# Patient Record
Sex: MALE | Race: WHITE | NOT HISPANIC OR LATINO | Employment: OTHER | URBAN - NONMETROPOLITAN AREA
[De-identification: names, ages, dates, MRNs, and addresses within clinical notes are randomized per-mention and may not be internally consistent; named-entity substitution may affect disease eponyms.]

---

## 2017-01-04 ENCOUNTER — TELEPHONE (OUTPATIENT)
Dept: ORTHOPEDICS | Facility: OTHER | Age: 67
End: 2017-01-04

## 2017-01-04 ENCOUNTER — OFFICE VISIT (OUTPATIENT)
Dept: ORTHOPEDICS | Facility: OTHER | Age: 67
End: 2017-01-04
Attending: PHYSICIAN ASSISTANT
Payer: COMMERCIAL

## 2017-01-04 VITALS
HEART RATE: 59 BPM | OXYGEN SATURATION: 98 % | HEIGHT: 68 IN | BODY MASS INDEX: 28.79 KG/M2 | TEMPERATURE: 96.7 F | SYSTOLIC BLOOD PRESSURE: 124 MMHG | DIASTOLIC BLOOD PRESSURE: 74 MMHG | WEIGHT: 190 LBS

## 2017-01-04 DIAGNOSIS — E78.00 PURE HYPERCHOLESTEROLEMIA: Primary | ICD-10-CM

## 2017-01-04 DIAGNOSIS — D64.9 ANEMIA, UNSPECIFIED TYPE: ICD-10-CM

## 2017-01-04 DIAGNOSIS — M16.11 PRIMARY OSTEOARTHRITIS OF RIGHT HIP: Primary | ICD-10-CM

## 2017-01-04 LAB
ALBUMIN SERPL-MCNC: 4 G/DL (ref 3.4–5)
ALBUMIN UR-MCNC: NEGATIVE MG/DL
ALP SERPL-CCNC: 98 U/L (ref 40–150)
ALT SERPL W P-5'-P-CCNC: 42 U/L (ref 0–70)
ANION GAP SERPL CALCULATED.3IONS-SCNC: 8 MMOL/L (ref 3–14)
APPEARANCE UR: CLEAR
AST SERPL W P-5'-P-CCNC: 54 U/L (ref 0–45)
BASOPHILS # BLD AUTO: 0 10E9/L (ref 0–0.2)
BASOPHILS NFR BLD AUTO: 0.2 %
BILIRUB SERPL-MCNC: 0.8 MG/DL (ref 0.2–1.3)
BILIRUB UR QL STRIP: NEGATIVE
BUN SERPL-MCNC: 19 MG/DL (ref 7–30)
CALCIUM SERPL-MCNC: 9.3 MG/DL (ref 8.5–10.1)
CHLORIDE SERPL-SCNC: 105 MMOL/L (ref 94–109)
CO2 SERPL-SCNC: 28 MMOL/L (ref 20–32)
COLOR UR AUTO: NORMAL
CREAT SERPL-MCNC: 1 MG/DL (ref 0.66–1.25)
DIFFERENTIAL METHOD BLD: NORMAL
EOSINOPHIL # BLD AUTO: 0.2 10E9/L (ref 0–0.7)
EOSINOPHIL NFR BLD AUTO: 5 %
ERYTHROCYTE [DISTWIDTH] IN BLOOD BY AUTOMATED COUNT: 12.9 % (ref 10–15)
GFR SERPL CREATININE-BSD FRML MDRD: 75 ML/MIN/1.7M2
GLUCOSE SERPL-MCNC: 66 MG/DL (ref 70–99)
GLUCOSE UR STRIP-MCNC: NEGATIVE MG/DL
HCT VFR BLD AUTO: 40.5 % (ref 40–53)
HGB BLD-MCNC: 13.7 G/DL (ref 13.3–17.7)
HGB UR QL STRIP: NEGATIVE
IMM GRANULOCYTES # BLD: 0 10E9/L (ref 0–0.4)
IMM GRANULOCYTES NFR BLD: 0.4 %
IRON SATN MFR SERPL: 17 % (ref 15–46)
IRON SERPL-MCNC: 63 UG/DL (ref 35–180)
KETONES UR STRIP-MCNC: NEGATIVE MG/DL
LEUKOCYTE ESTERASE UR QL STRIP: NEGATIVE
LYMPHOCYTES # BLD AUTO: 1.2 10E9/L (ref 0.8–5.3)
LYMPHOCYTES NFR BLD AUTO: 25.6 %
MCH RBC QN AUTO: 31.1 PG (ref 26.5–33)
MCHC RBC AUTO-ENTMCNC: 33.8 G/DL (ref 31.5–36.5)
MCV RBC AUTO: 92 FL (ref 78–100)
MONOCYTES # BLD AUTO: 0.6 10E9/L (ref 0–1.3)
MONOCYTES NFR BLD AUTO: 13.1 %
NEUTROPHILS # BLD AUTO: 2.6 10E9/L (ref 1.6–8.3)
NEUTROPHILS NFR BLD AUTO: 55.7 %
NITRATE UR QL: NEGATIVE
NRBC # BLD AUTO: 0 10*3/UL
NRBC BLD AUTO-RTO: 0 /100
PH UR STRIP: 5 PH (ref 4.7–8)
PLATELET # BLD AUTO: 210 10E9/L (ref 150–450)
POTASSIUM SERPL-SCNC: 4.2 MMOL/L (ref 3.4–5.3)
PROT SERPL-MCNC: 7.9 G/DL (ref 6.8–8.8)
RBC # BLD AUTO: 4.4 10E12/L (ref 4.4–5.9)
SODIUM SERPL-SCNC: 141 MMOL/L (ref 133–144)
SP GR UR STRIP: 1.01 (ref 1–1.03)
TIBC SERPL-MCNC: 376 UG/DL (ref 240–430)
URN SPEC COLLECT METH UR: NORMAL
UROBILINOGEN UR STRIP-MCNC: NORMAL MG/DL (ref 0–2)
WBC # BLD AUTO: 4.6 10E9/L (ref 4–11)

## 2017-01-04 PROCEDURE — 85025 COMPLETE CBC W/AUTO DIFF WBC: CPT | Performed by: ORTHOPAEDIC SURGERY

## 2017-01-04 PROCEDURE — 87086 URINE CULTURE/COLONY COUNT: CPT | Performed by: ORTHOPAEDIC SURGERY

## 2017-01-04 PROCEDURE — 36415 COLL VENOUS BLD VENIPUNCTURE: CPT | Performed by: ORTHOPAEDIC SURGERY

## 2017-01-04 PROCEDURE — 81003 URINALYSIS AUTO W/O SCOPE: CPT | Performed by: ORTHOPAEDIC SURGERY

## 2017-01-04 PROCEDURE — 99214 OFFICE O/P EST MOD 30 MIN: CPT

## 2017-01-04 PROCEDURE — 83540 ASSAY OF IRON: CPT | Performed by: ORTHOPAEDIC SURGERY

## 2017-01-04 PROCEDURE — 80053 COMPREHEN METABOLIC PANEL: CPT | Performed by: ORTHOPAEDIC SURGERY

## 2017-01-04 PROCEDURE — 99213 OFFICE O/P EST LOW 20 MIN: CPT | Performed by: ORTHOPAEDIC SURGERY

## 2017-01-04 PROCEDURE — 83550 IRON BINDING TEST: CPT | Performed by: ORTHOPAEDIC SURGERY

## 2017-01-04 ASSESSMENT — PAIN SCALES - GENERAL: PAINLEVEL: MODERATE PAIN (5)

## 2017-01-04 NOTE — TELEPHONE ENCOUNTER
Patient notified that Dr. Lowery appointment made today to f/u on Anemia can be cancelled patient agreed to plan. Please cancel Beverley appointment.  Melissa Mace LPN

## 2017-01-04 NOTE — TELEPHONE ENCOUNTER
zocor     Last Written Prescription Date: 9/23/16  Last Fill Quantity: 90, # refills: 0  Last Office Visit with G, P or [facility] prescribing provider: 1/4/17   Next 5 appointments (look out 90 days)     Jan 25, 2017  8:40 AM   (Arrive by 8:20 AM)   Return Visit with Mahesh Capps MD    ORTHOPEDICS ([facility])    [address] [facility] [address]   [phone]                   CHOL      191   9/29/2016  HDL       60   9/29/2016  LDL      106   9/29/2016  TRIG      126   9/29/2016  CHOLHDLRATIO      2.6   5/22/2015

## 2017-01-04 NOTE — PROGRESS NOTES
Chief complaint: Severe DJD right hip    Subjective: This 66-year-old retired male was seen on in this office in early 2015 for right hip pain.  X-rays revealed DJD.  The symptoms were successfully controlled with oral sulindac. For whatever reason he came off the sulindac.  According to epic he was not on sulindac in February 2016. Several months ago he developed right groin pain..  Evaluations by a urologist and by a general surgeon failed to find a urologic or general surgical explanation for his right groin pain. He returned to this office and saw Chandra BYNUM on 12/7/16. X-rays showed severe DJD of the right hip.  He was put back on an oral prescription NSAID: this time naproxen.  He is being seen here today in follow-up.    He reports that the Naprosyn has noticeably lessened his right hip pain, but not enough to satisfy him.  His pain still interferes with activities of daily living such as the distance he can walk in the number stairs he can climb.  It also bothers him when he curls.  At his last visit other treatment options such as a steroid injection or DIMAS were discussed.  Today he reports he desires DIMAS.  He has researched the operation and has decided he would like an anterior approach.    Nothing else has changed with respect to his musculoskeletal or neurologic review of systems since seen last. He is not diabetic. He is status post right shoulder arthroplasty.    Examination: Healthy-appearing male with appropriate mood and affect.  Stance and gait were normal.  His leg lengths were normal.  Passive range of motion of the right hip revealed only 90  of flexion with no internal rotation in flexion, and only minimal external rotation in flexion.  He could do a straight leg raise against gravity.  Resisted hip flexion and abduction strength were normal.  The Stinchfield test was positive.  There was no right hip flexion contracture. The neurovascular status of the right leg is intact.    X-rays: I  reviewed his right hip films of 12/7/16 which show severe DJD in the form of complete loss of joint space, large osteophytes, and flattening of the femoral head.    Labs: On 11/16/15 a CBC showed a low H&H of 11.4 and 34.7.  The CBC before that, on 5/22/15, was also low at 12.0 and 36.4.  The CBC was repeated today.  The H&H is 13.7 and 40.5, both within normal limits.  A urinalysis was negative. A CMP showed normal electrolytes, BUN 19, creatinine 1.0,albumin 4.0, total protein 7.9, AST 52, and AST 54. His iron level was 63, iron-binding capacity 376, and iron saturation 17.    Impression:    #1. Severe DJD right hip, failure of oral NSAID  #2.  Prior anemia, now resolved      Plan    #1.  He will attend the next joint class on January 11, 2017.  #2.  I gave him a brochure on total hip arthroplasty and discussed the procedure with him.  I explained that there are 2 anterior approaches: The direct anterior and the anterolateral approach.  I explained the pros and cons of each and how anterior approaches differ from posterior approaches.  We do not do the direct anterior approach here but we do do the anterolateral approach.  He is not certain whether he wants to go anterolateral.  He wants to think about it and reserves the option of going to a different surgeon in [place] for a direct anterior approach.  We set up an appointment to come back and see me after he sees his PCP and attends the joint class, to discuss surgery further, but he may cancel the appointment if he decides to go to [place].

## 2017-01-04 NOTE — MR AVS SNAPSHOT
After Visit Summary   1/4/2017    Oswald Goel    MRN: [id number]           Patient Information     Date Of Birth          1950        Visit Information        Provider Department      1/4/2017 8:20 AM Mahesh Capps MD  ORTHOPEDICS        Today's Diagnoses     Primary osteoarthritis of right hip    -  1     Anemia            Follow-ups after your visit        Your next 10 appointments already scheduled     Jasvir 10, 2017  8:45 AM   (Arrive by 8:30 AM)   Office Visit with ALDEN Lowery MD   [facility])    [address] [facility] [id number] [phone]           Bring a current list of meds and any records pertaining to this visit.  For Physicals, please bring immunization records and any forms needing to be filled out.    Please arrive 15 minutes early to complete paperwork and register.            Jan 25, 2017  8:40 AM   (Arrive by 8:20 AM)   Return Visit with Mahesh Capps MD    ORTHOPEDICS ([facility])    [address] [facility] [id number] [phone]              Who to contact     If you have questions or need follow up information about today's clinic visit or your schedule please contact  ORTHOPEDICS directly at [phone].  Normal or non-critical lab and imaging results will be communicated to you by MyChart, letter or phone within 4 business days after the clinic has received the results. If you do not hear from us within 7 days, please contact the clinic through [employer] or phone. If you have a critical or abnormal lab result, we will notify you by phone as soon as possible.  Submit refill requests through [employer] or call your pharmacy and they will forward the refill request to us. Please allow 3 business days for your refill to be completed.          Additional Information About Your Visit        [employer] Information     [employer] lets you send messages to your doctor, view your test results, renew your  "prescriptions, schedule appointments and more. To sign up, go to [email or web address] . Click on \"Log in\" on the left side of the screen, which will take you to the Welcome page. Then click on \"Sign up Now\" on the right side of the page.     You will be asked to enter the access code listed below, as well as some personal information. Please follow the directions to create your username and password.     Your access code is: [email or web address]  Expires: 2017 12:42 PM     Your access code will  in 90 days. If you need help or a new code, please call your [facility] or [phone].        Care EveryWhere ID     This is your Care EveryWhere ID. This could be used by other organizations to access your [place] medical records  [id number]        Your Vitals Were     Pulse Temperature Height BMI (Body Mass Index) Pulse Oximetry       59 96.7  F (35.9  C) (Tympanic) 5' 8\" (1.727 m) 28.90 kg/m2 98%        Blood Pressure from Last 3 Encounters:   17 124/74   16 138/80   16 164/88    Weight from Last 3 Encounters:   17 190 lb (86.183 kg)   16 190 lb (86.183 kg)   16 198 lb 8 oz (90.039 kg)              We Performed the Following     *UA reflex to Microscopic and Culture     CBC with platelets and differential     Comprehensive metabolic panel     Iron and iron binding capacity     Urine Culture Aerobic Bacterial        Primary Care Provider Office Phone # Fax #    R Raul Lowery [phone]       [facility] [place] [address] [place] [address]        Thank you!     Thank you for choosing  ORTHOPEDICS  for your care. Our goal is always to provide you with excellent care. Hearing back from our patients is one way we can continue to improve our services. Please take a few minutes to complete the written survey that you may receive in the mail after your visit with us. Thank you!             Your Updated Medication List - Protect others around " you: Learn how to safely use, store and throw away your medicines at www.disposemymeds.org.          This list is accurate as of: 1/4/17  8:37 AM.  Always use your most recent med list.                   Brand Name Dispense Instructions for use    ASPIRIN      Aspirin, 81 mg aspirin daily       DAILY MULTIVITAMIN PO      Take 1 tablet by Oral route every day with food       fish oil-omega-3 fatty acids 1000 MG capsule      Take 1 g by mouth daily       GARLIC PO      Take one tablet daily       lisinopril 5 MG tablet    PRINIVIL/ZESTRIL    90 tablet    TAKE (1) TABLET DAILY.       naproxen 500 MG tablet    NAPROSYN    60 tablet    Take 1 tablet (500 mg) by mouth 2 times daily (with meals)       simvastatin 20 MG tablet    ZOCOR    90 tablet    TAKE 1 TABLET DAILY.       TYLENOL PO      Take 325 mg by mouth every 4 hours as needed for mild pain or fever       VITAMIN C PO      Daily

## 2017-01-04 NOTE — NURSING NOTE
"Chief Complaint   Patient presents with     Musculoskeletal Problem     Seen Kt Lopez to [facility]. Here for right hip problem.       Initial /74 mmHg  Pulse 59  Temp(Src) 96.7  F (35.9  C) (Tympanic)  Ht 5' 8\" (1.727 m)  Wt 190 lb (86.183 kg)  BMI 28.90 kg/m2  SpO2 98% Estimated body mass index is 28.9 kg/(m^2) as calculated from the following:    Height as of this encounter: 5' 8\" (1.727 m).    Weight as of this encounter: 190 lb (86.183 kg).  BP completed using cuff size: regular  Jacqui Walton LPN      "

## 2017-01-05 ENCOUNTER — TRANSFERRED RECORDS (OUTPATIENT)
Dept: HEALTH INFORMATION MANAGEMENT | Facility: HOSPITAL | Age: 67
End: 2017-01-05

## 2017-01-05 RX ORDER — SIMVASTATIN 20 MG
TABLET ORAL
Qty: 90 TABLET | Refills: 2 | Status: SHIPPED | OUTPATIENT
Start: 2017-01-05 | End: 2017-07-17

## 2017-01-06 LAB
BACTERIA SPEC CULT: NO GROWTH
MICRO REPORT STATUS: NORMAL
SPECIMEN SOURCE: NORMAL

## 2017-02-21 ENCOUNTER — TELEPHONE (OUTPATIENT)
Dept: FAMILY MEDICINE | Facility: OTHER | Age: 67
End: 2017-02-21

## 2017-02-21 ENCOUNTER — OFFICE VISIT (OUTPATIENT)
Dept: FAMILY MEDICINE | Facility: OTHER | Age: 67
End: 2017-02-21
Attending: FAMILY MEDICINE
Payer: COMMERCIAL

## 2017-02-21 VITALS
SYSTOLIC BLOOD PRESSURE: 136 MMHG | TEMPERATURE: 96.2 F | WEIGHT: 198 LBS | OXYGEN SATURATION: 94 % | DIASTOLIC BLOOD PRESSURE: 74 MMHG | BODY MASS INDEX: 30.01 KG/M2 | HEIGHT: 68 IN | HEART RATE: 67 BPM

## 2017-02-21 DIAGNOSIS — Z01.818 PREOP GENERAL PHYSICAL EXAM: Primary | ICD-10-CM

## 2017-02-21 LAB
ALBUMIN UR-MCNC: NEGATIVE MG/DL
ANION GAP SERPL CALCULATED.3IONS-SCNC: 8 MMOL/L (ref 3–14)
APPEARANCE UR: CLEAR
BASOPHILS # BLD AUTO: 0 10E9/L (ref 0–0.2)
BASOPHILS NFR BLD AUTO: 0.2 %
BILIRUB UR QL STRIP: NEGATIVE
BUN SERPL-MCNC: 23 MG/DL (ref 7–30)
CALCIUM SERPL-MCNC: 8.8 MG/DL (ref 8.5–10.1)
CHLORIDE SERPL-SCNC: 105 MMOL/L (ref 94–109)
CO2 SERPL-SCNC: 28 MMOL/L (ref 20–32)
COLOR UR AUTO: NORMAL
CREAT SERPL-MCNC: 1.15 MG/DL (ref 0.66–1.25)
DIFFERENTIAL METHOD BLD: ABNORMAL
EOSINOPHIL # BLD AUTO: 0.2 10E9/L (ref 0–0.7)
EOSINOPHIL NFR BLD AUTO: 2.5 %
ERYTHROCYTE [DISTWIDTH] IN BLOOD BY AUTOMATED COUNT: 13.4 % (ref 10–15)
GFR SERPL CREATININE-BSD FRML MDRD: 63 ML/MIN/1.7M2
GLUCOSE SERPL-MCNC: 90 MG/DL (ref 70–99)
GLUCOSE UR STRIP-MCNC: NEGATIVE MG/DL
HCT VFR BLD AUTO: 35.8 % (ref 40–53)
HGB BLD-MCNC: 12.4 G/DL (ref 13.3–17.7)
HGB UR QL STRIP: NEGATIVE
IMM GRANULOCYTES # BLD: 0 10E9/L (ref 0–0.4)
IMM GRANULOCYTES NFR BLD: 0.3 %
INR PPP: 1 (ref 0.8–1.2)
KETONES UR STRIP-MCNC: NEGATIVE MG/DL
LEUKOCYTE ESTERASE UR QL STRIP: NEGATIVE
LYMPHOCYTES # BLD AUTO: 1.2 10E9/L (ref 0.8–5.3)
LYMPHOCYTES NFR BLD AUTO: 20 %
MCH RBC QN AUTO: 31.5 PG (ref 26.5–33)
MCHC RBC AUTO-ENTMCNC: 34.6 G/DL (ref 31.5–36.5)
MCV RBC AUTO: 91 FL (ref 78–100)
MONOCYTES # BLD AUTO: 0.5 10E9/L (ref 0–1.3)
MONOCYTES NFR BLD AUTO: 9.1 %
NEUTROPHILS # BLD AUTO: 4.1 10E9/L (ref 1.6–8.3)
NEUTROPHILS NFR BLD AUTO: 67.9 %
NITRATE UR QL: NEGATIVE
NRBC # BLD AUTO: 0 10*3/UL
NRBC BLD AUTO-RTO: 0 /100
PH UR STRIP: 5 PH (ref 4.7–8)
PLATELET # BLD AUTO: 209 10E9/L (ref 150–450)
POTASSIUM SERPL-SCNC: 3.9 MMOL/L (ref 3.4–5.3)
RBC # BLD AUTO: 3.94 10E12/L (ref 4.4–5.9)
SODIUM SERPL-SCNC: 141 MMOL/L (ref 133–144)
SP GR UR STRIP: 1.01 (ref 1–1.03)
URN SPEC COLLECT METH UR: NORMAL
UROBILINOGEN UR STRIP-MCNC: NORMAL MG/DL (ref 0–2)
WBC # BLD AUTO: 6 10E9/L (ref 4–11)

## 2017-02-21 PROCEDURE — 85610 PROTHROMBIN TIME: CPT | Performed by: FAMILY MEDICINE

## 2017-02-21 PROCEDURE — 81003 URINALYSIS AUTO W/O SCOPE: CPT | Performed by: FAMILY MEDICINE

## 2017-02-21 PROCEDURE — 85025 COMPLETE CBC W/AUTO DIFF WBC: CPT | Performed by: FAMILY MEDICINE

## 2017-02-21 PROCEDURE — 99213 OFFICE O/P EST LOW 20 MIN: CPT

## 2017-02-21 PROCEDURE — 93005 ELECTROCARDIOGRAM TRACING: CPT

## 2017-02-21 PROCEDURE — 93010 ELECTROCARDIOGRAM REPORT: CPT | Performed by: INTERNAL MEDICINE

## 2017-02-21 PROCEDURE — 36415 COLL VENOUS BLD VENIPUNCTURE: CPT | Performed by: FAMILY MEDICINE

## 2017-02-21 PROCEDURE — 80048 BASIC METABOLIC PNL TOTAL CA: CPT | Performed by: FAMILY MEDICINE

## 2017-02-21 PROCEDURE — 99214 OFFICE O/P EST MOD 30 MIN: CPT | Performed by: FAMILY MEDICINE

## 2017-02-21 ASSESSMENT — ANXIETY QUESTIONNAIRES
1. FEELING NERVOUS, ANXIOUS, OR ON EDGE: SEVERAL DAYS
GAD7 TOTAL SCORE: 5
3. WORRYING TOO MUCH ABOUT DIFFERENT THINGS: SEVERAL DAYS
5. BEING SO RESTLESS THAT IT IS HARD TO SIT STILL: NOT AT ALL
7. FEELING AFRAID AS IF SOMETHING AWFUL MIGHT HAPPEN: SEVERAL DAYS
6. BECOMING EASILY ANNOYED OR IRRITABLE: SEVERAL DAYS
2. NOT BEING ABLE TO STOP OR CONTROL WORRYING: NOT AT ALL
IF YOU CHECKED OFF ANY PROBLEMS ON THIS QUESTIONNAIRE, HOW DIFFICULT HAVE THESE PROBLEMS MADE IT FOR YOU TO DO YOUR WORK, TAKE CARE OF THINGS AT HOME, OR GET ALONG WITH OTHER PEOPLE: SOMEWHAT DIFFICULT

## 2017-02-21 ASSESSMENT — PATIENT HEALTH QUESTIONNAIRE - PHQ9: 5. POOR APPETITE OR OVEREATING: SEVERAL DAYS

## 2017-02-21 ASSESSMENT — PAIN SCALES - GENERAL: PAINLEVEL: NO PAIN (0)

## 2017-02-21 NOTE — MR AVS SNAPSHOT
After Visit Summary   2/21/2017    Oswald Goel    MRN: [id number]           Patient Information     Date Of Birth          1950        Visit Information        Provider Department      2/21/2017 2:15 PM ALDEN Lowery MD [facility]        Today's Diagnoses     Preop general physical exam    -  1      Care Instructions      Before Your Surgery      Call your surgeon if there is any change in your health. This includes signs of a cold or flu (such as a sore throat, runny nose, cough, rash or fever).    Do not smoke, drink alcohol or take over the counter medicine (unless your surgeon or primary care doctor tells you to) for the 24 hours before and after surgery.    If you take prescribed drugs: Follow your doctor s orders about which medicines to take and which to stop until after surgery.    Eating and drinking prior to surgery: follow the instructions from your surgeon    Take a shower or bath the night before surgery. Use the soap your surgeon gave you to gently clean your skin. If you do not have soap from your surgeon, use your regular soap. Do not shave or scrub the surgery site.  Wear clean pajamas and have clean sheets on your bed.    Hold the ibuprofen and aspirin for one week before. Can use Acetaminophen    Nothing to eat or drink after midnight.Take the Lisinopril with a sip of water in the AM        Follow-ups after your visit        Who to contact     If you have questions or need follow up information about today's clinic visit or your schedule please contact [facility] directly at [phone].  Normal or non-critical lab and imaging results will be communicated to you by MyChart, letter or phone within 4 business days after the clinic has received the results. If you do not hear from us within 7 days, please contact the clinic through MyChart or phone. If you have a critical or abnormal lab result, we will notify you by phone as soon as  "possible.  Submit refill requests through [employer] or call your pharmacy and they will forward the refill request to us. Please allow 3 business days for your refill to be completed.          Additional Information About Your Visit        [employer] Information     3dim lets you send messages to your doctor, view your test results, renew your prescriptions, schedule appointments and more. To sign up, go to [email or web address] . Click on \"Log in\" on the left side of the screen, which will take you to the Welcome page. Then click on \"Sign up Now\" on the right side of the page.     You will be asked to enter the access code listed below, as well as some personal information. Please follow the directions to create your username and password.     Your access code is: [email or web address]  Expires: 2017  2:44 PM     Your access code will  in 90 days. If you need help or a new code, please call your [place] clinic or [phone].        Care EveryWhere ID     This is your Care EveryWhere ID. This could be used by other organizations to access your [place] medical records  [id number]        Your Vitals Were     Pulse Temperature Height Pulse Oximetry BMI (Body Mass Index)       67 96.2  F (35.7  C) (Tympanic) 5' 8\" (1.727 m) 94% 30.11 kg/m2        Blood Pressure from Last 3 Encounters:   17 136/74   17 124/74   16 138/80    Weight from Last 3 Encounters:   17 198 lb (89.8 kg)   17 190 lb (86.2 kg)   16 190 lb (86.2 kg)              We Performed the Following     Basic metabolic panel     CBC with platelets differential     EKG 12-lead complete w/read - Clinics     INR     UA reflex to Microscopic and Culture          Today's Medication Changes          These changes are accurate as of: 17  2:44 PM.  If you have any questions, ask your nurse or doctor.               Stop taking these medicines if you haven't already. Please contact your care team if you have questions.     " naproxen 500 MG tablet   Commonly known as:  NAPROSYN   Stopped by:  ALDEN Lowery MD           TYLENOL PO   Stopped by:  ALDEN Lowery MD                    Primary Care Provider Office Phone # Fax #    ALDEN Lowery [phone]       [facility] [place] [address] [place] [address]        Thank you!     Thank you for choosing [facility]  for your care. Our goal is always to provide you with excellent care. Hearing back from our patients is one way we can continue to improve our services. Please take a few minutes to complete the written survey that you may receive in the mail after your visit with us. Thank you!             Your Updated Medication List - Protect others around you: Learn how to safely use, store and throw away your medicines at www.disposemymeds.org.          This list is accurate as of: 2/21/17  2:44 PM.  Always use your most recent med list.                   Brand Name Dispense Instructions for use    ASPIRIN      Aspirin, 81 mg aspirin daily       DAILY MULTIVITAMIN PO      Take 1 tablet by Oral route every day with food       fish oil-omega-3 fatty acids 1000 MG capsule      Take 1 g by mouth daily       GARLIC PO      Take one tablet daily       IBUPROFEN PO      Take 200 mg by mouth every 8 hours as needed for moderate pain       lisinopril 5 MG tablet    PRINIVIL/ZESTRIL    90 tablet    TAKE (1) TABLET DAILY.       simvastatin 20 MG tablet    ZOCOR    90 tablet    TAKE 1 TABLET DAILY.       VITAMIN C PO      Daily

## 2017-02-21 NOTE — NURSING NOTE
"Chief Complaint   Patient presents with     Pre-Op Exam     *_* Health Care Directive *_*     declined        Initial /74 (BP Location: Left arm, Patient Position: Chair, Cuff Size: Adult Regular)  Pulse 67  Temp 96.2  F (35.7  C) (Tympanic)  Ht 5' 8\" (1.727 m)  Wt 198 lb (89.8 kg)  SpO2 94%  BMI 30.11 kg/m2 Estimated body mass index is 30.11 kg/(m^2) as calculated from the following:    Height as of this encounter: 5' 8\" (1.727 m).    Weight as of this encounter: 198 lb (89.8 kg).  Medication Reconciliation: complete     LEILA DE OLIVEIRA      "

## 2017-02-21 NOTE — PROGRESS NOTES
[facility]  [address]  [place] [address]  [phone]  Dept: [phone]    PRE-OP EVALUATION:  Today's date: 2017    Oswald Goel (: 1950) presents for pre-operative evaluation assessment as requested by Dr. Hurtado.  He requires evaluation and anesthesia risk assessment prior to undergoing surgery/procedure for treatment of right hip replacement  .  Proposed procedure: right hip replacement surgery    Date of Surgery/ Procedure: 03/10/2017  Time of Surgery/ Procedure: Unknown  Hospital/Surgical Facility: [facility]  Primary Physician: ALDEN Lowery  Type of Anesthesia Anticipated: to be determined    Patient has a Health Care Directive or Living Will:  NO    1. NO - Do you have a history of heart attack, stroke, stent, bypass or surgery on an artery in the head, neck, heart or legs?  2. NO - Do you ever have any pain or discomfort in your chest?  3. NO - Do you have a history of  Heart Failure?  4. NO - Are you troubled by shortness of breath when: walking on the level, up a slight hill or at night?  5. NO - Do you currently have a cold, bronchitis or other respiratory infection?  6. NO - Do you have a cough, shortness of breath or wheezing?  7. NO - Do you sometimes get pains in the calves of your legs when you walk?  8. NO - Do you or anyone in your family have previous history of blood clots?  9. NO - Do you or does anyone in your family have a serious bleeding problem such as prolonged bleeding following surgeries or cuts?  10. NO - Have you ever had problems with anemia or been told to take iron pills?  11. NO - Have you had any abnormal blood loss such as black, tarry or bloody stools, or abnormal vaginal bleeding?  12. NO - Have you ever had a blood transfusion?  13. NO - Have you or any of your relatives ever had problems with anesthesia?  14. NO - Do you have sleep apnea, excessive snoring or daytime drowsiness?  15. NO - Do you have any prosthetic heart  valves?  16. YES Do you have prosthetic joints? Right shoulder   17. NO - Is there any chance that you may be pregnant?      HPI:                                                      Brief HPI related to upcoming procedure: right hip replacement      See problem list for active medical problems.  Problems all longstanding and stable, except as noted/documented.  See ROS for pertinent symptoms related to these conditions.                                                                                                  .    MEDICAL HISTORY:                                                      Patient Active Problem List    Diagnosis Date Noted     ACP (advance care planning) 08/10/2016     Priority: Medium     Advance Care Planning 8/10/2016: ACP Review of Chart / Resources Provided:  Reviewed chart for advance care plan.  Oswald CECI Goel has no plan or code status on file. Discussed available resources and provided with information. Confirmed code status reflects current choices pending further ACP discussions.  Confirmed/documented legally designated decision makers.  Added by YOANDY NASH             Primary localized osteoarthrosis, pelvic region and thigh 01/20/2015     Priority: Medium     Advanced care planning/counseling discussion 03/30/2012     Priority: Medium     Hypertension, Benign 02/22/2011     Priority: Medium     RBBB 02/22/2011     Priority: Medium     Nonallopathic lesion of cervical region 08/27/2008     Priority: Medium     Problem list name updated by automated process. Provider to review       Hypertrophy of prostate without urinary obstruction 10/10/2007     Priority: Medium     Problem list name updated by automated process. Provider to review       Pure hypercholesterolemia 12/29/1999     Priority: Medium      Past Medical History   Diagnosis Date     Depressive disorder, not elsewhere classified 02/22/2011     Hypertension, Benign 02/22/2011     Hypertrophy (benign) of prostate without  urinary o 10/10/2007     Nonallopathic lesion of cervical region, not elsewhere classified 08/27/2008     Pure hypercholesterolemia 12/29/1999     RBBB 02/22/2011     Past Surgical History   Procedure Laterality Date     Colonoscopy  03-     repeat 10 yrs     Hernia repair       Colonoscopy  2005     Release carpal tunnel       RT     Tonsillectomy       Left arthroscopy       Transposition ulnar nerve (elbow) Left 11/19/2015     Procedure: TRANSPOSITION ULNAR NERVE (ELBOW);  Surgeon: Mahesh Capps MD;  Location: [facility]     Current Outpatient Prescriptions   Medication Sig Dispense Refill     IBUPROFEN PO Take 200 mg by mouth every 8 hours as needed for moderate pain       simvastatin (ZOCOR) 20 MG tablet TAKE 1 TABLET DAILY. 90 tablet 2     lisinopril (PRINIVIL/ZESTRIL) 5 MG tablet TAKE (1) TABLET DAILY. 90 tablet 0     fish oil-omega-3 fatty acids (FISH OIL) 1000 MG capsule Take 1 g by mouth daily       ASPIRIN Aspirin, 81 mg aspirin daily       GARLIC PO Take one tablet daily       Multiple Vitamin (DAILY MULTIVITAMIN PO) Take 1 tablet by Oral route every day with food       Ascorbic Acid (VITAMIN C PO) Daily       OTC products: Aspirin and NSAIDS    No Known Allergies   Latex Allergy: NO    Social History   Substance Use Topics     Smoking status: Former Smoker     Types: Cigarettes     Smokeless tobacco: Former User      Comment: no passive exposure, tried to quit-yes     Alcohol use Yes      Comment: 2 glasses, daily, yesterday     History   Drug Use No       REVIEW OF SYSTEMS:                                                    C: NEGATIVE for fever, chills, change in weight  I: NEGATIVE for worrisome rashes, moles or lesions  E: NEGATIVE for vision changes or irritation  E/M: NEGATIVE for ear, mouth and throat problems  R: NEGATIVE for significant cough or SOB  B: NEGATIVE for masses, tenderness or discharge  CV: NEGATIVE for chest pain, palpitations or peripheral edema  GI: NEGATIVE for nausea,  "abdominal pain, heartburn, or change in bowel habits  : NEGATIVE for frequency, dysuria, or hematuria  M: NEGATIVE for significant arthralgias or myalgia except the right hip pain.  N: NEGATIVE for weakness, dizziness or paresthesias  E: NEGATIVE for temperature intolerance, skin/hair changes  H: NEGATIVE for bleeding problems  P: NEGATIVE for changes in mood or affect    EXAM:                                                    /74 (BP Location: Left arm, Patient Position: Chair, Cuff Size: Adult Regular)  Pulse 67  Temp 96.2  F (35.7  C) (Tympanic)  Ht 5' 8\" (1.727 m)  Wt 198 lb (89.8 kg)  SpO2 94%  BMI 30.11 kg/m2    GENERAL APPEARANCE: healthy, alert and no distress     EYES: EOMI, - PERRL     HENT: ear canals and TM's normal and nose and mouth without ulcers or lesions     NECK: no adenopathy, no asymmetry, masses, or scars and thyroid normal to palpation     RESP: lungs clear to auscultation - no rales, rhonchi or wheezes     CV: regular rates and rhythm, normal S1 S2, no S3 or S4 and no murmur, click or rub -     ABDOMEN:  soft, nontender, no HSM or masses and bowel sounds normal     Rectal: not examined     MS: right hip discomfort     SKIN: no suspicious lesions or rashes     NEURO: Normal strength and tone, sensory exam grossly normal, mentation intact and speech normal     PSYCH: mentation appears normal. and affect normal/bright     LYMPHATICS: No axillary, cervical, inguinal, or supraclavicular nodes    DIAGNOSTICS:                                                      EKG: appears normal, NSR, Right Bundle Branch Block, unchanged from previous tracings  Labs Resulted Today:   Results for orders placed or performed in visit on 02/21/17   UA reflex to Microscopic and Culture   Result Value Ref Range    Color Urine Light Yellow     Appearance Urine Clear     Glucose Urine Negative NEG mg/dL    Bilirubin Urine Negative NEG    Ketones Urine Negative NEG mg/dL    Specific Gravity Urine 1.014 1.003 - " 1.035    Blood Urine Negative NEG    pH Urine 5.0 4.7 - 8.0 pH    Protein Albumin Urine Negative NEG mg/dL    Urobilinogen mg/dL Normal 0.0 - 2.0 mg/dL    Nitrite Urine Negative NEG    Leukocyte Esterase Urine Negative NEG    Source Midstream Urine    CBC with platelets differential   Result Value Ref Range    WBC 6.0 4.0 - 11.0 10e9/L    RBC Count 3.94 (L) 4.4 - 5.9 10e12/L    Hemoglobin 12.4 (L) 13.3 - 17.7 g/dL    Hematocrit 35.8 (L) 40.0 - 53.0 %    MCV 91 78 - 100 fl    MCH 31.5 26.5 - 33.0 pg    MCHC 34.6 31.5 - 36.5 g/dL    RDW 13.4 10.0 - 15.0 %    Platelet Count 209 150 - 450 10e9/L    Diff Method Automated Method     % Neutrophils 67.9 %    % Lymphocytes 20.0 %    % Monocytes 9.1 %    % Eosinophils 2.5 %    % Basophils 0.2 %    % Immature Granulocytes 0.3 %    Nucleated RBCs 0 0 /100    Absolute Neutrophil 4.1 1.6 - 8.3 10e9/L    Absolute Lymphocytes 1.2 0.8 - 5.3 10e9/L    Absolute Monocytes 0.5 0.0 - 1.3 10e9/L    Absolute Eosinophils 0.2 0.0 - 0.7 10e9/L    Absolute Basophils 0.0 0.0 - 0.2 10e9/L    Abs Immature Granulocytes 0.0 0 - 0.4 10e9/L    Absolute Nucleated RBC 0.0    Basic metabolic panel   Result Value Ref Range    Sodium 141 133 - 144 mmol/L    Potassium 3.9 3.4 - 5.3 mmol/L    Chloride 105 94 - 109 mmol/L    Carbon Dioxide 28 20 - 32 mmol/L    Anion Gap 8 3 - 14 mmol/L    Glucose 90 70 - 99 mg/dL    Urea Nitrogen 23 7 - 30 mg/dL    Creatinine 1.15 0.66 - 1.25 mg/dL    GFR Estimate 63 >60 mL/min/1.7m2    GFR Estimate If Black 76 >60 mL/min/1.7m2    Calcium 8.8 8.5 - 10.1 mg/dL   INR   Result Value Ref Range    INR 1.00 0.80 - 1.20            IMPRESSION:                                                    Reason for surgery/procedure: right hip replacement  Diagnosis/reason for consult: cardiac clearance    The proposed surgical procedure is considered INTERMEDIATE risk.    REVISED CARDIAC RISK INDEX  The patient has the following serious cardiovascular risks for perioperative complications  such as (MI, PE, VFib and 3  AV Block):  No serious cardiac risks  INTERPRETATION: 0 risks: Class I (very low risk - 0.4% complication rate)    The patient has the following additional risks for perioperative complications:  No identified additional risks        RECOMMENDATIONS:                                                      APPROVAL GIVEN to proceed with proposed procedure, without further diagnostic evaluation. He will hold the aspirin and ibuprofen for one week prior. Will be NPO after midnight but take his lisinopril with a sip of water. He can do 4 METS of activities without problems.       Signed Electronically by: ALDEN Lowery MD    Copy of this evaluation report is provided to requesting physician.    [facility] Preop Guidelines

## 2017-02-21 NOTE — PATIENT INSTRUCTIONS
Before Your Surgery      Call your surgeon if there is any change in your health. This includes signs of a cold or flu (such as a sore throat, runny nose, cough, rash or fever).    Do not smoke, drink alcohol or take over the counter medicine (unless your surgeon or primary care doctor tells you to) for the 24 hours before and after surgery.    If you take prescribed drugs: Follow your doctor s orders about which medicines to take and which to stop until after surgery.    Eating and drinking prior to surgery: follow the instructions from your surgeon    Take a shower or bath the night before surgery. Use the soap your surgeon gave you to gently clean your skin. If you do not have soap from your surgeon, use your regular soap. Do not shave or scrub the surgery site.  Wear clean pajamas and have clean sheets on your bed.    Hold the ibuprofen and aspirin for one week before. Can use Acetaminophen    Nothing to eat or drink after midnight.Take the Lisinopril with a sip of water in the AM

## 2017-02-22 ASSESSMENT — ANXIETY QUESTIONNAIRES: GAD7 TOTAL SCORE: 5

## 2017-02-22 ASSESSMENT — PATIENT HEALTH QUESTIONNAIRE - PHQ9: SUM OF ALL RESPONSES TO PHQ QUESTIONS 1-9: 3

## 2017-03-10 ENCOUNTER — TRANSFERRED RECORDS (OUTPATIENT)
Dept: HEALTH INFORMATION MANAGEMENT | Facility: HOSPITAL | Age: 67
End: 2017-03-10

## 2017-03-29 ENCOUNTER — TRANSFERRED RECORDS (OUTPATIENT)
Dept: HEALTH INFORMATION MANAGEMENT | Facility: HOSPITAL | Age: 67
End: 2017-03-29

## 2017-04-20 ENCOUNTER — TRANSFERRED RECORDS (OUTPATIENT)
Dept: HEALTH INFORMATION MANAGEMENT | Facility: HOSPITAL | Age: 67
End: 2017-04-20

## 2017-05-18 ENCOUNTER — TRANSFERRED RECORDS (OUTPATIENT)
Dept: HEALTH INFORMATION MANAGEMENT | Facility: HOSPITAL | Age: 67
End: 2017-05-18

## 2017-06-30 DIAGNOSIS — I10 ESSENTIAL HYPERTENSION: ICD-10-CM

## 2017-06-30 RX ORDER — LISINOPRIL 5 MG/1
TABLET ORAL
Qty: 90 TABLET | Refills: 1 | Status: SHIPPED | OUTPATIENT
Start: 2017-06-30 | End: 2017-09-11

## 2017-07-17 DIAGNOSIS — E78.00 PURE HYPERCHOLESTEROLEMIA: ICD-10-CM

## 2017-07-18 RX ORDER — SIMVASTATIN 20 MG
TABLET ORAL
Qty: 90 TABLET | Refills: 0 | Status: SHIPPED | OUTPATIENT
Start: 2017-07-18 | End: 2017-10-12

## 2017-07-18 NOTE — TELEPHONE ENCOUNTER
Zocor      Last Written Prescription Date: 1/5/17  Last Fill Quantity: 90, # refills: 0  Last Office Visit with [facility], P or [facility] prescribing provider: 2/21/17       Lab Results   Component Value Date    CHOL 191 09/29/2016     Lab Results   Component Value Date    HDL 60 09/29/2016     Lab Results   Component Value Date     09/29/2016     Lab Results   Component Value Date    TRIG 126 09/29/2016     Lab Results   Component Value Date    CHOLHDLRATIO 2.6 05/22/2015

## 2017-09-11 DIAGNOSIS — I10 ESSENTIAL HYPERTENSION: ICD-10-CM

## 2017-09-12 RX ORDER — LISINOPRIL 5 MG/1
TABLET ORAL
Qty: 90 TABLET | Refills: 1 | Status: SHIPPED | OUTPATIENT
Start: 2017-09-12 | End: 2018-02-26

## 2017-10-12 DIAGNOSIS — E78.00 PURE HYPERCHOLESTEROLEMIA: ICD-10-CM

## 2017-10-12 NOTE — TELEPHONE ENCOUNTER
Simvastatin     Last Written Prescription Date: 7/18/17  Last Fill Quantity: 90, # refills: 0  Last Office Visit with [facility], P or [facility] prescribing provider: 2/21/17       Lab Results   Component Value Date    CHOL 191 09/29/2016     Lab Results   Component Value Date    HDL 60 09/29/2016     Lab Results   Component Value Date     09/29/2016     Lab Results   Component Value Date    TRIG 126 09/29/2016     Lab Results   Component Value Date    CHOLHDLRATIO 2.6 05/22/2015

## 2017-10-13 RX ORDER — SIMVASTATIN 20 MG
TABLET ORAL
Qty: 90 TABLET | Refills: 0 | Status: SHIPPED | OUTPATIENT
Start: 2017-10-13 | End: 2018-04-16

## 2017-10-16 ENCOUNTER — OFFICE VISIT (OUTPATIENT)
Dept: FAMILY MEDICINE | Facility: OTHER | Age: 67
End: 2017-10-16
Attending: FAMILY MEDICINE
Payer: COMMERCIAL

## 2017-10-16 ENCOUNTER — RADIANT APPOINTMENT (OUTPATIENT)
Dept: GENERAL RADIOLOGY | Facility: OTHER | Age: 67
End: 2017-10-16
Attending: FAMILY MEDICINE
Payer: COMMERCIAL

## 2017-10-16 VITALS
OXYGEN SATURATION: 98 % | HEIGHT: 68 IN | DIASTOLIC BLOOD PRESSURE: 78 MMHG | WEIGHT: 204 LBS | HEART RATE: 52 BPM | RESPIRATION RATE: 19 BRPM | TEMPERATURE: 98 F | SYSTOLIC BLOOD PRESSURE: 128 MMHG | BODY MASS INDEX: 30.92 KG/M2

## 2017-10-16 DIAGNOSIS — M62.830 BACK MUSCLE SPASM: ICD-10-CM

## 2017-10-16 DIAGNOSIS — S20.221A BACK CONTUSION, RIGHT, INITIAL ENCOUNTER: ICD-10-CM

## 2017-10-16 DIAGNOSIS — M54.50 ACUTE RIGHT-SIDED LOW BACK PAIN WITHOUT SCIATICA: Primary | ICD-10-CM
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
## 2017-10-16 PROCEDURE — 99213 OFFICE O/P EST LOW 20 MIN: CPT | Performed by: FAMILY MEDICINE

## 2017-10-16 PROCEDURE — 99212 OFFICE O/P EST SF 10 MIN: CPT

## 2017-10-16 PROCEDURE — 72100 X-RAY EXAM L-S SPINE 2/3 VWS: CPT | Mod: TC

## 2017-10-16 RX ORDER — ORPHENADRINE CITRATE 100 MG/1
100 TABLET, EXTENDED RELEASE ORAL 2 TIMES DAILY
Qty: 20 TABLET | Refills: 1 | Status: SHIPPED | OUTPATIENT
Start: 2017-10-16 | End: 2018-01-25

## 2017-10-16 ASSESSMENT — ANXIETY QUESTIONNAIRES
2. NOT BEING ABLE TO STOP OR CONTROL WORRYING: NOT AT ALL
3. WORRYING TOO MUCH ABOUT DIFFERENT THINGS: NOT AT ALL
GAD7 TOTAL SCORE: 2
4. TROUBLE RELAXING: NOT AT ALL
5. BEING SO RESTLESS THAT IT IS HARD TO SIT STILL: NOT AT ALL
IF YOU CHECKED OFF ANY PROBLEMS ON THIS QUESTIONNAIRE, HOW DIFFICULT HAVE THESE PROBLEMS MADE IT FOR YOU TO DO YOUR WORK, TAKE CARE OF THINGS AT HOME, OR GET ALONG WITH OTHER PEOPLE: SOMEWHAT DIFFICULT
7. FEELING AFRAID AS IF SOMETHING AWFUL MIGHT HAPPEN: NOT AT ALL
6. BECOMING EASILY ANNOYED OR IRRITABLE: SEVERAL DAYS
1. FEELING NERVOUS, ANXIOUS, OR ON EDGE: SEVERAL DAYS

## 2017-10-16 ASSESSMENT — PAIN SCALES - GENERAL: PAINLEVEL: MODERATE PAIN (4)

## 2017-10-16 ASSESSMENT — PATIENT HEALTH QUESTIONNAIRE - PHQ9: SUM OF ALL RESPONSES TO PHQ QUESTIONS 1-9: 5

## 2017-10-16 NOTE — MR AVS SNAPSHOT
After Visit Summary   10/16/2017    Oswald Goel    MRN: [id number]           Patient Information     Date Of Birth          1950        Visit Information        Provider Department      10/16/2017 9:40 AM Kevin Brooke MD [facility]        Today's Diagnoses     Acute right-sided low back pain without sciatica    -  1    Fall, initial encounter        Back muscle spasm        Back contusion, right, initial encounter          Care Instructions    For pain control, you will alternate every 4 hours between ibuprofen and acetaminophen. Take 1000mg acetaminophen every 8 hours. Alternate that with 800mg of ibuprofen every 8 hours.    For example:   8am - 1000mg acetaminophen   12pm - 800mg ibuprofen   4pm - 1000mg acetaminophen   8pm - 800mg ibuprofen   And so on.       Back Contusion     You have a contusion to your back. A contusion is also called a bruise. There is swelling and some bleeding under the skin. The skin may be purplish. You may have muscle aching and stiffness in the area of the bruise. There are no broken bones.  Contusions heal on their own, without further treatment. However, pain and skin discoloration may take weeks to months to go away.   Home care    Rest. Avoid heavy lifting, strenuous exertion, or any activity that causes pain.    Ice the area to reduce pain and swelling. Put ice cubes in a plastic bag or use a cold pack. (Wrap the cold source in a thin towel. Do not place it directly on your skin.) Ice the injured area for 20 minutes every 1-2 hours the first day. Continue with ice packs 3-4 times a day for the next two days, then as needed for the relief of pain and swelling.    Take any prescribed pain medication. If none was prescribed, take acetaminophen, ibuprofen, or naproxen to control pain.  Follow-up care  Follow up with your healthcare provider, or as directed. Call if you are not better in 1-2 weeks.  When to seek medical advice  Call your  healthcare provider for any of the following:    New or worsening pain    Increased swelling around the bruise    Pain spreads to one or both legs    Weakness or numbness in one or both legs     Loss of bowel or bladder control    Numbness in the groin or genital area    Fever of 100.4 F (38 C) or higher, or as directed by your healthcare provider  Date Last Reviewed: 6/26/2015 2000-2017 [employer]. [address] [place] [address]. All rights reserved. This information is not intended as a substitute for professional medical care. Always follow your healthcare professional's instructions.                Follow-ups after your visit        Additional Services     PHYSICAL THERAPY REFERRAL       *This order will print in the [facility] Central Scheduling Office*    [facility] provides Physical Therapy evaluation and treatment and many specialty services across the [facility] system.  If requesting a specialty program, please choose from the list below.    Call [phone] to schedule [facility] at all locations, with the exception of [facility], please call [phone].     Treatment: Evaluation & Treatment  Special Instructions/Modalities: Eval and treat   Special Programs: good HEP    Please be aware that coverage of these services is subject to the terms and limitations of your health insurance plan.  Call member services at your health plan with any benefit or coverage questions.      **Note to Provider** To refer patients to therapy outside of the location list, change the order class to External Referral in the order composer.                  Who to contact     If you have questions or need follow up information about today's clinic visit or your schedule please contact [facility] directly at [phone].  Normal or non-critical lab and imaging results will be communicated to you by [facility]  "letter or phone within 4 business days after the clinic has received the results. If you do not hear from us within 7 days, please contact the clinic through [employer] or phone. If you have a critical or abnormal lab result, we will notify you by phone as soon as possible.  Submit refill requests through [employer] or call your pharmacy and they will forward the refill request to us. Please allow 3 business days for your refill to be completed.          Additional Information About Your Visit        [employer] Information     [employer] lets you send messages to your doctor, view your test results, renew your prescriptions, schedule appointments and more. To sign up, go to [email or web address] . Click on \"Log in\" on the left side of the screen, which will take you to the Welcome page. Then click on \"Sign up Now\" on the right side of the page.     You will be asked to enter the access code listed below, as well as some personal information. Please follow the directions to create your username and password.     Your access code is: [email or web address]  Expires: 2018 11:21 AM     Your access code will  in 90 days. If you need help or a new code, please call your [facility] clinic or [phone].        Care EveryWhere ID     This is your Care EveryWhere ID. This could be used by other organizations to access your [facility] medical records  [id number]        Your Vitals Were     Pulse Temperature Respirations Height Pulse Oximetry BMI (Body Mass Index)    52 98  F (36.7  C) (Tympanic) 19 5' 8\" (1.727 m) 98% 31.02 kg/m2       Blood Pressure from Last 3 Encounters:   10/16/17 128/78   17 136/74   17 124/74    Weight from Last 3 Encounters:   10/16/17 204 lb (92.5 kg)   17 198 lb (89.8 kg)   17 190 lb (86.2 kg)              We Performed the Following     PHYSICAL THERAPY REFERRAL     XR Lumbar Spine 2/3 Views          Today's Medication Changes          These changes are accurate as of: 10/16/17 11:21 " AM.  If you have any questions, ask your nurse or doctor.               Start taking these medicines.        Dose/Directions    orphenadrine 100 MG 12 hr tablet   Commonly known as:  NORFLEX   Used for:  Acute right-sided low back pain without sciatica, Back muscle spasm   Started by:  Kevin Brooke MD        Dose:  100 mg   Take 1 tablet (100 mg) by mouth 2 times daily   Quantity:  20 tablet   Refills:  1            Where to get your medicines      These medications were sent to [facility] [address] [address]     Phone:  [phone]     orphenadrine 100 MG 12 hr tablet                Primary Care Provider Office Phone # Fax #    R Raul Lowery [phone]       [address] [address]        Equal Access to Services     RAJINDER [facility] : Hadii omer yepez hadasho Soomaali, waaxda luqadaha, qaybta kaalmada adeegyada, wilfredo palacios hayashley monahan . So [facility] [phone].    ATENCIÓN: Si habla español, tiene a boothe disposición servicios gratuitos de asistencia lingüística. Llame al [phone].    We comply with applicable federal civil rights laws and [place] laws. We do not discriminate on the basis of race, color, national origin, age, disability, sex, sexual orientation, or gender identity.            Thank you!     Thank you for choosing [facility]  for your care. Our goal is always to provide you with excellent care. Hearing back from our patients is one way we can continue to improve our services. Please take a few minutes to complete the written survey that you may receive in the mail after your visit with us. Thank you!             Your Updated Medication List - Protect others around you: Learn how to safely use, store and throw away your medicines at www.disposemymeds.org.          This list is accurate as of: 10/16/17 11:21 AM.  Always use your most recent med  list.                   Brand Name Dispense Instructions for use Diagnosis    ASPIRIN      Aspirin, 81 mg aspirin daily        DAILY MULTIVITAMIN PO      Take 1 tablet by Oral route every day with food        fish oil-omega-3 fatty acids 1000 MG capsule      Take 1 g by mouth daily        GARLIC PO      Take one tablet daily        IBUPROFEN PO      Take 200 mg by mouth every 8 hours as needed for moderate pain        lisinopril 5 MG tablet    PRINIVIL/ZESTRIL    90 tablet    TAKE (1) TABLET DAILY.    Essential hypertension       orphenadrine 100 MG 12 hr tablet    NORFLEX    20 tablet    Take 1 tablet (100 mg) by mouth 2 times daily    Acute right-sided low back pain without sciatica, Back muscle spasm       simvastatin 20 MG tablet    ZOCOR    90 tablet    TAKE 1 TABLET DAILY.    Pure hypercholesterolemia       VITAMIN C PO      Daily

## 2017-10-16 NOTE — PATIENT INSTRUCTIONS
For pain control, you will alternate every 4 hours between ibuprofen and acetaminophen. Take 1000mg acetaminophen every 8 hours. Alternate that with 800mg of ibuprofen every 8 hours.    For example:   8am - 1000mg acetaminophen   12pm - 800mg ibuprofen   4pm - 1000mg acetaminophen   8pm - 800mg ibuprofen   And so on.       Back Contusion     You have a contusion to your back. A contusion is also called a bruise. There is swelling and some bleeding under the skin. The skin may be purplish. You may have muscle aching and stiffness in the area of the bruise. There are no broken bones.  Contusions heal on their own, without further treatment. However, pain and skin discoloration may take weeks to months to go away.   Home care    Rest. Avoid heavy lifting, strenuous exertion, or any activity that causes pain.    Ice the area to reduce pain and swelling. Put ice cubes in a plastic bag or use a cold pack. (Wrap the cold source in a thin towel. Do not place it directly on your skin.) Ice the injured area for 20 minutes every 1-2 hours the first day. Continue with ice packs 3-4 times a day for the next two days, then as needed for the relief of pain and swelling.    Take any prescribed pain medication. If none was prescribed, take acetaminophen, ibuprofen, or naproxen to control pain.  Follow-up care  Follow up with your healthcare provider, or as directed. Call if you are not better in 1-2 weeks.  When to seek medical advice  Call your healthcare provider for any of the following:    New or worsening pain    Increased swelling around the bruise    Pain spreads to one or both legs    Weakness or numbness in one or both legs     Loss of bowel or bladder control    Numbness in the groin or genital area    Fever of 100.4 F (38 C) or higher, or as directed by your healthcare provider  Date Last Reviewed: 6/26/2015 2000-2017 [employer]. [address] [place] [address], [place] [address]. All rights reserved. This  information is not intended as a substitute for professional medical care. Always follow your healthcare professional's instructions.

## 2017-10-16 NOTE — PROGRESS NOTES
SUBJECTIVE:   Oswald Goel is a 66 year old male who presents to clinic today for the following health issues:      Back Pain       Duration: 4 days        Specific cause: fall     Description:   Location of pain: low back both  Character of pain: dull ache and intermittent  Pain radiation:none  New numbness or weakness in legs, not attributed to pain:  no     Intensity: Currently 4/10    History:   Pain interferes with job: No  History of back problems: no prior back problems  Any previous MRI or X-rays: None  Sees a specialist for back pain:  No  Therapies tried without relief: cold and NSAIDs    Alleviating factors:   Improved by: none        Precipitating factors:  Worsened by: Lifting and Bending    Functional and Psychosocial Screen ([employer] STarT Back):      Not performed today      Last Thursday fell stepping into a boat. Hit his lower back on rim of the boat. No head impact. Sharp pain on both sides initially but has progressed to right side. Feels stabbing, constant. Not getting any better but not worse. Has been active - gym, football game, etc. No numbness, weakness or tingling down leg.   Stiff in the morning. Hard to get out of bed, getting off the toilet or chair, pain with twisting. Having frequent spasms.  Laying in bed relieves the pain. Better with standing than sitting. Has tried heat, cold, tylenol 500mg q4hr and ibuprofen 800 q4hr- not really helping.   No hx of back injury. Has gone to the chiropractor a few times lower back pain.     Problem list and histories reviewed & adjusted, as indicated.  Additional history: as documented    Labs reviewed in EPIC    Reviewed and updated as needed this visit by clinical staff  Tobacco  Allergies  Meds  Med Hx  Surg Hx  Fam Hx  Soc Hx      Reviewed and updated as needed this visit by Provider       ROS:  C: NEGATIVE for fever, chills  R: NEGATIVE for significant cough or SOB  MS: NEGATIVE for myalgias, weakness, numbness or tingling.  "    OBJECTIVE:                                                    /78 (BP Location: Right arm, Patient Position: Chair, Cuff Size: Adult Large)  Pulse 52  Temp 98  F (36.7  C) (Tympanic)  Resp 19  Ht 5' 8\" (1.727 m)  Wt 204 lb (92.5 kg)  SpO2 98%  BMI 31.02 kg/m2  Body mass index is 31.02 kg/(m^2).     GENERAL: healthy, alert, well nourished, well hydrated, no distress  MS: extremities- no gross deformities noted, no edema  NEURO: strength and tone- normal, sensory exam- grossly normal, mentation- intact, speech- normal, reflexes- symmetric.   BACK: No swelling. Bruising and scratches along right lower back in [id number] distribution. Paraspinal tenderness on right side 2-4 inches above SI joint. No pain with palpation of left paralumbar muscles. No spinal tenderness.         Results for orders placed or performed in visit on 10/16/17 (from the past 24 hour(s))   XR Lumbar Spine 2/3 Views    Narrative    Procedure: XR LUMBAR SPINE 2-3 VIEWS    HISTORY:  Acute right-sided low back pain without sciatica.    TECHNIQUE: Lumbar spine 3 views.    COMPARISON: MRI lumbar spine 12/17/2010    FINDINGS:    There is slight levoscoliosis. Alignment is otherwise maintained.   Vertebral body heights are maintained.  Disc spaces are narrowed at  all levels with associated degenerative endplate changes and  osteophytes. There is also multilevel facet arthrosis.      Impression    IMPRESSION: Multilevel degenerative disease.      KAMERON SHORT MD       ASSESSMENT/PLAN:                                                    (M54.5) Acute right-sided low back pain without sciatica  (primary encounter diagnosis)  Comment: Paralumbar tenderness on the rt side without spinal tenderness. Does have bruising, erythema, and scratch marks where the pain is localized. Difficulty walking and initiating standing with frequent back spasms. XR was negative for acute fracture. Instructed him to treat with rest, light exercise, and heat, in " addition to physical therapy. Pain and spasm tx with Norflex and alternating between acetaminophen 1000mg and ibuprofen 800mg q4hrs.   Plan: XR Lumbar Spine 2/3 Views, orphenadrine         (NORFLEX) 100 MG 12 hr tablet, PHYSICAL THERAPY        REFERRAL            (W19.XXXA) Fall, initial encounter  Comment: Caused back contusion, lower back pain and spasms. See above.   Plan: XR Lumbar Spine 2/3 Views, PHYSICAL THERAPY         REFERRAL            (M62.830) Back muscle spasm  Comment: Has difficulty initiating movement and rising from seated position due to spasms. Will tx with Norflex and physical therapy. See above.   Plan: orphenadrine (NORFLEX) 100 MG 12 hr tablet,         PHYSICAL THERAPY REFERRAL            (S20.221A) Back contusion, right, initial encounter  Comment: Has back contusion on the right paralumbar muscle with bruising, erythema and scratching. See above.  Plan: PHYSICAL THERAPY REFERRAL Discussed in length conservative measures of OTC medications for pain, Ice/Heat, elevation and the concept of R.I.C.E.. Continue behavioral changes and proper body mechanics to allow for healing. Follow up as directed.   Discussed behavioral changes and proper body mechanics needed to help control patient's back pain.            Plan was discussed with the patient and they were in agreement. Patient was informed when they should call and/or come back into the clinic or go to ER/Urgent care. All questions answered.     See Patient Instructions    Kevin Brooke MD  [facility]

## 2017-10-16 NOTE — NURSING NOTE
"Chief Complaint   Patient presents with     Back Pain       Initial /78 (BP Location: Right arm, Patient Position: Chair, Cuff Size: Adult Large)  Pulse 52  Temp 98  F (36.7  C) (Tympanic)  Resp 19  Ht 5' 8\" (1.727 m)  Wt 204 lb (92.5 kg)  SpO2 98%  BMI 31.02 kg/m2 Estimated body mass index is 31.02 kg/(m^2) as calculated from the following:    Height as of this encounter: 5' 8\" (1.727 m).    Weight as of this encounter: 204 lb (92.5 kg).  Medication Reconciliation: complete   Savanna Dang      "

## 2017-10-17 ENCOUNTER — TELEPHONE (OUTPATIENT)
Dept: FAMILY MEDICINE | Facility: OTHER | Age: 67
End: 2017-10-17

## 2017-10-17 DIAGNOSIS — S20.221A BACK CONTUSION, RIGHT, INITIAL ENCOUNTER: Primary | ICD-10-CM

## 2017-10-17 RX ORDER — HYDROCODONE BITARTRATE AND ACETAMINOPHEN 5; 325 MG/1; MG/1
1-2 TABLET ORAL EVERY 4 HOURS PRN
Qty: 30 TABLET | Refills: 0 | Status: SHIPPED | OUTPATIENT
Start: 2017-10-17 | End: 2017-10-20

## 2017-10-17 ASSESSMENT — ANXIETY QUESTIONNAIRES: GAD7 TOTAL SCORE: 2

## 2017-10-17 NOTE — TELEPHONE ENCOUNTER
lortab given. Still use up to 800mg Motrin TID BUT NO tylenol since lortab has tylenol in it. Stick with Norflex.  Monitor for constipation. May add otc stool softner.

## 2017-10-17 NOTE — TELEPHONE ENCOUNTER
"8:35 AM    Reason for Call: Phone Call    Description: Patient was in yesterday to see Dr. Brooke for back pain. Patient calling to request a pain medication and states \" I can't lay down and I can't stand up. I'm in so much pain.\" Patient uses [facility].    Was an appointment offered for this call? No  If yes : Appointment type              Date    Preferred method for responding to this message: Telephone Call  What is your phone number ?    If we cannot reach you directly, may we leave a detailed response at the number you provided? Yes    Can this message wait until your PCP/provider returns, if available today? Not applicable,     Tiki Vázquez"

## 2017-10-18 ENCOUNTER — HOSPITAL ENCOUNTER (OUTPATIENT)
Dept: PHYSICAL THERAPY | Facility: HOSPITAL | Age: 67
Setting detail: THERAPIES SERIES
End: 2017-10-18
Attending: FAMILY MEDICINE
Payer: COMMERCIAL

## 2017-10-18 PROCEDURE — 97140 MANUAL THERAPY 1/> REGIONS: CPT | Mod: GP

## 2017-10-18 PROCEDURE — 97110 THERAPEUTIC EXERCISES: CPT | Mod: GP

## 2017-10-18 PROCEDURE — 40000718 ZZHC STATISTIC PT DEPARTMENT ORTHO VISIT

## 2017-10-18 PROCEDURE — 97161 PT EVAL LOW COMPLEX 20 MIN: CPT | Mod: GP

## 2017-10-18 NOTE — PROGRESS NOTES
10/18/17 1500   The [facility] STarT Back Screening Tool (  [facility] 01/08/07, Funded by [employer]) Used with permission   1  My back pain has spread down my leg(s) at some time in the last 2 weeks 0   2  I have had pain in the shoulder or neck at some time in the last 2 weeks 0   3  I have only walked short distances because of my back pain 0   4  In the last 2 weeks, I have dressed more slowly than usual because of back pain (!) 1   5  It s not really safe for a person with a condition like mine to be physically active (!) 1   6  Worrying thoughts have been going through my mind a lot of the time 0   7  I feel that my back pain is terrible and it s never going to get any better (!) 1   8  In general I have not enjoyed all the things I used to enjoy (!) 1   9.  Overall, how bothersome has your back pain been in the last 2 weeks? 1   The [facility] STarT Back Tool Scores    Sub-Score (Q5-9) 4   Total Score (all 9) 5   Risk: HIGH

## 2017-10-19 NOTE — PROGRESS NOTES
10/18/17 1302   General Information   Type of Visit Initial OP Ortho PT Evaluation   Start of Care Date 10/18/17   Referring Physician Dr. Brooke   Patient/Family Goals Statement to be able to perform prior activities without pain   Orders Evaluate and Treat   Orders Comment Good HEP   Date of Order 10/16/17   Insurance Type Other  ([employer] Advantage)   Insurance Comments/Visits Authorized No limits   Medical Diagnosis Acute right-sided low back pain without sciatica M54.5  - Primary    Surgical/Medical history reviewed Yes   Precautions/Limitations no known precautions/limitations   General Information Comments PMH: R TSA and R DIMAS   Body Part(s)   Body Part(s) Lumbar Spine/SI   Presentation and Etiology   Pertinent history of current problem (include personal factors and/or comorbidities that impact the POC) Patient states on 10/12/17 he was stepping onto boat when he slipped and landed on edge of boat directly on his back going into an extended position. Patient has had moderate to severe pain ever since. Had mild back issues present prior to onset of symptoms. Occasionally saw chiropractor , has not seen chiropractor since the injury. Patient saw Dr. Brooke and was given pain medication, muscle relaxers and ibuprophen. At times patient is able to perform activites without significant limitation from pain other times activity is very limited. Patient states that standing from chair is very aggravating. When patient is sleeping someimes back pain is greatly limiting. Denies pain or numbness and tingling down LEs. No weakness or foot drop. Patient is typically active with recreational exercise and activities in the community. Wants to get back to these activities without pain. Had bruising and scrapes on back after fall but this has improved.    Impairments A. Pain;E. Decreased flexibility;F. Decreased strength and endurance;G. Impaired balance;H. Impaired gait   Functional Limitations perform activities  of daily living;perform desired leisure / sports activities   Symptom Location LBP R > L   How/Where did it occur With a fall   Onset date of current episode/exacerbation 10/12/17   Chronicity New   Pain rating (0-10 point scale) Best (/10);Worst (/10)   Best (/10) 5   Worst (/10) 10   Pain quality A. Sharp;F. Stabbing   Frequency of pain/symptoms B. Intermittent   Pain/symptoms exacerbated by A. Sitting;G. Certain positions;I. Bending   Pain/symptoms eased by C. Rest;F. Certain positions  (prescription pain medications)   Progression of symptoms since onset: Unchanged   Current / Previous Interventions   Diagnostic Tests: X-ray   X-ray Results unremarkable   Prior Level of Function   Prior Level of Function-Mobility Prior to fall patient with high level adult male active in community, recreational hobbies and recreational exercises   Current Level of Function   Patient role/employment history F. Retired   Fall Risk Screen   Per patient - Fall 2 or more times in past year? No   Per patient - Fall with injury in past year? Yes   Is patient a fall risk? Yes   Lumbar Spine/SI Objective Findings   Observation Patient is in distress and is guarded. Sit to stands are particularly painful and challenging for patient.    Posture Guarded, mild to moderate forward flexion at waist likely due to pain.    Gait/Locomotion Mildly antalgic   Balance/Proprioception (Single Leg Stance) Not assessed today.    Flexion ROM 70% limited by pain   Extension ROM 70% limited by pain   Right Side Bending ROM 80%   Left Side Bending ROM 80%   Lumbar ROM Comment Motion in itself is not significantly limited, but functional activities like sit to stand or bending over are very challening for patient with noticeable muscle guarding and functional loss of motion.    Transversus Abdominus Strength (Dmitiry Leg Lowering-deg) Not assessed, but likely weakness and deficits present. Will assess at next session.    Lumbar/Hip/Knee/Foot Strength  Comments No significant weakness present in LEs. ABle to heel and toe walk without difficulty.    Hamstring Flexibility Tension present bilaterally, but may be related to guarding. Difficult to determine.    Hip Flexor Flexibility Tension present bilaterally, but may be related to guarding. Difficult to determine. Will continue to assess.    Piriformis Flexibility Tension present bilaterally, but may be related to guarding. Difficult to determine. Will continue to assess.    SLR No symptoms of parathesias in LEs.    Lumbar/SI Special Tests Comments Noted L lateral shift present. Trialed standing self correction with fair results.    Segmental Mobility PA glides reveal hypomobility at L5, L4, L3, L2. Did not appreciate any significant somatic dysfunction at lumbar segments in seated forward flexion and prone positioning.    Palpation QL tension and pain appreciated. Pain present with palpation to R transverse processes L3, L4, L5.    Clinical Impression   Criteria for Skilled Therapeutic Interventions Met yes, treatment indicated   PT Diagnosis Patient presents with LBP with significant muscle tension and guarding and pain limiting activity tolerance.    Influenced by the following impairments Pain, muscle tension, thoracolumbar mobility restriction   Functional limitations due to impairments Pain with sit to stand transfers, pain limiting sleep, decreased activity tolerance in home anc community with bending and lifting.    Clinical Presentation Stable/Uncomplicated   Clinical Presentation Rationale Clinical judgement   Clinical Decision Making (Complexity) Low complexity   Therapy Frequency 2 times/Week   Predicted Duration of Therapy Intervention (days/wks) 90 days   Risk & Benefits of therapy have been explained Yes   Patient, Family & other staff in agreement with plan of care Yes   Clinical Impression Comments Patient presents with subacute back pain after fall onto boat resulting in pain, motion restriction,  muscle tension and activity limitation. Patient is usually active in community and with recreational exercise. Soft tissue injury of muscle likely occurred with injury as patient had bruising that has now resolved.    Education Assessment   Barriers to Learning No barriers   ORTHO GOALS   PT Ortho Eval Goals 1;2;3;4   Ortho Goal 1   Goal Identifier STG 1   Goal Description Patient will be independent and compliant with HEP.    Target Date 11/01/17   Ortho Goal 2   Goal Identifier LTG 1   Goal Description Patient will be able to perform sit to stand transfers consistently without limitation from LBP.    Target Date 11/16/17   Ortho Goal 3   Goal Identifier LTG 2   Goal Description Patient will be able to return to prior activity level including bending, lifting, twisting and recreaional exercises without limitation from LBP symptoms.    Target Date 01/16/18   Ortho Goal 4   Goal Identifier LTG 3   Goal Description Patient will be able to consistently sleep without limitation from back pain symptoms.    Target Date 01/16/18   Total Evaluation Time   Total Evaluation Time 20   I certify the need for these services furnished under this plan of treatment and while under my care. (Physician co-signature of this document indicates review and certification of the therapy plan).

## 2017-10-20 ENCOUNTER — HOSPITAL ENCOUNTER (OUTPATIENT)
Dept: PHYSICAL THERAPY | Facility: HOSPITAL | Age: 67
Setting detail: THERAPIES SERIES
End: 2017-10-20
Attending: FAMILY MEDICINE
Payer: COMMERCIAL

## 2017-10-20 ENCOUNTER — TELEPHONE (OUTPATIENT)
Dept: FAMILY MEDICINE | Facility: OTHER | Age: 67
End: 2017-10-20

## 2017-10-20 DIAGNOSIS — S20.221A BACK CONTUSION, RIGHT, INITIAL ENCOUNTER: ICD-10-CM

## 2017-10-20 PROCEDURE — 40000718 ZZHC STATISTIC PT DEPARTMENT ORTHO VISIT

## 2017-10-20 PROCEDURE — 97035 APP MDLTY 1+ULTRASOUND EA 15: CPT | Mod: GP

## 2017-10-20 PROCEDURE — 97110 THERAPEUTIC EXERCISES: CPT | Mod: GP

## 2017-10-20 NOTE — TELEPHONE ENCOUNTER
8:58 AM    Reason for Call: Phone Call    Description: Pt called and states that he wants to see if he could get a refill on his HYDROcodone-acetaminophen (NORCO) 5-325 MG per tablet would like a call back regarding this.    Was an appointment offered for this call? No  If yes : Appointment type              Date    Preferred method for responding to this message: Telephone Call  What is your phone number ?    If we cannot reach you directly, may we leave a detailed response at the number you provided? Yes    Can this message wait until your PCP/provider returns, if available today? Not applicable,     Tianna Villalobos

## 2017-10-20 NOTE — TELEPHONE ENCOUNTER
It appears as if he received a prescription on the 10/17/2017.  At this time I cannot refill the medication. He should use his muscle relaxant and ibuprofen as directed.    Esther KHAN FNP-BC  Family Nurse Practitioner

## 2017-10-24 RX ORDER — HYDROCODONE BITARTRATE AND ACETAMINOPHEN 5; 325 MG/1; MG/1
TABLET ORAL
Qty: 30 TABLET | Refills: 0 | Status: SHIPPED | OUTPATIENT
Start: 2017-10-24 | End: 2018-01-25

## 2017-10-24 NOTE — TELEPHONE ENCOUNTER
Controlled Substance Refill Request for Norco  Problem List Complete:  No     PROVIDER TO CONSIDER COMPLETION OF PROBLEM LIST AND OVERVIEW/CONTROLLED SUBSTANCE AGREEMENT    Last Written Prescription Date:  10.17.17  Last Fill Quantity: 30,   # refills: 0    Last Office Visit with [facility] primary care provider: 10.16.17    Future Office visit:     Controlled substance agreement on file: No.     Processing:  Patient will  in clinic     checked in past 6 months?  No, route to RN

## 2017-10-25 ENCOUNTER — HOSPITAL ENCOUNTER (OUTPATIENT)
Dept: PHYSICAL THERAPY | Facility: HOSPITAL | Age: 67
Setting detail: THERAPIES SERIES
End: 2017-10-25
Attending: FAMILY MEDICINE
Payer: COMMERCIAL

## 2017-10-25 PROCEDURE — 40000718 ZZHC STATISTIC PT DEPARTMENT ORTHO VISIT

## 2017-10-25 PROCEDURE — 97035 APP MDLTY 1+ULTRASOUND EA 15: CPT | Mod: GP

## 2017-10-25 PROCEDURE — 97110 THERAPEUTIC EXERCISES: CPT | Mod: GP

## 2017-10-27 ENCOUNTER — HOSPITAL ENCOUNTER (OUTPATIENT)
Dept: PHYSICAL THERAPY | Facility: HOSPITAL | Age: 67
Setting detail: THERAPIES SERIES
End: 2017-10-27
Attending: FAMILY MEDICINE
Payer: COMMERCIAL

## 2017-10-27 PROCEDURE — 97140 MANUAL THERAPY 1/> REGIONS: CPT | Mod: GP

## 2017-10-27 PROCEDURE — 40000718 ZZHC STATISTIC PT DEPARTMENT ORTHO VISIT

## 2017-10-31 ENCOUNTER — HOSPITAL ENCOUNTER (OUTPATIENT)
Dept: PHYSICAL THERAPY | Facility: HOSPITAL | Age: 67
Setting detail: THERAPIES SERIES
End: 2017-10-31
Attending: FAMILY MEDICINE
Payer: COMMERCIAL

## 2017-10-31 PROCEDURE — 97140 MANUAL THERAPY 1/> REGIONS: CPT | Mod: GP

## 2017-10-31 PROCEDURE — 97035 APP MDLTY 1+ULTRASOUND EA 15: CPT | Mod: GP

## 2017-10-31 PROCEDURE — 40000718 ZZHC STATISTIC PT DEPARTMENT ORTHO VISIT

## 2017-11-01 NOTE — PROGRESS NOTES
11/01/17 1000   Oswestry Disability Index (SUAD   Froylan Cason 1980 Version 2.1a, All rights reserved)   Section 1 - Pain intensity 0   Section 2 - Personal care (washing, dressing, etc.)  0   Section 3 - Lifting 0   Section 4 - Walking 0   Section 5 - Sitting 0   Section 6 - Standing 0   Section 7 - Sleeping 0   Section 8 - Sex life (if applicable) -1   Section 9 - Social life 0   Section 10 - Traveling 0   Sum 0   Count 9   Oswestry Score (%) 0 %

## 2017-11-03 ENCOUNTER — HOSPITAL ENCOUNTER (OUTPATIENT)
Dept: PHYSICAL THERAPY | Facility: HOSPITAL | Age: 67
Setting detail: THERAPIES SERIES
End: 2017-11-03
Attending: FAMILY MEDICINE
Payer: COMMERCIAL

## 2017-11-03 PROCEDURE — 40000718 ZZHC STATISTIC PT DEPARTMENT ORTHO VISIT

## 2017-11-03 PROCEDURE — 97110 THERAPEUTIC EXERCISES: CPT | Mod: GP

## 2018-01-25 ENCOUNTER — OFFICE VISIT (OUTPATIENT)
Dept: FAMILY MEDICINE | Facility: OTHER | Age: 68
End: 2018-01-25
Attending: FAMILY MEDICINE
Payer: COMMERCIAL

## 2018-01-25 VITALS
WEIGHT: 203 LBS | SYSTOLIC BLOOD PRESSURE: 132 MMHG | HEART RATE: 54 BPM | BODY MASS INDEX: 32.62 KG/M2 | TEMPERATURE: 97 F | HEIGHT: 66 IN | DIASTOLIC BLOOD PRESSURE: 70 MMHG | OXYGEN SATURATION: 98 %

## 2018-01-25 DIAGNOSIS — Z00.00 ROUTINE GENERAL MEDICAL EXAMINATION AT A HEALTH CARE FACILITY: ICD-10-CM

## 2018-01-25 DIAGNOSIS — E78.00 PURE HYPERCHOLESTEROLEMIA: ICD-10-CM

## 2018-01-25 DIAGNOSIS — I10 ESSENTIAL HYPERTENSION, BENIGN: ICD-10-CM

## 2018-01-25 DIAGNOSIS — N40.0 HYPERTROPHY OF PROSTATE WITHOUT URINARY OBSTRUCTION: ICD-10-CM

## 2018-01-25 DIAGNOSIS — F34.1 DYSTHYMIC DISORDER: ICD-10-CM

## 2018-01-25 DIAGNOSIS — Z23 NEED FOR PROPHYLACTIC VACCINATION AND INOCULATION AGAINST INFLUENZA: Primary | ICD-10-CM

## 2018-01-25 DIAGNOSIS — Z23 NEED FOR VACCINATION: ICD-10-CM

## 2018-01-25 PROCEDURE — 90662 IIV NO PRSV INCREASED AG IM: CPT | Performed by: FAMILY MEDICINE

## 2018-01-25 PROCEDURE — G0008 ADMIN INFLUENZA VIRUS VAC: HCPCS | Performed by: FAMILY MEDICINE

## 2018-01-25 PROCEDURE — 90670 PCV13 VACCINE IM: CPT | Performed by: FAMILY MEDICINE

## 2018-01-25 PROCEDURE — 99397 PER PM REEVAL EST PAT 65+ YR: CPT | Performed by: FAMILY MEDICINE

## 2018-01-25 PROCEDURE — G0009 ADMIN PNEUMOCOCCAL VACCINE: HCPCS | Performed by: FAMILY MEDICINE

## 2018-01-25 RX ORDER — SERTRALINE HYDROCHLORIDE 25 MG/1
25 TABLET, FILM COATED ORAL DAILY
Qty: 30 TABLET | Refills: 5 | Status: SHIPPED | OUTPATIENT
Start: 2018-01-25 | End: 2018-04-23

## 2018-01-25 ASSESSMENT — ANXIETY QUESTIONNAIRES
IF YOU CHECKED OFF ANY PROBLEMS ON THIS QUESTIONNAIRE, HOW DIFFICULT HAVE THESE PROBLEMS MADE IT FOR YOU TO DO YOUR WORK, TAKE CARE OF THINGS AT HOME, OR GET ALONG WITH OTHER PEOPLE: NOT DIFFICULT AT ALL
6. BECOMING EASILY ANNOYED OR IRRITABLE: SEVERAL DAYS
GAD7 TOTAL SCORE: 2
1. FEELING NERVOUS, ANXIOUS, OR ON EDGE: SEVERAL DAYS
2. NOT BEING ABLE TO STOP OR CONTROL WORRYING: NOT AT ALL
4. TROUBLE RELAXING: NOT AT ALL
5. BEING SO RESTLESS THAT IT IS HARD TO SIT STILL: NOT AT ALL
3. WORRYING TOO MUCH ABOUT DIFFERENT THINGS: NOT AT ALL
7. FEELING AFRAID AS IF SOMETHING AWFUL MIGHT HAPPEN: NOT AT ALL

## 2018-01-25 ASSESSMENT — PAIN SCALES - GENERAL: PAINLEVEL: NO PAIN (0)

## 2018-01-25 NOTE — NURSING NOTE
"Chief Complaint   Patient presents with     Physical       Initial /70  Pulse 54  Temp 97  F (36.1  C) (Tympanic)  Ht 5' 6.34\" (1.685 m)  Wt 203 lb (92.1 kg)  SpO2 98%  BMI 32.43 kg/m2 Estimated body mass index is 32.43 kg/(m^2) as calculated from the following:    Height as of this encounter: 5' 6.34\" (1.685 m).    Weight as of this encounter: 203 lb (92.1 kg).  Medication Reconciliation: complete     Aleta Weeks    "

## 2018-01-25 NOTE — PROGRESS NOTES
SUBJECTIVE:   Oswald Goel is a 67 year old male who presents for Preventive Visit.      Are you in the first 12 months of your Medicare Part B coverage?  No    Healthy Habits:    Do you get at least three servings of calcium containing foods daily (dairy, green leafy vegetables, etc.)?no    Amount of exercise or daily activities, outside of work: 15 minutes 7 day(s) per week    Problems taking medications regularly No    Medication side effects: No    Have you had an eye exam in the past two years? yes    Do you see a dentist twice per year? yes    Do you have sleep apnea, excessive snoring or daytime drowsiness?no      Ability to successfully perform activities of daily living: Yes, no assistance needed    Home safety:  none identified     Hearing impairment: No    Fall risk:  Fallen 2 or more times in the past year?: No  Any fall with injury in the past year?: No        COGNITIVE SCREEN  1) Repeat 3 items (Banana, Sunrise, Chair)    2) Clock draw: took 2 trys and about 2.5 minutes to think of how to make a clock  3) 3 item recall: Recalls 2 objects   Results: NORMAL clock, 1-2 items recalled: COGNITIVE IMPAIRMENT LESS LIKELY    Mini-CogTM Copyright S Claude. Licensed by the author for use in [facility]; reprinted with permission ([email or web address]). All rights reserved.            Abnormal Mood Symptoms      Duration: a year    Description:  Depression: YES  Anxiety: no   Panic attacks: no      Accompanying signs and symptoms: see PHQ-9 and SILVIA scores    History (similar episodes/previous evaluation): yes when working at [employer]    Precipitating or alleviating factors: None    Therapies tried and outcome: Celexa (Citalopram)  He has some troubles with sleep and just feeling down is not suicidal, homicidal.  No problems with drugs or alcohol    Reviewed and updated as needed this visit by clinical staff  Tobacco  Allergies  Meds  Med Hx  Surg Hx  Fam Hx  Soc Hx        Reviewed and updated as  needed this visit by Provider        Social History   Substance Use Topics     Smoking status: Former Smoker     Types: Cigarettes     Smokeless tobacco: Former User      Comment: no passive exposure, tried to quit-yes     Alcohol use Yes      Comment: 2 glasses, daily, yesterday       If you drink alcohol do you typically have >3 drinks per day or >7 drinks per week? Yes - AUDIT SCORE:     No flowsheet data found.                        Today's PHQ-2 Score: No flowsheet data found.    Do you feel safe in your environment - Yes    Do you have a Health Care Directive?: No: Advance care planning was reviewed with patient; patient declined at this time.    Current providers sharing in care for this patient include:   Patient Care Team:  ALDEN Lowery MD as PCP - General (Family Practice)    The following health maintenance items are reviewed in Epic and correct as of today:  Health Maintenance   Topic Date Due     HEPATITIS C SCREENING  12/09/1966     PNEUMOCOCCAL (1 of 2 - PCV13) 12/09/2013     INFLUENZA VACCINE (SYSTEM ASSIGNED)  09/01/2017     PHQ-9 Q6 MONTHS  04/16/2018     FALL RISK ASSESSMENT  10/16/2018     SILVIA QUESTIONNAIRE 1 YEAR  10/16/2018     TETANUS IMMUNIZATION (SYSTEM ASSIGNED)  04/30/2020     COLON CANCER SCREEN (SYSTEM ASSIGNED)  03/01/2021     ADVANCE DIRECTIVE PLANNING Q5 YRS  08/10/2021     LIPID SCREEN Q5 YR MALE (SYSTEM ASSIGNED)  09/29/2021     AORTIC ANEURYSM SCREENING (SYSTEM ASSIGNED)  Completed     Patient Active Problem List   Diagnosis     Advanced care planning/counseling discussion     Pure hypercholesterolemia     Hypertrophy of prostate without urinary obstruction     Nonallopathic lesion of cervical region     Hypertension, Benign     RBBB     Primary localized osteoarthrosis, pelvic region and thigh     ACP (advance care planning)     Past Surgical History:   Procedure Laterality Date     COLONOSCOPY  03-    repeat 10 yrs     COLONOSCOPY  2005     HERNIA REPAIR       left  "arthroscopy       RELEASE CARPAL TUNNEL      RT     TONSILLECTOMY       TRANSPOSITION ULNAR NERVE (ELBOW) Left 11/19/2015    Procedure: TRANSPOSITION ULNAR NERVE (ELBOW);  Surgeon: Mahesh Capps MD;  Location: [facility]       Social History   Substance Use Topics     Smoking status: Former Smoker     Types: Cigarettes     Smokeless tobacco: Former User      Comment: no passive exposure, tried to quit-yes     Alcohol use Yes      Comment: 2 glasses, daily, yesterday     Family History   Problem Relation Age of Onset     CANCER Mother      Pancreatic     HEART DISEASE Mother      Heart Surgery         Current Outpatient Prescriptions   Medication Sig Dispense Refill     sertraline (ZOLOFT) 25 MG tablet Take 1 tablet (25 mg) by mouth daily 30 tablet 5     simvastatin (ZOCOR) 20 MG tablet TAKE 1 TABLET DAILY. 90 tablet 0     lisinopril (PRINIVIL/ZESTRIL) 5 MG tablet TAKE (1) TABLET DAILY. 90 tablet 1     IBUPROFEN PO Take 200 mg by mouth every 8 hours as needed for moderate pain       fish oil-omega-3 fatty acids (FISH OIL) 1000 MG capsule Take 1 g by mouth daily       ASPIRIN Aspirin, 81 mg aspirin daily       GARLIC PO Take one tablet daily       Multiple Vitamin (DAILY MULTIVITAMIN PO) Take 1 tablet by Oral route every day with food       Ascorbic Acid (VITAMIN C PO) Daily       No Known Allergies    Flu shot and pneumonia vaccines given today    ROS:  Constitutional, HEENT, cardiovascular, pulmonary, GI, , musculoskeletal, neuro, skin, endocrine and psych systems are negative, except as otherwise noted.    OBJECTIVE:   /70  Pulse 54  Temp 97  F (36.1  C) (Tympanic)  Ht 5' 6.34\" (1.685 m)  Wt 203 lb (92.1 kg)  SpO2 98%  BMI 32.43 kg/m2 Estimated body mass index is 32.43 kg/(m^2) as calculated from the following:    Height as of this encounter: 5' 6.34\" (1.685 m).    Weight as of this encounter: 203 lb (92.1 kg).  EXAM:   GENERAL: healthy, alert and no distress  EYES: Eyes grossly normal to inspection, " PERRL and conjunctivae and sclerae normal  HENT: ear canals and TM's normal, nose and mouth without ulcers or lesions  NECK: no adenopathy, no asymmetry, masses, or scars and thyroid normal to palpation  RESP: lungs clear to auscultation - no rales, rhonchi or wheezes  CV: regular rate and rhythm, normal S1 S2, no S3 or S4, no murmur, click or rub, no peripheral edema and peripheral pulses strong  ABDOMEN: soft, nontender, no hepatosplenomegaly, no masses and bowel sounds normal  : Normal testicles normal penis no inguinal adenopathy no inguinal hernia.  Prostate smooth nontender no nodules  MS: no gross musculoskeletal defects noted, no edema  SKIN: no suspicious lesions or rashes  NEURO: Normal strength and tone, mentation intact and speech normal  PSYCH: mentation appears normal, affect normal/bright    SILVIA-7 SCORE 2/21/2017 10/16/2017 1/25/2018   Total Score 5 2 2     PHQ-9 SCORE 2/21/2017 10/16/2017 1/25/2018   Total Score - - -   Total Score 3 5 5         ASSESSMENT / PLAN:       ICD-10-CM    1. Need for prophylactic vaccination and inoculation against influenza Z23 Vaccine Administration, Initial [93999]     Vaccine Administration, Each Additional [30620]     HC FLU VACCINE, INCREASED ANTIGEN, PRESV FREE   2. Need for vaccination Z23 Pneumococcal vaccine 13 valent PCV13 IM (Prevnar) [91188]   3. Hypertension, Benign I10 Basic metabolic panel     CBC with platelets differential   4. Hypertrophy of prostate without urinary obstruction N40.0 Prostate spec antigen screen   5. Pure hypercholesterolemia E78.00 Lipid Profile     AST   6. Dysthymic disorder F34.1 sertraline (ZOLOFT) 25 MG tablet   7. Routine general medical examination at a health care facility Z00.00  patient is up-to-date with colonoscopy.  Does have hypertension will check a BMP and a CBC.  Also for his history of BPH we will check a PSA.  Will check fasting lipid AST for his hypercholesterolemia.  Does have some dysthymia with some insomnia  "will treat with Zoloft 25 mg a day and see him in 1 month.  Flu shot and pneumonia vaccine were given today.       End of Life Planning:  Patient currently has an advanced directive:     COUNSELING:  Reviewed preventive health counseling, as reflected in patient instructions       Vision screening       Hearing screening       Colon cancer screening       Prostate cancer screening        Estimated body mass index is 32.43 kg/(m^2) as calculated from the following:    Height as of this encounter: 5' 6.34\" (1.685 m).    Weight as of this encounter: 203 lb (92.1 kg).  Weight management plan: diet and exercise     reports that he has quit smoking. His smoking use included Cigarettes. He has quit using smokeless tobacco.      Appropriate preventive services were discussed with this patient, including applicable screening as appropriate for cardiovascular disease, diabetes, osteopenia/osteoporosis, and glaucoma.  As appropriate for age/gender, discussed screening for colorectal cancer, prostate cancer, breast cancer, and cervical cancer. Checklist reviewing preventive services available has been given to the patient.    Reviewed patients plan of care and provided an AVS. The  for Oswald meets the Care Plan requirement. This Care Plan has been established and reviewed with the .    Counseling Resources:  ATP IV Guidelines  Pooled Cohorts Equation Calculator  Breast Cancer Risk Calculator  FRAX Risk Assessment  ICSI Preventive Guidelines  Dietary Guidelines for Americans, 2010  [employer]'s MyPlate  ASA Prophylaxis  Lung CA Screening    R Raul Lowery MD  [facility] [place]    Injectable Influenza Immunization Documentation    1.  Is the person to be vaccinated sick today?   No    2. Does the person to be vaccinated have an allergy to a component   of the vaccine?   No  Egg Allergy Algorithm Link    3. Has the person to be vaccinated ever had a serious reaction   to influenza vaccine in the past?   No    4. Has the person " to be vaccinated ever had Guillain-Barré syndrome?   No    Form completed by Aleta Weeks

## 2018-01-25 NOTE — PATIENT INSTRUCTIONS
Preventive Health Recommendations:   Male Ages 65 and over    Yearly exam:             See your health care provider every year in order to  o   Review health changes.   o   Discuss preventive care.    o   Review your medicines if your doctor has prescribed any.    Talk with your health care provider about whether you should have a test to screen for prostate cancer (PSA).    Every 3 years, have a diabetes test (fasting glucose). If you are at risk for diabetes, you should have this test more often.    Every 5 years, have a cholesterol test. Have this test more often if you are at risk for high cholesterol or heart disease.     Every 10 years, have a colonoscopy. Or, have a yearly FIT test (stool test). These exams will check for colon cancer.    Talk to with your health care provider about screening for Abdominal Aortic Aneurysm if you have a family history of AAA or have a history of smoking.    Shots:     Get a flu shot each year.     Get a tetanus shot every 10 years.     Talk to your doctor about your pneumonia vaccines. There are now two you should receive - Pneumovax (PPSV 23) and Prevnar (PCV 13).     Talk to your doctor about a shingles vaccine.     Talk to your doctor about the hepatitis B vaccine.  Nutrition:     Eat at least 5 servings of fruits and vegetables each day.     Eat whole-grain bread, whole-wheat pasta and brown rice instead of white grains and rice.     Talk to your provider about Calcium and Vitamin D.   Lifestyle    Exercise for at least 150 minutes a week (30 minutes a day, 5 days a week). This will help you control your weight and prevent disease.     Limit alcohol to one drink per day.     No smoking.     Wear sunscreen to prevent skin cancer.     See your dentist every six months for an exam and cleaning.     See your eye doctor every 1 to 2 years to screen for conditions such as glaucoma, macular degeneration, cataracts, etc     Return in 1 month

## 2018-01-25 NOTE — MR AVS SNAPSHOT
After Visit Summary   1/25/2018    Oswald Goel    MRN: [id number]           Patient Information     Date Of Birth          1950        Visit Information        Provider Department      1/25/2018 9:45 AM ALDEN Lowery MD [facility] [place]        Today's Diagnoses     Need for prophylactic vaccination and inoculation against influenza    -  1    Need for vaccination        Hypertension, Benign        Hypertrophy of prostate without urinary obstruction        Pure hypercholesterolemia        Dysthymic disorder          Care Instructions      Preventive Health Recommendations:   Male Ages 65 and over    Yearly exam:             See your health care provider every year in order to  o   Review health changes.   o   Discuss preventive care.    o   Review your medicines if your doctor has prescribed any.    Talk with your health care provider about whether you should have a test to screen for prostate cancer (PSA).    Every 3 years, have a diabetes test (fasting glucose). If you are at risk for diabetes, you should have this test more often.    Every 5 years, have a cholesterol test. Have this test more often if you are at risk for high cholesterol or heart disease.     Every 10 years, have a colonoscopy. Or, have a yearly FIT test (stool test). These exams will check for colon cancer.    Talk to with your health care provider about screening for Abdominal Aortic Aneurysm if you have a family history of AAA or have a history of smoking.    Shots:     Get a flu shot each year.     Get a tetanus shot every 10 years.     Talk to your doctor about your pneumonia vaccines. There are now two you should receive - Pneumovax (PPSV 23) and Prevnar (PCV 13).     Talk to your doctor about a shingles vaccine.     Talk to your doctor about the hepatitis B vaccine.  Nutrition:     Eat at least 5 servings of fruits and vegetables each day.     Eat whole-grain bread, whole-wheat pasta and brown rice instead  of white grains and rice.     Talk to your provider about Calcium and Vitamin D.   Lifestyle    Exercise for at least 150 minutes a week (30 minutes a day, 5 days a week). This will help you control your weight and prevent disease.     Limit alcohol to one drink per day.     No smoking.     Wear sunscreen to prevent skin cancer.     See your dentist every six months for an exam and cleaning.     See your eye doctor every 1 to 2 years to screen for conditions such as glaucoma, macular degeneration, cataracts, etc     Return in 1 month          Follow-ups after your visit        Follow-up notes from your care team     Return in about 4 weeks (around 2/22/2018).      Your next 10 appointments already scheduled     Feb 26, 2018  8:30 AM CST   (Arrive by 8:15 AM)   Office Visit with ALDEN Lowery MD   [facility] [place] [facility]    [address]  [place] [address]   [phone]           Bring a current list of meds and any records pertaining to this visit.  For Physicals, please bring immunization records and any forms needing to be filled out.  Please arrive 15 minutes early to complete paperwork and register.              Future tests that were ordered for you today     Open Future Orders        Priority Expected Expires Ordered    Basic metabolic panel Routine 1/26/2018 3/25/2018 1/25/2018    CBC with platelets differential Routine 1/26/2018 3/25/2018 1/25/2018    Prostate spec antigen screen Routine 1/26/2018 3/25/2018 1/25/2018    Lipid Profile Routine 1/26/2018 3/25/2018 1/25/2018    AST Routine 1/26/2018 3/25/2018 1/25/2018            Who to contact     If you have questions or need follow up information about today's clinic visit or your schedule please contact [facility] directly at [phone].  Normal or non-critical lab and imaging results will be communicated to you by MyChart, letter or phone within 4 business days after the clinic has received the  "results. If you do not hear from us within 7 days, please contact the clinic through [employer] or phone. If you have a critical or abnormal lab result, we will notify you by phone as soon as possible.  Submit refill requests through [employer] or call your pharmacy and they will forward the refill request to us. Please allow 3 business days for your refill to be completed.          Additional Information About Your Visit        [employer] Information     [employer] lets you send messages to your doctor, view your test results, renew your prescriptions, schedule appointments and more. To sign up, go to [email or web address] . Click on \"Log in\" on the left side of the screen, which will take you to the Welcome page. Then click on \"Sign up Now\" on the right side of the page.     You will be asked to enter the access code listed below, as well as some personal information. Please follow the directions to create your username and password.     Your access code is: [email or web address]  Expires: 2018  9:59 AM     Your access code will  in 90 days. If you need help or a new code, please call your [place] clinic or [phone].        Care EveryWhere ID     This is your Care EveryWhere ID. This could be used by other organizations to access your [place] medical records  [id number]        Your Vitals Were     Pulse Temperature Height Pulse Oximetry BMI (Body Mass Index)       54 97  F (36.1  C) (Tympanic) 5' 6.34\" (1.685 m) 98% 32.43 kg/m2        Blood Pressure from Last 3 Encounters:   18 132/70   10/16/17 128/78   17 136/74    Weight from Last 3 Encounters:   18 203 lb (92.1 kg)   10/16/17 204 lb (92.5 kg)   17 198 lb (89.8 kg)              We Performed the Following     HC FLU VACCINE, INCREASED ANTIGEN, PRESV FREE     Pneumococcal vaccine 13 valent PCV13 IM (Prevnar) [34724]     Vaccine Administration, Each Additional [20928]     Vaccine Administration, Initial [78471]          Today's Medication " Changes          These changes are accurate as of 1/25/18  9:59 AM.  If you have any questions, ask your nurse or doctor.               Start taking these medicines.        Dose/Directions    sertraline 25 MG tablet   Commonly known as:  ZOLOFT   Used for:  Dysthymic disorder   Started by:  ALDEN Lowery MD        Dose:  25 mg   Take 1 tablet (25 mg) by mouth daily   Quantity:  30 tablet   Refills:  5            Where to get your medicines      These medications were sent to [facility] [address] [address]     Phone:  [phone]     sertraline 25 MG tablet                Primary Care Provider Office Phone # Fax #    ALDEN Lowery [phone]       [address] [address]        Equal Access to Services     RAJINDER CALIXTO : Hadii omer yepez hadasho Soomaali, waaxda luqadaha, qaybta kaalmada adeegyada, wilfredo palacios hayashley monahan . So [facility] [phone].    ATENCIÓN: Si habla español, tiene a boothe disposición servicios gratuitos de asistencia lingüística. Llame al [phone].    We comply with applicable federal civil rights laws and [place] laws. We do not discriminate on the basis of race, color, national origin, age, disability, sex, sexual orientation, or gender identity.            Thank you!     Thank you for choosing [facility]  for your care. Our goal is always to provide you with excellent care. Hearing back from our patients is one way we can continue to improve our services. Please take a few minutes to complete the written survey that you may receive in the mail after your visit with us. Thank you!             Your Updated Medication List - Protect others around you: Learn how to safely use, store and throw away your medicines at www.disposemymeds.org.          This list is accurate as of 1/25/18  9:59 AM.  Always use your most recent med list.                    Brand Name Dispense Instructions for use Diagnosis    ASPIRIN      Aspirin, 81 mg aspirin daily        DAILY MULTIVITAMIN PO      Take 1 tablet by Oral route every day with food        fish oil-omega-3 fatty acids 1000 MG capsule      Take 1 g by mouth daily        GARLIC PO      Take one tablet daily        IBUPROFEN PO      Take 200 mg by mouth every 8 hours as needed for moderate pain        lisinopril 5 MG tablet    PRINIVIL/ZESTRIL    90 tablet    TAKE (1) TABLET DAILY.    Essential hypertension       sertraline 25 MG tablet    ZOLOFT    30 tablet    Take 1 tablet (25 mg) by mouth daily    Dysthymic disorder       simvastatin 20 MG tablet    ZOCOR    90 tablet    TAKE 1 TABLET DAILY.    Pure hypercholesterolemia       VITAMIN C PO      Daily

## 2018-01-26 ASSESSMENT — ANXIETY QUESTIONNAIRES: GAD7 TOTAL SCORE: 2

## 2018-01-26 ASSESSMENT — PATIENT HEALTH QUESTIONNAIRE - PHQ9: SUM OF ALL RESPONSES TO PHQ QUESTIONS 1-9: 5

## 2018-01-31 DIAGNOSIS — I10 ESSENTIAL HYPERTENSION, BENIGN: ICD-10-CM

## 2018-01-31 DIAGNOSIS — E78.00 PURE HYPERCHOLESTEROLEMIA: ICD-10-CM

## 2018-01-31 DIAGNOSIS — N40.0 HYPERTROPHY OF PROSTATE WITHOUT URINARY OBSTRUCTION: ICD-10-CM

## 2018-01-31 LAB
ANION GAP SERPL CALCULATED.3IONS-SCNC: 7 MMOL/L (ref 3–14)
AST SERPL W P-5'-P-CCNC: 36 U/L (ref 0–45)
BASOPHILS # BLD AUTO: 0 10E9/L (ref 0–0.2)
BASOPHILS NFR BLD AUTO: 0 %
BUN SERPL-MCNC: 18 MG/DL (ref 7–30)
CALCIUM SERPL-MCNC: 8.7 MG/DL (ref 8.5–10.1)
CHLORIDE SERPL-SCNC: 104 MMOL/L (ref 94–109)
CHOLEST SERPL-MCNC: 161 MG/DL
CO2 SERPL-SCNC: 26 MMOL/L (ref 20–32)
CREAT SERPL-MCNC: 0.98 MG/DL (ref 0.66–1.25)
DIFFERENTIAL METHOD BLD: ABNORMAL
EOSINOPHIL # BLD AUTO: 0 10E9/L (ref 0–0.7)
EOSINOPHIL NFR BLD AUTO: 0 %
ERYTHROCYTE [DISTWIDTH] IN BLOOD BY AUTOMATED COUNT: 13.6 % (ref 10–15)
GFR SERPL CREATININE-BSD FRML MDRD: 76 ML/MIN/1.7M2
GLUCOSE SERPL-MCNC: 93 MG/DL (ref 70–99)
HCT VFR BLD AUTO: 38.2 % (ref 40–53)
HDLC SERPL-MCNC: 59 MG/DL
HGB BLD-MCNC: 13 G/DL (ref 13.3–17.7)
LDLC SERPL CALC-MCNC: 81 MG/DL
LYMPHOCYTES # BLD AUTO: 1.2 10E9/L (ref 0.8–5.3)
LYMPHOCYTES NFR BLD AUTO: 24 %
MCH RBC QN AUTO: 32 PG (ref 26.5–33)
MCHC RBC AUTO-ENTMCNC: 34 G/DL (ref 31.5–36.5)
MCV RBC AUTO: 94 FL (ref 78–100)
MONOCYTES # BLD AUTO: 0.4 10E9/L (ref 0–1.3)
MONOCYTES NFR BLD AUTO: 8 %
NEUTROPHILS # BLD AUTO: 3.4 10E9/L (ref 1.6–8.3)
NEUTROPHILS NFR BLD AUTO: 68 %
NONHDLC SERPL-MCNC: 102 MG/DL
PLATELET # BLD AUTO: 215 10E9/L (ref 150–450)
POTASSIUM SERPL-SCNC: 4.2 MMOL/L (ref 3.4–5.3)
PSA SERPL-ACNC: 0.21 UG/L (ref 0–4)
RBC # BLD AUTO: 4.06 10E12/L (ref 4.4–5.9)
SODIUM SERPL-SCNC: 137 MMOL/L (ref 133–144)
TRIGL SERPL-MCNC: 107 MG/DL
WBC # BLD AUTO: 5 10E9/L (ref 4–11)

## 2018-01-31 PROCEDURE — 84450 TRANSFERASE (AST) (SGOT): CPT | Mod: ZL | Performed by: FAMILY MEDICINE

## 2018-01-31 PROCEDURE — G0103 PSA SCREENING: HCPCS | Mod: ZL | Performed by: FAMILY MEDICINE

## 2018-01-31 PROCEDURE — 84153 ASSAY OF PSA TOTAL: CPT | Performed by: FAMILY MEDICINE

## 2018-01-31 PROCEDURE — 80061 LIPID PANEL: CPT | Mod: ZL | Performed by: FAMILY MEDICINE

## 2018-01-31 PROCEDURE — 80048 BASIC METABOLIC PNL TOTAL CA: CPT | Mod: ZL | Performed by: FAMILY MEDICINE

## 2018-01-31 PROCEDURE — 36415 COLL VENOUS BLD VENIPUNCTURE: CPT | Mod: ZL | Performed by: FAMILY MEDICINE

## 2018-01-31 PROCEDURE — 85025 COMPLETE CBC W/AUTO DIFF WBC: CPT | Mod: ZL | Performed by: FAMILY MEDICINE

## 2018-02-26 ENCOUNTER — OFFICE VISIT (OUTPATIENT)
Dept: FAMILY MEDICINE | Facility: OTHER | Age: 68
End: 2018-02-26
Attending: FAMILY MEDICINE
Payer: COMMERCIAL

## 2018-02-26 VITALS
TEMPERATURE: 97 F | DIASTOLIC BLOOD PRESSURE: 72 MMHG | BODY MASS INDEX: 32.78 KG/M2 | SYSTOLIC BLOOD PRESSURE: 142 MMHG | OXYGEN SATURATION: 96 % | HEART RATE: 53 BPM | WEIGHT: 204 LBS | HEIGHT: 66 IN

## 2018-02-26 DIAGNOSIS — I10 ESSENTIAL HYPERTENSION: ICD-10-CM

## 2018-02-26 DIAGNOSIS — F34.1 DYSTHYMIC DISORDER: Primary | ICD-10-CM

## 2018-02-26 PROCEDURE — 99213 OFFICE O/P EST LOW 20 MIN: CPT | Performed by: FAMILY MEDICINE

## 2018-02-26 PROCEDURE — G0463 HOSPITAL OUTPT CLINIC VISIT: HCPCS

## 2018-02-26 RX ORDER — LISINOPRIL 5 MG/1
10 TABLET ORAL DAILY
Qty: 90 TABLET | Refills: 1 | COMMUNITY
Start: 2018-02-26 | End: 2018-03-12

## 2018-02-26 ASSESSMENT — ANXIETY QUESTIONNAIRES
3. WORRYING TOO MUCH ABOUT DIFFERENT THINGS: NOT AT ALL
IF YOU CHECKED OFF ANY PROBLEMS ON THIS QUESTIONNAIRE, HOW DIFFICULT HAVE THESE PROBLEMS MADE IT FOR YOU TO DO YOUR WORK, TAKE CARE OF THINGS AT HOME, OR GET ALONG WITH OTHER PEOPLE: SOMEWHAT DIFFICULT
5. BEING SO RESTLESS THAT IT IS HARD TO SIT STILL: NOT AT ALL
4. TROUBLE RELAXING: NOT AT ALL
2. NOT BEING ABLE TO STOP OR CONTROL WORRYING: NOT AT ALL
6. BECOMING EASILY ANNOYED OR IRRITABLE: SEVERAL DAYS
GAD7 TOTAL SCORE: 3
7. FEELING AFRAID AS IF SOMETHING AWFUL MIGHT HAPPEN: SEVERAL DAYS
1. FEELING NERVOUS, ANXIOUS, OR ON EDGE: SEVERAL DAYS

## 2018-02-26 ASSESSMENT — PAIN SCALES - GENERAL: PAINLEVEL: MODERATE PAIN (4)

## 2018-02-26 NOTE — PROGRESS NOTES
SUBJECTIVE:   Oswald Goel is a 67 year old male who presents to clinic today for the following health issues:      Abnormal Mood Symptoms      Duration: 6 MOnths    Description:  Depression: YES  Anxiety: YES- A Little  Panic attacks: no      Accompanying signs and symptoms: see PHQ-9 and SILVIA scores    History (similar episodes/previous evaluation): None    Precipitating or alleviating factors: None    Therapies tried and outcome: Zoloft (Sertraline) feels the low-dose Zoloft is controlling his mood.  Does not feel he needs an increase    Also his blood pressure is up a little he has been on 5 of lisinopril no chest pain or shortness of breath or headache.  [facility]    Oswald Goel, 67 year old, male presents with   Chief Complaint   Patient presents with     Depression     1 Month Follow Up       PAST MEDICAL HISTORY:  Past Medical History:   Diagnosis Date     Depressive disorder, not elsewhere classified 02/22/2011     Hypertension, Benign 02/22/2011     Hypertrophy (benign) of prostate without urinary o 10/10/2007     Nonallopathic lesion of cervical region, not elsewhere classified 08/27/2008     Pure hypercholesterolemia 12/29/1999     RBBB 02/22/2011       PAST SURGICAL HISTORY:  Past Surgical History:   Procedure Laterality Date     COLONOSCOPY  03-    repeat 10 yrs     COLONOSCOPY  2005     HERNIA REPAIR       left arthroscopy       RELEASE CARPAL TUNNEL      RT     TONSILLECTOMY       TRANSPOSITION ULNAR NERVE (ELBOW) Left 11/19/2015    Procedure: TRANSPOSITION ULNAR NERVE (ELBOW);  Surgeon: Mahesh Capps MD;  Location: [facility]       MEDICATIONS:  Prior to Admission medications    Medication Sig Start Date End Date Taking? Authorizing Provider   lisinopril (PRINIVIL/ZESTRIL) 5 MG tablet Take 2 tablets (10 mg) by mouth daily 2/26/18  Yes ALDEN Lowery MD   sertraline (ZOLOFT) 25 MG tablet Take 1 tablet (25 mg) by mouth daily 1/25/18  Yes ALDEN Lowery MD  "  simvastatin (ZOCOR) 20 MG tablet TAKE 1 TABLET DAILY. 10/13/17  Yes Isidro Chavez MD   IBUPROFEN PO Take 200 mg by mouth every 8 hours as needed for moderate pain   Yes Reported, Patient   fish oil-omega-3 fatty acids (FISH OIL) 1000 MG capsule Take 1 g by mouth daily   Yes Reported, Patient   ASPIRIN Aspirin, 81 mg aspirin daily   Yes Reported, Patient   GARLIC PO Take one tablet daily   Yes Reported, Patient   Multiple Vitamin (DAILY MULTIVITAMIN PO) Take 1 tablet by Oral route every day with food   Yes Reported, Patient   Ascorbic Acid (VITAMIN C PO) Daily   Yes Reported, Patient       ALLERGIES:   No Known Allergies    ROS:  Constitutional, HEENT, cardiovascular, pulmonary, gi and gu systems are negative, except as otherwise noted.      EXAM:  /72  Pulse 53  Temp 97  F (36.1  C)  Ht 5' 6\" (1.676 m)  Wt 204 lb (92.5 kg)  SpO2 96%  BMI 32.93 kg/m2 Body mass index is 32.93 kg/(m^2).   GENERAL APPEARANCE: healthy, alert and no distress  EYES: Eyes grossly normal to inspection, PERRL and conjunctivae and sclerae normal  RESP: lungs clear to auscultation - no rales, rhonchi or wheezes  NEURO: Normal strength and tone, mentation intact and speech normal  PSYCH: mentation appears normal and affect normal  Lab/ X-ray  No results found for this or any previous visit (from the past 24 hour(s)).    PHQ9 5, SILVIA 3    ASSESSMENT/PLAN:    ICD-10-CM    1. Dysthymic disorder F34.1  SILVIA PHQ 9 numbers are stable he is feeling better keep with the 25 mg.   2. Essential hypertension I10 lisinopril (PRINIVIL/ZESTRIL) 5 MG tablet he will increase his lisinopril from 5-10 mg a day will see him in 2 weeks to recheck blood pressure.         ADRI Lowery MD  February 26, 2018          "

## 2018-02-26 NOTE — NURSING NOTE
"Chief Complaint   Patient presents with     Depression     1 Month Follow Up       Initial /88  Pulse 53  Temp 97  F (36.1  C)  Ht 5' 6\" (1.676 m)  Wt 204 lb (92.5 kg)  SpO2 96%  BMI 32.93 kg/m2 Estimated body mass index is 32.93 kg/(m^2) as calculated from the following:    Height as of this encounter: 5' 6\" (1.676 m).    Weight as of this encounter: 204 lb (92.5 kg).  Medication Reconciliation: complete   [facility]   Medical Assistant      "

## 2018-02-26 NOTE — MR AVS SNAPSHOT
After Visit Summary   2/26/2018    Oswald Goel    MRN: [id number]           Patient Information     Date Of Birth          1950        Visit Information        Provider Department      2/26/2018 8:30 AM ALDEN Lowery MD [facility] [place]        Today's Diagnoses     Essential hypertension          Care Instructions    Return in 2 weeks. Take 2 of the lisinopril  daily          Follow-ups after your visit        Follow-up notes from your care team     Return in about 2 weeks (around 3/12/2018).      Your next 10 appointments already scheduled     Mar 12, 2018  9:15 AM CDT   (Arrive by 9:00 AM)   Office Visit with ALDEN Lowery MD   [facility] [place] ([facility] )    [address]  [place] [address]   [phone]           Bring a current list of meds and any records pertaining to this visit.  For Physicals, please bring immunization records and any forms needing to be filled out.  Please arrive 15 minutes early to complete paperwork and register.              Who to contact     If you have questions or need follow up information about today's clinic visit or your schedule please contact [facility] directly at [phone].  Normal or non-critical lab and imaging results will be communicated to you by MyChart, letter or phone within 4 business days after the clinic has received the results. If you do not hear from us within 7 days, please contact the clinic through MyChart or phone. If you have a critical or abnormal lab result, we will notify you by phone as soon as possible.  Submit refill requests through Applifier or call your pharmacy and they will forward the refill request to us. Please allow 3 business days for your refill to be completed.          Additional Information About Your Visit        MyChart Information     Applifier lets you send messages to your doctor, view your test results, renew your prescriptions, schedule  "appointments and more. To sign up, go to [email or web address] . Click on \"Log in\" on the left side of the screen, which will take you to the Welcome page. Then click on \"Sign up Now\" on the right side of the page.     You will be asked to enter the access code listed below, as well as some personal information. Please follow the directions to create your username and password.     Your access code is: [email or web address]  Expires: 2018  9:59 AM     Your access code will  in 90 days. If you need help or a new code, please call your [place] clinic or [phone].        Care EveryWhere ID     This is your Care EveryWhere ID. This could be used by other organizations to access your [place] medical records  [id number]        Your Vitals Were     Pulse Temperature Height Pulse Oximetry BMI (Body Mass Index)       53 97  F (36.1  C) 5' 6\" (1.676 m) 96% 32.93 kg/m2        Blood Pressure from Last 3 Encounters:   18 142/72   18 132/70   10/16/17 128/78    Weight from Last 3 Encounters:   18 204 lb (92.5 kg)   18 203 lb (92.1 kg)   10/16/17 204 lb (92.5 kg)              Today, you had the following     No orders found for display         Today's Medication Changes          These changes are accurate as of 18  8:38 AM.  If you have any questions, ask your nurse or doctor.               These medicines have changed or have updated prescriptions.        Dose/Directions    lisinopril 5 MG tablet   Commonly known as:  PRINIVIL/ZESTRIL   This may have changed:  See the new instructions.   Used for:  Essential hypertension   Changed by:  ALDEN Lowery MD        Dose:  10 mg   Take 2 tablets (10 mg) by mouth daily   Quantity:  90 tablet   Refills:  1                Primary Care Provider Office Phone # Fax #    ALDEN Lowery [phone]       [facility] [place] [address]        Equal Access to Services     MIKE CALIXTO AH: renny Rasmussen " dinorah gayleedy sewellguy kennywilfredo metz ah. So [facility] [phone].    ATENCIÓN: Si shayne shook, tiene a boothe disposición servicios gratuitos de asistencia lingüística. Jahaira al [phone].    We comply with applicable federal civil rights laws and [place] laws. We do not discriminate on the basis of race, color, national origin, age, disability, sex, sexual orientation, or gender identity.            Thank you!     [facility] you for [facility]  [facility] your [facility]. Our goal is always to provide you with excellent care. Hearing back from our patients is one way we can continue to improve our services. Please take a few minutes to complete the written survey that you may receive in the mail after your visit with us. Thank you!             Your Updated Medication List - Protect others around you: Learn how to safely use, store and throw away your medicines at www.disposemymeds.org.          This list is accurate as of 2/26/18  8:38 AM.  Always use your most recent med list.                   Brand Name Dispense Instructions for use Diagnosis    ASPIRIN      Aspirin, 81 mg aspirin daily        DAILY MULTIVITAMIN PO      Take 1 tablet by Oral route every day with food        fish oil-omega-3 fatty acids 1000 MG capsule      Take 1 g by mouth daily        GARLIC PO      Take one tablet daily        IBUPROFEN PO      Take 200 mg by mouth every 8 hours as needed for moderate pain        lisinopril 5 MG tablet    PRINIVIL/ZESTRIL    90 tablet    Take 2 tablets (10 mg) by mouth daily    Essential hypertension       sertraline 25 MG tablet    ZOLOFT    30 tablet    Take 1 tablet (25 mg) by mouth daily    Dysthymic disorder       simvastatin 20 MG tablet    ZOCOR    90 tablet    TAKE 1 TABLET DAILY.    Pure hypercholesterolemia       VITAMIN C PO      Daily

## 2018-02-27 ASSESSMENT — ANXIETY QUESTIONNAIRES: GAD7 TOTAL SCORE: 3

## 2018-02-27 ASSESSMENT — PATIENT HEALTH QUESTIONNAIRE - PHQ9: SUM OF ALL RESPONSES TO PHQ QUESTIONS 1-9: 5

## 2018-02-28 ENCOUNTER — DOCUMENTATION ONLY (OUTPATIENT)
Dept: FAMILY MEDICINE | Facility: OTHER | Age: 68
End: 2018-02-28

## 2018-03-12 ENCOUNTER — OFFICE VISIT (OUTPATIENT)
Dept: FAMILY MEDICINE | Facility: OTHER | Age: 68
End: 2018-03-12
Attending: FAMILY MEDICINE
Payer: COMMERCIAL

## 2018-03-12 VITALS
TEMPERATURE: 96.8 F | BODY MASS INDEX: 32.93 KG/M2 | HEART RATE: 50 BPM | SYSTOLIC BLOOD PRESSURE: 140 MMHG | DIASTOLIC BLOOD PRESSURE: 70 MMHG | WEIGHT: 204 LBS | OXYGEN SATURATION: 98 %

## 2018-03-12 DIAGNOSIS — I10 ESSENTIAL HYPERTENSION: ICD-10-CM

## 2018-03-12 PROCEDURE — 99213 OFFICE O/P EST LOW 20 MIN: CPT | Performed by: FAMILY MEDICINE

## 2018-03-12 PROCEDURE — G0463 HOSPITAL OUTPT CLINIC VISIT: HCPCS

## 2018-03-12 RX ORDER — LISINOPRIL 20 MG/1
20 TABLET ORAL DAILY
Qty: 30 TABLET | Refills: 5 | Status: SHIPPED | OUTPATIENT
Start: 2018-03-12 | End: 2018-04-23

## 2018-03-12 ASSESSMENT — ANXIETY QUESTIONNAIRES
1. FEELING NERVOUS, ANXIOUS, OR ON EDGE: SEVERAL DAYS
6. BECOMING EASILY ANNOYED OR IRRITABLE: NOT AT ALL
IF YOU CHECKED OFF ANY PROBLEMS ON THIS QUESTIONNAIRE, HOW DIFFICULT HAVE THESE PROBLEMS MADE IT FOR YOU TO DO YOUR WORK, TAKE CARE OF THINGS AT HOME, OR GET ALONG WITH OTHER PEOPLE: SOMEWHAT DIFFICULT
5. BEING SO RESTLESS THAT IT IS HARD TO SIT STILL: NOT AT ALL
3. WORRYING TOO MUCH ABOUT DIFFERENT THINGS: SEVERAL DAYS
4. TROUBLE RELAXING: NOT AT ALL
2. NOT BEING ABLE TO STOP OR CONTROL WORRYING: SEVERAL DAYS
7. FEELING AFRAID AS IF SOMETHING AWFUL MIGHT HAPPEN: SEVERAL DAYS
GAD7 TOTAL SCORE: 4

## 2018-03-12 ASSESSMENT — PAIN SCALES - GENERAL: PAINLEVEL: NO PAIN (0)

## 2018-03-12 NOTE — NURSING NOTE
"Chief Complaint   Patient presents with     Hypertension       Initial /84 (BP Location: Left arm, Patient Position: Chair, Cuff Size: Adult Large)  Pulse 50  Temp 96.8  F (36  C) (Tympanic)  Wt 204 lb (92.5 kg)  SpO2 98%  BMI 32.93 kg/m2 Estimated body mass index is 32.93 kg/(m^2) as calculated from the following:    Height as of 2/26/18: 5' 6\" (1.676 m).    Weight as of this encounter: 204 lb (92.5 kg).  Medication Reconciliation: complete     LEILA DE OLIVEIRA      "
decreased stride length/decreased step length

## 2018-03-12 NOTE — PROGRESS NOTES
SUBJECTIVE:   Oswald Goel is a 67 year old male who presents to clinic today for the following health issues:      Hypertension Follow-up      Outpatient blood pressures are not being checked.    Low Salt Diet: low salt      Amount of exercise or physical activity: 4-5 days/week for an average of greater than 60 minutes    Problems taking medications regularly: No    Medication side effects: none    Diet: low salt    Denies any chest pain or shortness of breath.  Has been taking 2 of the 5 mg tablets daily of the lisinopril  [facility] [place] [facility]    Oswald Goel, 67 year old, male presents with   Chief Complaint   Patient presents with     Hypertension       PAST MEDICAL HISTORY:  Past Medical History:   Diagnosis Date     Depressive disorder, not elsewhere classified 02/22/2011     Hypertension, Benign 02/22/2011     Hypertrophy (benign) of prostate without urinary o 10/10/2007     Nonallopathic lesion of cervical region, not elsewhere classified 08/27/2008     Pure hypercholesterolemia 12/29/1999     RBBB 02/22/2011       PAST SURGICAL HISTORY:  Past Surgical History:   Procedure Laterality Date     COLONOSCOPY  03-    repeat 10 yrs     COLONOSCOPY  2005     HERNIA REPAIR       left arthroscopy       RELEASE CARPAL TUNNEL      RT     TONSILLECTOMY       TRANSPOSITION ULNAR NERVE (ELBOW) Left 11/19/2015    Procedure: TRANSPOSITION ULNAR NERVE (ELBOW);  Surgeon: Mahesh Capps MD;  Location: [facility]       MEDICATIONS:  Prior to Admission medications    Medication Sig Start Date End Date Taking? Authorizing Provider   lisinopril (PRINIVIL/ZESTRIL) 20 MG tablet Take 1 tablet (20 mg) by mouth daily 3/12/18  Yes ALDEN Lowery MD   sertraline (ZOLOFT) 25 MG tablet Take 1 tablet (25 mg) by mouth daily 1/25/18  Yes ALDEN Lowery MD   simvastatin (ZOCOR) 20 MG tablet TAKE 1 TABLET DAILY. 10/13/17  Yes Isidro Chavez MD   IBUPROFEN PO Take 200 mg by mouth every 8 hours as needed for moderate  pain   Yes Reported, Patient   fish oil-omega-3 fatty acids (FISH OIL) 1000 MG capsule Take 1 g by mouth daily   Yes Reported, Patient   ASPIRIN 81 mg daily Aspirin, 81 mg aspirin daily     Yes Reported, Patient   GARLIC PO Take one tablet daily   Yes Reported, Patient   Multiple Vitamin (DAILY MULTIVITAMIN PO) Take 1 tablet by Oral route every day with food   Yes Reported, Patient   Ascorbic Acid (VITAMIN C PO) Daily   Yes Reported, Patient       ALLERGIES:   No Known Allergies    ROS:  Constitutional, HEENT, cardiovascular, pulmonary, gi and gu systems are negative, except as otherwise noted.      EXAM:  /70  Pulse 50  Temp 96.8  F (36  C) (Tympanic)  Wt 204 lb (92.5 kg)  SpO2 98%  BMI 32.93 kg/m2 Body mass index is 32.93 kg/(m^2).   GENERAL APPEARANCE: healthy, alert and no distress  EYES: Eyes grossly normal to inspection, PERRL and conjunctivae and sclerae normal  RESP: lungs clear to auscultation - no rales, rhonchi or wheezes  CV: regular rates and rhythm, normal S1 S2, no S3 or S4 and no murmur, click or rub  NEURO: Normal strength and tone, mentation intact and speech normal  Lab/ X-ray  Exam Date Exam Time Accession # Results    1/31/18 10:31 AM     Component Results   Component Value Flag Ref Range Units Status Collected Lab   Sodium 137  133 - 144 mmol/L Final 01/31/2018 10:31 AM [facility]   Potassium 4.2  3.4 - 5.3 mmol/L Final 01/31/2018 10:31 AM [facility]   Chloride 104  94 - 109 mmol/L Final 01/31/2018 10:31 AM [facility]   Carbon Dioxide 26  20 - 32 mmol/L Final 01/31/2018 10:31 AM [facility]   Anion Gap 7  3 - 14 mmol/L Final 01/31/2018 10:31 AM [facility]   Glucose 93  70 - 99 mg/dL Final 01/31/2018 10:31 AM [facility]   Comment:   Fasting specimen   Urea Nitrogen 18  7 - 30 mg/dL Final 01/31/2018 10:31 AM [facility]   Creatinine 0.98  0.66 - 1.25 mg/dL Final 01/31/2018 10:31 AM [facility]   GFR Estimate 76  >60 mL/min/1.7m2 Final 01/31/2018 10:31 AM [facility]   Comment:   Non  GFR Calc   GFR Estimate If Black >90  >60  mL/min/1.7m2 Final 01/31/2018 10:31 AM [facility]   Comment:   African American GFR Calc   Calcium 8.7  8.5 - 10.1 mg/dL Final 01/31/2018 10:31 AM [facility]       SILVIA-7 SCORE 1/25/2018 2/26/2018 3/12/2018   Total Score 2 3 4     PHQ-9 SCORE 1/25/2018 2/26/2018 3/12/2018   Total Score - - -   Total Score 5 5 2           ASSESSMENT/PLAN:    ICD-10-CM    1. Essential hypertension I10 lisinopril (PRINIVIL/ZESTRIL) 20 MG tablet   Will increase from 10-20 mg lisinopril a day will recheck blood pressure in 1 week.  He does drink 3 cups of coffee a day will try to back down on that.  He does not smoke      RMauricio Lowery MD  March 12, 2018

## 2018-03-12 NOTE — MR AVS SNAPSHOT
After Visit Summary   3/12/2018    Oswald Goel    MRN: [id number]           Patient Information     Date Of Birth          1950        Visit Information        Provider Department      3/12/2018 9:15 AM [facility]        Today's Diagnoses     Essential hypertension           Follow-ups after your visit        Follow-up notes from your care team     Return in about 1 week (around 3/19/2018).      Your next 10 appointments already scheduled     Mar 12, 2018  9:15 AM CDT   (Arrive by 9:00 AM)   Office Visit with ALDEN Lowery MD   [facility] [place] ([facility] )    [address] [address]   [phone]           Bring a current list of meds and any records pertaining to this visit.  For Physicals, please bring immunization records and any forms needing to be filled out.  Please arrive 15 minutes early to complete paperwork and register.            Mar 19, 2018  9:00 AM CDT   (Arrive by 8:45 AM)   Office Visit with ALDEN Lowery MD   [facility] [place] [facility]    [address]  [place] [address]   [phone]           Bring a current list of meds and any records pertaining to this visit.  For Physicals, please bring immunization records and any forms needing to be filled out.  Please arrive 15 minutes early to complete paperwork and register.              Who to contact     If you have questions or need follow up information about today's clinic visit or your schedule please contact [facility] directly at [phone].  Normal or non-critical lab and imaging results will be communicated to you by MyChart, letter or phone within 4 business days after the clinic has received the results. If you do not hear from us within 7 days, please contact the clinic through MyChart or phone. If you have a critical or abnormal lab result, we will notify you by phone as soon as  "possible.  Submit refill requests through Micreos or call your pharmacy and they will forward the refill request to us. Please allow 3 business days for your refill to be completed.          Additional Information About Your Visit        Micreos Information     Micreos lets you send messages to your doctor, view your test results, renew your prescriptions, schedule appointments and more. To sign up, go to [email or web address] . Click on \"Log in\" on the left side of the screen, which will take you to the Welcome page. Then click on \"Sign up Now\" on the right side of the page.     You will be asked to enter the access code listed below, as well as some personal information. Please follow the directions to create your username and password.     Your access code is: [email or web address]  Expires: 2018 10:59 AM     Your access code will  in 90 days. If you need help or a new code, please call your [place] clinic or [phone].        Care EveryWhere ID     This is your Care EveryWhere ID. This could be used by other organizations to access your [place] medical records  [id number]        Your Vitals Were     Pulse Temperature Pulse Oximetry BMI (Body Mass Index)          50 96.8  F (36  C) (Tympanic) 98% 32.93 kg/m2         Blood Pressure from Last 3 Encounters:   18 140/70   18 142/72   18 132/70    Weight from Last 3 Encounters:   18 204 lb (92.5 kg)   18 204 lb (92.5 kg)   18 203 lb (92.1 kg)              Today, you had the following     No orders found for display         Today's Medication Changes          These changes are accurate as of 3/12/18  9:14 AM.  If you have any questions, ask your nurse or doctor.               These medicines have changed or have updated prescriptions.        Dose/Directions    lisinopril 20 MG tablet   Commonly known as:  PRINIVIL/ZESTRIL   This may have changed:    - medication strength  - how much to take   Used for:  Essential " hypertension   Changed by:  ALDEN Lowrey MD        Dose:  20 mg   Take 1 tablet (20 mg) by mouth daily   Quantity:  30 tablet   Refills:  5            Where to get your medicines      These medications were sent to [facility] [id number] [address] [address]     Phone:  [phone]     lisinopril 20 MG tablet                Primary Care Provider Office Phone # Fax #    ALDEN Lowery [phone]       [facility] [facility] [address]        Equal Access to Services     [facility]: Hadii aad ku hadasho Soomaali, waaxda luqadaha, qaybta kaalmada adeegyada, waxrei palacios hayashley monahan . So [facility] [phone].    ATENCIÓN: Si habla español, tiene a boothe disposición servicios gratuitos de asistencia lingüística. [facility] [phone].    We comply with applicable federal civil rights laws and [place] laws. We do not discriminate on the basis of race, color, national origin, age, disability, sex, sexual orientation, or gender identity.            Thank you!     Thank you for choosing [facility]  for your care. Our goal is always to provide you with excellent care. Hearing back from our patients is one way we can continue to improve our services. Please take a few minutes to complete the written survey that you may receive in the mail after your visit with us. Thank you!             Your Updated Medication List - Protect others around you: Learn how to safely use, store and throw away your medicines at www.disposemymeds.org.          This list is accurate as of 3/12/18  9:14 AM.  Always use your most recent med list.                   Brand Name Dispense Instructions for use Diagnosis    ASPIRIN      81 mg daily Aspirin, 81 mg aspirin daily        DAILY MULTIVITAMIN PO      Take 1 tablet by Oral route every day with food        fish oil-omega-3 fatty acids 1000 MG capsule      Take 1 g by mouth daily         GARLIC PO      Take one tablet daily        IBUPROFEN PO      Take 200 mg by mouth every 8 hours as needed for moderate pain        lisinopril 20 MG tablet    PRINIVIL/ZESTRIL    30 tablet    Take 1 tablet (20 mg) by mouth daily    Essential hypertension       sertraline 25 MG tablet    ZOLOFT    30 tablet    Take 1 tablet (25 mg) by mouth daily    Dysthymic disorder       simvastatin 20 MG tablet    ZOCOR    90 tablet    TAKE 1 TABLET DAILY.    Pure hypercholesterolemia       VITAMIN C PO      Daily

## 2018-03-13 ASSESSMENT — ANXIETY QUESTIONNAIRES: GAD7 TOTAL SCORE: 4

## 2018-03-13 ASSESSMENT — PATIENT HEALTH QUESTIONNAIRE - PHQ9: SUM OF ALL RESPONSES TO PHQ QUESTIONS 1-9: 2

## 2018-03-19 ENCOUNTER — OFFICE VISIT (OUTPATIENT)
Dept: FAMILY MEDICINE | Facility: OTHER | Age: 68
End: 2018-03-19
Attending: FAMILY MEDICINE
Payer: COMMERCIAL

## 2018-03-19 VITALS
HEART RATE: 60 BPM | BODY MASS INDEX: 33.15 KG/M2 | SYSTOLIC BLOOD PRESSURE: 150 MMHG | TEMPERATURE: 97.1 F | OXYGEN SATURATION: 96 % | DIASTOLIC BLOOD PRESSURE: 80 MMHG | WEIGHT: 205.38 LBS

## 2018-03-19 DIAGNOSIS — I10 ESSENTIAL HYPERTENSION: Primary | ICD-10-CM

## 2018-03-19 PROCEDURE — 99213 OFFICE O/P EST LOW 20 MIN: CPT | Performed by: FAMILY MEDICINE

## 2018-03-19 PROCEDURE — G0463 HOSPITAL OUTPT CLINIC VISIT: HCPCS

## 2018-03-19 RX ORDER — AMLODIPINE BESYLATE 5 MG/1
5 TABLET ORAL DAILY
Qty: 30 TABLET | Refills: 5 | Status: SHIPPED | OUTPATIENT
Start: 2018-03-19 | End: 2018-09-17

## 2018-03-19 ASSESSMENT — ANXIETY QUESTIONNAIRES
4. TROUBLE RELAXING: NOT AT ALL
GAD7 TOTAL SCORE: 2
5. BEING SO RESTLESS THAT IT IS HARD TO SIT STILL: NOT AT ALL
2. NOT BEING ABLE TO STOP OR CONTROL WORRYING: NOT AT ALL
1. FEELING NERVOUS, ANXIOUS, OR ON EDGE: SEVERAL DAYS
IF YOU CHECKED OFF ANY PROBLEMS ON THIS QUESTIONNAIRE, HOW DIFFICULT HAVE THESE PROBLEMS MADE IT FOR YOU TO DO YOUR WORK, TAKE CARE OF THINGS AT HOME, OR GET ALONG WITH OTHER PEOPLE: NOT DIFFICULT AT ALL
3. WORRYING TOO MUCH ABOUT DIFFERENT THINGS: NOT AT ALL
7. FEELING AFRAID AS IF SOMETHING AWFUL MIGHT HAPPEN: SEVERAL DAYS
6. BECOMING EASILY ANNOYED OR IRRITABLE: NOT AT ALL

## 2018-03-19 ASSESSMENT — PAIN SCALES - GENERAL: PAINLEVEL: NO PAIN (0)

## 2018-03-19 NOTE — PROGRESS NOTES
SUBJECTIVE:                                                    Oswald Goel is a 67 year old male who presents to clinic today for the following health issues:      Hypertension Follow-up      Outpatient blood pressures are not being checked.    Low Salt Diet: no added salt      Amount of exercise or physical activity: 4-5 days/week for an average of greater than 60 minutes    Problems taking medications regularly: No    Medication side effects: none    Diet: regular (no restrictions)      He is now on 20 of lisinopril denies any chest pain or acute breathing difficulties.  [facility]    Oswald Goel, 67 year old, male presents with   Chief Complaint   Patient presents with     Hypertension       PAST MEDICAL HISTORY:  Past Medical History:   Diagnosis Date     Depressive disorder, not elsewhere classified 02/22/2011     Hypertension, Benign 02/22/2011     Hypertrophy (benign) of prostate without urinary o 10/10/2007     Nonallopathic lesion of cervical region, not elsewhere classified 08/27/2008     Pure hypercholesterolemia 12/29/1999     RBBB 02/22/2011       PAST SURGICAL HISTORY:  Past Surgical History:   Procedure Laterality Date     COLONOSCOPY  03-    repeat 10 yrs     COLONOSCOPY  2005     HERNIA REPAIR       left arthroscopy       RELEASE CARPAL TUNNEL      RT     TONSILLECTOMY       TRANSPOSITION ULNAR NERVE (ELBOW) Left 11/19/2015    Procedure: TRANSPOSITION ULNAR NERVE (ELBOW);  Surgeon: Mahesh Capps MD;  Location: [facility]       MEDICATIONS:  Prior to Admission medications    Medication Sig Start Date End Date Taking? Authorizing Provider   amLODIPine (NORVASC) 5 MG tablet Take 1 tablet (5 mg) by mouth daily 3/19/18  Yes ALDEN Lowery MD   lisinopril (PRINIVIL/ZESTRIL) 20 MG tablet Take 1 tablet (20 mg) by mouth daily 3/12/18  Yes ALDEN Lowery MD   sertraline (ZOLOFT) 25 MG tablet Take 1 tablet (25 mg) by mouth daily 1/25/18  Yes ALDEN Lowery MD    simvastatin (ZOCOR) 20 MG tablet TAKE 1 TABLET DAILY. 10/13/17  Yes Isidro Chavez MD   IBUPROFEN PO Take 200 mg by mouth every 8 hours as needed for moderate pain   Yes Reported, Patient   fish oil-omega-3 fatty acids (FISH OIL) 1000 MG capsule Take 1 g by mouth daily   Yes Reported, Patient   ASPIRIN 81 mg daily Aspirin, 81 mg aspirin daily     Yes Reported, Patient   Multiple Vitamin (DAILY MULTIVITAMIN PO) Take 1 tablet by Oral route every day with food   Yes Reported, Patient   Ascorbic Acid (VITAMIN C PO) Daily   Yes Reported, Patient   GARLIC PO Take one tablet daily    Reported, Patient       ALLERGIES:   No Known Allergies    ROS:  Constitutional, HEENT, cardiovascular, pulmonary, gi and gu systems are negative, except as otherwise noted.      EXAM:  /80  Pulse 60  Temp 97.1  F (36.2  C) (Tympanic)  Wt 205 lb 6 oz (93.2 kg)  SpO2 96%  BMI 33.15 kg/m2 Body mass index is 33.15 kg/(m^2).   GENERAL APPEARANCE: healthy, alert and no distress  EYES: Eyes grossly normal to inspection, PERRL and conjunctivae and sclerae normal  NECK: no adenopathy, no asymmetry, masses, or scars and thyroid normal to palpation  RESP: lungs clear to auscultation - no rales, rhonchi or wheezes  CV: regular rates and rhythm, normal S1 S2, no S3 or S4 and no murmur, click or rub  Lab/ X-ray  No results found for this or any previous visit (from the past 24 hour(s)).  SILVIA-7 SCORE 2/26/2018 3/12/2018 3/19/2018   Total Score 3 4 2     PHQ-9 SCORE 2/26/2018 3/12/2018 3/19/2018   Total Score - - -   Total Score 5 2 4       ASSESSMENT/PLAN:    ICD-10-CM    1. Essential hypertension I10 amLODIPine (NORVASC) 5 MG tablet     Blood pressure still elevated will keep with the 20 of lisinopril but add amlodipine 5 mg a day.  Will recheck blood pressure in about 2 weeks.  Keep working hard trying to cut down on caffeine intake    ADRI Lowery MD  March 19, 2018

## 2018-03-19 NOTE — LETTER
My Depression Action Plan  Name: Oswald Goel   Date of Birth 1950  Date: 3/19/2018    My doctor: ALDEN Lowery   My clinic: [facility] [place]  [place] [address]  [phone]          GREEN    ZONE   Good Control    What it looks like:     Things are going generally well. You have normal up s and down s. You may even feel depressed from time to time, but bad moods usually last less than a day.   What you need to do:  1. Continue to care for yourself (see self care plan)  2. Check your depression survival kit and update it as needed  3. Follow your physician s recommendations including any medication.  4. Do not stop taking medication unless you consult with your physician first.           YELLOW         ZONE Getting Worse    What it looks like:     Depression is starting to interfere with your life.     It may be hard to get out of bed; you may be starting to isolate yourself from others.    Symptoms of depression are starting to last most all day and this has happened for several days.     You may have suicidal thoughts but they are not constant.   What you need to do:     1. Call your care team, your response to treatment will improve if you keep your care team informed of your progress. Yellow periods are signs an adjustment may need to be made.     2. Continue your self-care, even if you have to fake it!    3. Talk to someone in your support network    4. Open up your depression survival kit           RED    ZONE Medical Alert - Get Help    What it looks like:     Depression is seriously interfering with your life.     You may experience these or other symptoms: You can t get out of bed most days, can t work or engage in other necessary activities, you have trouble taking care of basic hygiene, or basic responsibilities, thoughts of suicide or death that will not go away, self-injurious behavior.     What you need to do:  1. Call your care team and request a  same-day appointment. If they are not available (weekends or after hours) call your local crisis line, emergency room or 911.            Depression Self Care Plan / Survival Kit    Self-Care for Depression  Here s the deal. Your body and mind are really not as separate as most people think.  What you do and think affects how you feel and how you feel influences what you do and think. This means if you do things that people who feel good do, it will help you feel better.  Sometimes this is all it takes.  There is also a place for medication and therapy depending on how severe your depression is, so be sure to consult with your medical provider and/ or Behavioral Health Consultant if your symptoms are worsening or not improving.     In order to better manage my stress, I will:    Exercise  Get some form of exercise, every day. This will help reduce pain and release endorphins, the  feel good  chemicals in your brain. This is almost as good as taking antidepressants!  This is not the same as joining a gym and then never going! (they count on that by the way ) It can be as simple as just going for a walk or doing some gardening, anything that will get you moving.      Hygiene   Maintain good hygiene (Get out of bed in the morning, Make your bed, Brush your teeth, Take a shower, and Get dressed like you were going to work, even if you are unemployed).  If your clothes don't fit try to get ones that do.    Diet  I will strive to eat foods that are good for me, drink plenty of water, and avoid excessive sugar, caffeine, alcohol, and other mood-altering substances.  Some foods that are helpful in depression are: complex carbohydrates, B vitamins, flaxseed, fish or fish oil, fresh fruits and vegetables.    Psychotherapy  I agree to participate in Individual Therapy (if recommended).    Medication  If prescribed medications, I agree to take them.  Missing doses can result in serious side effects.  I understand that drinking  alcohol, or other illicit drug use, may cause potential side effects.  I will not stop my medication abruptly without first discussing it with my provider.    Staying Connected With Others  I will stay in touch with my friends, family members, and my primary care provider/team.    Use your imagination  Be creative.  We all have a creative side; it doesn t matter if it s oil painting, sand castles, or mud pies! This will also kick up the endorphins.    Witness Beauty  (AKA stop and smell the roses) Take a look outside, even in mid-winter. Notice colors, textures. Watch the squirrels and birds.     Service to others  Be of service to others.  There is always someone else in need.  By helping others we can  get out of ourselves  and remember the really important things.  This also provides opportunities for practicing all the other parts of the program.    Humor  Laugh and be silly!  Adjust your TV habits for less news and crime-drama and more comedy.    Control your stress  Try breathing deep, massage therapy, biofeedback, and meditation. Find time to relax each day.     My support system    Clinic Contact:  Phone number:    Contact 1:  Phone number:    Contact 2:  Phone number:    Uatsdin/:  Phone number:    Therapist:  Phone number:    Local crisis center:    Phone number:    Other community support:  Phone number:

## 2018-03-19 NOTE — NURSING NOTE
"Chief Complaint   Patient presents with     Hypertension       Initial /84  Pulse 60  Temp 97.1  F (36.2  C) (Tympanic)  Wt 205 lb 6 oz (93.2 kg)  SpO2 96%  BMI 33.15 kg/m2 Estimated body mass index is 33.15 kg/(m^2) as calculated from the following:    Height as of 2/26/18: 5' 6\" (1.676 m).    Weight as of this encounter: 205 lb 6 oz (93.2 kg).  Medication Reconciliation: complete   Aleta Weeks    "

## 2018-03-19 NOTE — MR AVS SNAPSHOT
"              After Visit Summary   3/19/2018    Oswald Goel    MRN: [id number]           Patient Information     Date Of Birth          1950        Visit Information        Provider Department      3/19/2018 9:00 AM ALDEN Lowery MD [facility]        Today's Diagnoses     Essential hypertension    -  1       Follow-ups after your visit        Follow-up notes from your care team     Return in about 2 weeks (around 4/2/2018).      Your next 10 appointments already scheduled     Apr 09, 2018 10:45 AM CDT   (Arrive by 10:30 AM)   Office Visit with ALDEN Lowery MD   [facility] ([facility] )    [address]  [facility] [address]   [phone]           Bring a current list of meds and any records pertaining to this visit.  For Physicals, please bring immunization records and any forms needing to be filled out.  Please arrive 15 minutes early to complete paperwork and register.              Who to contact     If you have questions or need follow up information about today's clinic visit or your schedule please contact [facility] directly at [phone].  Normal or non-critical lab and imaging results will be communicated to you by MyChart, letter or phone within 4 business days after the clinic has received the results. If you do not hear from us within 7 days, please contact the clinic through Plain[employer]hart or phone. If you have a critical or abnormal lab result, we will notify you by phone as soon as possible.  Submit refill requests through Picket or call your pharmacy and they will forward the refill request to us. Please allow 3 business days for your refill to be completed.          Additional Information About Your Visit        MyChart Information     Picket lets you send messages to your doctor, view your test results, renew your prescriptions, schedule appointments and more. To sign up, go to [email or web address] . Click on \"Log in\" " "on the left side of the screen, which will take you to the Welcome page. Then click on \"Sign up Now\" on the right side of the page.     You will be asked to enter the access code listed below, as well as some personal information. Please follow the directions to create your username and password.     Your access code is: [email or web address]  Expires: 2018 10:59 AM     Your access code will  in 90 days. If you need help or a new code, please call your [facility] or [phone].        Care EveryWhere ID     This is your Care EveryWhere ID. This could be used by other organizations to access your [facility] medical records  [id number]        Your Vitals Were     Pulse Temperature Pulse Oximetry BMI (Body Mass Index)          60 97.1  F (36.2  C) (Tympanic) 96% 33.15 kg/m2         Blood Pressure from Last 3 Encounters:   18 150/80   18 140/70   18 142/72    Weight from Last 3 Encounters:   18 205 lb 6 oz (93.2 kg)   18 204 lb (92.5 kg)   18 204 lb (92.5 kg)              Today, you had the following     No orders found for display         Today's Medication Changes          These changes are accurate as of 3/19/18  9:10 AM.  If you have any questions, ask your nurse or doctor.               Start taking these medicines.        Dose/Directions    amLODIPine 5 MG tablet   Commonly known as:  NORVASC   Used for:  Essential hypertension   Started by:  ALDEN Lowery MD        Dose:  5 mg   Take 1 tablet (5 mg) by mouth daily   Quantity:  30 tablet   Refills:  5            Where to get your medicines      These medications were sent to [facility] [address] [address]     Phone:  [phone]     amLODIPine 5 MG tablet                Primary Care Provider Office Phone # Fax #    ALDEN Lowery [phone]       [address] [place], [place] [address] [address]        Equal Access to Services     MIKE CALIXTO" AH: Hadii omer davissharlaaddie Yessenia, waaxda luqadaha, qaybta kaalerrol johnson, wilfredo ronin hayaaesdras nguyen bakari taniya guevara. So [facility] [phone].    ATENCIÓN: Si habla boone, tiene a boothe disposición servicios gratuitos de asistencia lingüística. Llame al [phone].    We comply with applicable federal civil rights laws and [place] laws. We do not discriminate on the basis of race, color, national origin, age, disability, sex, sexual orientation, or gender identity.            Thank you!     Thank you for choosing [facility]  for your [facility]. Our goal is always to provide you with excellent care. Hearing back from our patients is one way we can continue to improve our services. Please take a few minutes to complete the written survey that you may receive in the mail after your visit with us. Thank you!             Your Updated Medication List - Protect others around you: Learn how to safely use, store and throw away your medicines at www.disposemymeds.org.          This list is accurate as of 3/19/18  9:10 AM.  Always use your most recent med list.                   Brand Name Dispense Instructions for use Diagnosis    amLODIPine 5 MG tablet    NORVASC    30 tablet    Take 1 tablet (5 mg) by mouth daily    Essential hypertension       ASPIRIN      81 mg daily Aspirin, 81 mg aspirin daily        DAILY MULTIVITAMIN PO      Take 1 tablet by Oral route every day with food        fish oil-omega-3 fatty acids 1000 MG capsule      Take 1 g by mouth daily        GARLIC PO      Take one tablet daily        IBUPROFEN PO      Take 200 mg by mouth every 8 hours as needed for moderate pain        lisinopril 20 MG tablet    PRINIVIL/ZESTRIL    30 tablet    Take 1 tablet (20 mg) by mouth daily    Essential hypertension       sertraline 25 MG tablet    ZOLOFT    30 tablet    Take 1 tablet (25 mg) by mouth daily    Dysthymic disorder       simvastatin 20 MG tablet    ZOCOR    90 tablet    TAKE 1 TABLET DAILY.    Pure  hypercholesterolemia       VITAMIN C PO      Daily

## 2018-03-20 ASSESSMENT — ANXIETY QUESTIONNAIRES: GAD7 TOTAL SCORE: 2

## 2018-03-20 ASSESSMENT — PATIENT HEALTH QUESTIONNAIRE - PHQ9: SUM OF ALL RESPONSES TO PHQ QUESTIONS 1-9: 4

## 2018-04-09 ENCOUNTER — OFFICE VISIT (OUTPATIENT)
Dept: FAMILY MEDICINE | Facility: OTHER | Age: 68
End: 2018-04-09
Attending: FAMILY MEDICINE
Payer: COMMERCIAL

## 2018-04-09 VITALS
TEMPERATURE: 97.7 F | DIASTOLIC BLOOD PRESSURE: 70 MMHG | WEIGHT: 201 LBS | OXYGEN SATURATION: 96 % | SYSTOLIC BLOOD PRESSURE: 162 MMHG | BODY MASS INDEX: 32.44 KG/M2 | HEART RATE: 65 BPM

## 2018-04-09 DIAGNOSIS — I10 ESSENTIAL HYPERTENSION, BENIGN: Primary | ICD-10-CM

## 2018-04-09 PROCEDURE — G0463 HOSPITAL OUTPT CLINIC VISIT: HCPCS

## 2018-04-09 PROCEDURE — 99213 OFFICE O/P EST LOW 20 MIN: CPT | Performed by: FAMILY MEDICINE

## 2018-04-09 RX ORDER — CHLORTHALIDONE 25 MG/1
25 TABLET ORAL DAILY
Qty: 30 TABLET | Refills: 3 | Status: SHIPPED | OUTPATIENT
Start: 2018-04-09 | End: 2018-09-25

## 2018-04-09 ASSESSMENT — ANXIETY QUESTIONNAIRES
IF YOU CHECKED OFF ANY PROBLEMS ON THIS QUESTIONNAIRE, HOW DIFFICULT HAVE THESE PROBLEMS MADE IT FOR YOU TO DO YOUR WORK, TAKE CARE OF THINGS AT HOME, OR GET ALONG WITH OTHER PEOPLE: SOMEWHAT DIFFICULT
3. WORRYING TOO MUCH ABOUT DIFFERENT THINGS: SEVERAL DAYS
6. BECOMING EASILY ANNOYED OR IRRITABLE: SEVERAL DAYS
4. TROUBLE RELAXING: NOT AT ALL
1. FEELING NERVOUS, ANXIOUS, OR ON EDGE: SEVERAL DAYS
5. BEING SO RESTLESS THAT IT IS HARD TO SIT STILL: NOT AT ALL
2. NOT BEING ABLE TO STOP OR CONTROL WORRYING: SEVERAL DAYS
GAD7 TOTAL SCORE: 5
7. FEELING AFRAID AS IF SOMETHING AWFUL MIGHT HAPPEN: SEVERAL DAYS

## 2018-04-09 ASSESSMENT — PAIN SCALES - GENERAL: PAINLEVEL: NO PAIN (0)

## 2018-04-09 NOTE — NURSING NOTE
"Chief Complaint   Patient presents with     Hypertension       Initial /70 (BP Location: Left arm, Patient Position: Chair, Cuff Size: Adult Large)  Pulse 65  Temp 97.7  F (36.5  C) (Tympanic)  Wt 201 lb (91.2 kg)  SpO2 96%  BMI 32.44 kg/m2 Estimated body mass index is 32.44 kg/(m^2) as calculated from the following:    Height as of 2/26/18: 5' 6\" (1.676 m).    Weight as of this encounter: 201 lb (91.2 kg).  Medication Reconciliation: complete     LEILA DE OLIEVIRA      "

## 2018-04-09 NOTE — PROGRESS NOTES
SUBJECTIVE:   Oswald Goel is a 67 year old male who presents to clinic today for the following health issues:      Hypertension Follow-up      Outpatient blood pressures are not being checked.    Low Salt Diet: low salt      Amount of exercise or physical activity: 6-7 days/week for an average of greater than 60 minutes    Problems taking medications regularly: No    Medication side effects: none    Diet: low salt        No problems with alcohol.  Does not smoke.  No problems with taking NSAIDs.  No chest pain with activities.  No known history of sleep apnea.  He is on 2 meds now for blood pressure  [facility]    Oswald Goel, 67 year old, male presents with   Chief Complaint   Patient presents with     Hypertension       PAST MEDICAL HISTORY:  Past Medical History:   Diagnosis Date     Depressive disorder, not elsewhere classified 02/22/2011     Hypertension, Benign 02/22/2011     Hypertrophy (benign) of prostate without urinary o 10/10/2007     Nonallopathic lesion of cervical region, not elsewhere classified 08/27/2008     Pure hypercholesterolemia 12/29/1999     RBBB 02/22/2011       PAST SURGICAL HISTORY:  Past Surgical History:   Procedure Laterality Date     COLONOSCOPY  03-    repeat 10 yrs     COLONOSCOPY  2005     HERNIA REPAIR       left arthroscopy       RELEASE CARPAL TUNNEL      RT     TONSILLECTOMY       TRANSPOSITION ULNAR NERVE (ELBOW) Left 11/19/2015    Procedure: TRANSPOSITION ULNAR NERVE (ELBOW);  Surgeon: Mahesh Capps MD;  Location: [facility]       MEDICATIONS:  Prior to Admission medications    Medication Sig Start Date End Date Taking? Authorizing Provider   chlorthalidone (HYGROTON) 25 MG tablet Take 1 tablet (25 mg) by mouth daily 4/9/18  Yes ALDEN Lowery MD   amLODIPine (NORVASC) 5 MG tablet Take 1 tablet (5 mg) by mouth daily 3/19/18  Yes ALDEN Lowery MD   lisinopril (PRINIVIL/ZESTRIL) 20 MG tablet Take 1 tablet (20 mg) by mouth daily 3/12/18   Yes ALDEN Lowery MD   sertraline (ZOLOFT) 25 MG tablet Take 1 tablet (25 mg) by mouth daily 1/25/18  Yes ALDEN Lowery MD   simvastatin (ZOCOR) 20 MG tablet TAKE 1 TABLET DAILY. 10/13/17  Yes Isidro Chavez MD   IBUPROFEN PO Take 200 mg by mouth every 8 hours as needed for moderate pain   Yes Reported, Patient   fish oil-omega-3 fatty acids (FISH OIL) 1000 MG capsule Take 1 g by mouth daily   Yes Reported, Patient   ASPIRIN 81 mg daily Aspirin, 81 mg aspirin daily     Yes Reported, Patient   Multiple Vitamin (DAILY MULTIVITAMIN PO) Take 1 tablet by Oral route every day with food   Yes Reported, Patient   Ascorbic Acid (VITAMIN C PO) Daily   Yes Reported, Patient       ALLERGIES:   No Known Allergies    ROS:  Constitutional, HEENT, cardiovascular, pulmonary, gi and gu systems are negative, except as otherwise noted.      EXAM:  /70 (BP Location: Left arm, Patient Position: Chair, Cuff Size: Adult Large)  Pulse 65  Temp 97.7  F (36.5  C) (Tympanic)  Wt 201 lb (91.2 kg)  SpO2 96%  BMI 32.44 kg/m2 Body mass index is 32.44 kg/(m^2).   GENERAL APPEARANCE: healthy, alert and no distress  EYES: Eyes grossly normal to inspection, PERRL and conjunctivae and sclerae normal  NECK: no adenopathy, no asymmetry, masses, or scars and thyroid normal to palpation  RESP: lungs clear to auscultation - no rales, rhonchi or wheezes  CV: regular rates and rhythm, normal S1 S2, no S3 or S4 and no murmur, click or rub  ABDOMEN: soft, nontender, without hepatosplenomegaly or masses and bowel sounds normal  NEURO: Normal strength and tone, mentation intact and speech normal  Lab/ X-ray  Results for orders placed or performed in visit on 01/31/18   Basic metabolic panel   Result Value Ref Range    Sodium 137 133 - 144 mmol/L    Potassium 4.2 3.4 - 5.3 mmol/L    Chloride 104 94 - 109 mmol/L    Carbon Dioxide 26 20 - 32 mmol/L    Anion Gap 7 3 - 14 mmol/L    Glucose 93 70 - 99 mg/dL    Urea Nitrogen 18 7 - 30 mg/dL     Creatinine 0.98 0.66 - 1.25 mg/dL    GFR Estimate 76 >60 mL/min/1.7m2    GFR Estimate If Black >90 >60 mL/min/1.7m2    Calcium 8.7 8.5 - 10.1 mg/dL   CBC with platelets differential   Result Value Ref Range    WBC 5.0 4.0 - 11.0 10e9/L    RBC Count 4.06 (L) 4.4 - 5.9 10e12/L    Hemoglobin 13.0 (L) 13.3 - 17.7 g/dL    Hematocrit 38.2 (L) 40.0 - 53.0 %    MCV 94 78 - 100 fl    MCH 32.0 26.5 - 33.0 pg    MCHC 34.0 31.5 - 36.5 g/dL    RDW 13.6 10.0 - 15.0 %    Platelet Count 215 150 - 450 10e9/L    Diff Method Manual Differential     % Neutrophils 68.0 %    % Lymphocytes 24.0 %    % Monocytes 8.0 %    % Eosinophils 0.0 %    % Basophils 0.0 %    Absolute Neutrophil 3.4 1.6 - 8.3 10e9/L    Absolute Lymphocytes 1.2 0.8 - 5.3 10e9/L    Absolute Monocytes 0.4 0.0 - 1.3 10e9/L    Absolute Eosinophils 0.0 0.0 - 0.7 10e9/L    Absolute Basophils 0.0 0.0 - 0.2 10e9/L   Prostate spec antigen screen   Result Value Ref Range    PSA 0.21 0 - 4 ug/L   Lipid Profile   Result Value Ref Range    Cholesterol 161 <200 mg/dL    Triglycerides 107 <150 mg/dL    HDL Cholesterol 59 >39 mg/dL    LDL Cholesterol Calculated 81 <100 mg/dL    Non HDL Cholesterol 102 <130 mg/dL   AST   Result Value Ref Range    AST 36 0 - 45 U/L     SILVIA-7 SCORE 3/12/2018 3/19/2018 4/9/2018   Total Score 4 2 5     PHQ-9 SCORE 3/12/2018 3/19/2018 4/9/2018   Total Score - - -   Total Score 2 4 7         ASSESSMENT/PLAN:    ICD-10-CM    1. Essential hypertension, benign I10 chlorthalidone (HYGROTON) 25 MG tablet   Plan labs from January were stable.  Will add chlorthalidone 25 mg daily.  See him in 2 weeks to recheck blood pressure.  Keep with the lisinopril and amlodipine.      ADRI Lowery MD  April 9, 2018

## 2018-04-09 NOTE — MR AVS SNAPSHOT
"              After Visit Summary   2018    Oswald Geol    MRN: [id number]           Patient Information     Date Of Birth          1950        Visit Information        Provider Department      2018 10:45 AM [facility]        Today's Diagnoses     Essential hypertension, benign    -  1       Follow-ups after your visit        Who to contact     If you have questions or need follow up information about today's clinic visit or your schedule please contact [facility] directly at [phone].  Normal or non-critical lab and imaging results will be communicated to you by MyChart, letter or phone within 4 business days after the clinic has received the results. If you do not hear from us within 7 days, please contact the clinic through happnhart or phone. If you have a critical or abnormal lab result, we will notify you by phone as soon as possible.  Submit refill requests through [employer] or call your pharmacy and they will forward the refill request to us. Please allow 3 business days for your refill to be completed.          Additional Information About Your Visit        MyChart Information     [employer] lets you send messages to your doctor, view your test results, renew your prescriptions, schedule appointments and more. To sign up, go to [email or web address] . Click on \"Log in\" on the left side of the screen, which will take you to the Welcome page. Then click on \"Sign up Now\" on the right side of the page.     You will be asked to enter the access code listed below, as well as some personal information. Please follow the directions to create your username and password.     Your access code is: [email or web address]  Expires: 2018 10:59 AM     Your access code will  in 90 days. If you need help or a new code, please call your [facility] or [phone].        Care EveryWhere ID     This is your Care EveryWhere ID. This could be used by other " organizations to access your [place] medical records  [id number]        Your Vitals Were     Pulse Temperature Pulse Oximetry BMI (Body Mass Index)          65 97.7  F (36.5  C) (Tympanic) 96% 32.44 kg/m2         Blood Pressure from Last 3 Encounters:   04/09/18 162/70   03/19/18 150/80   03/12/18 140/70    Weight from Last 3 Encounters:   04/09/18 201 lb (91.2 kg)   03/19/18 205 lb 6 oz (93.2 kg)   03/12/18 204 lb (92.5 kg)              Today, you had the following     No orders found for display         Today's Medication Changes          These changes are accurate as of 4/9/18 10:47 AM.  If you have any questions, ask your nurse or doctor.               Start taking these medicines.        Dose/Directions    chlorthalidone 25 MG tablet   Commonly known as:  HYGROTON   Used for:  Essential hypertension, benign   Started by:  ALDEN Lowery MD        Dose:  25 mg   Take 1 tablet (25 mg) by mouth daily   Quantity:  30 tablet   Refills:  3         Stop taking these medicines if you haven't already. Please contact your care team if you have questions.     GARLIC PO   Stopped by:  ALDEN Lowery MD                Where to get your medicines      These medications were sent to [facility] [place] [id number]     Phone:  [phone]     chlorthalidone 25 MG tablet                Primary Care Provider Office Phone # Fax #    ALDEN Lowery [phone]       [address] [place] [address]        Equal Access to Services     [facility]: Hadii omer yepez hadasho Soomaali, waaxda luqadaha, qaybta kaalmada adeegyasrikanth, waxay idiin hayaan adeeg kharash la'aan . So [facility] [phone].    ATENCIÓN: Si habla español, tiene a boothe disposición servicios gratuitos de asistencia lingüística. Llame al [phone].    We comply with applicable federal civil rights laws and [place] laws. We do not discriminate on the basis of race, color,  national origin, age, disability, sex, sexual orientation, or gender identity.            Thank you!     Thank you for choosing [facility]  [facility] your [facility]. Our goal is always to provide you with excellent care. Hearing back from our patients is one way we can continue to improve our services. Please take a few minutes to complete the written survey that you may receive in the mail after your visit with us. Thank you!             Your Updated Medication List - Protect others around you: Learn how to safely use, store and throw away your medicines at www.disposemymeds.org.          This list is accurate as of 4/9/18 10:47 AM.  Always use your most recent med list.                   Brand Name Dispense Instructions for use Diagnosis    amLODIPine 5 MG tablet    NORVASC    30 tablet    Take 1 tablet (5 mg) by mouth daily    Essential hypertension       ASPIRIN      81 mg daily Aspirin, 81 mg aspirin daily        chlorthalidone 25 MG tablet    HYGROTON    30 tablet    Take 1 tablet (25 mg) by mouth daily    Essential hypertension, benign       DAILY MULTIVITAMIN PO      Take 1 tablet by Oral route every day with food        fish oil-omega-3 fatty acids 1000 MG capsule      Take 1 g by mouth daily        IBUPROFEN PO      Take 200 mg by mouth every 8 hours as needed for moderate pain        lisinopril 20 MG tablet    PRINIVIL/ZESTRIL    30 tablet    Take 1 tablet (20 mg) by mouth daily    Essential hypertension       sertraline 25 MG tablet    ZOLOFT    30 tablet    Take 1 tablet (25 mg) by mouth daily    Dysthymic disorder       simvastatin 20 MG tablet    ZOCOR    90 tablet    TAKE 1 TABLET DAILY.    Pure hypercholesterolemia       VITAMIN C PO      Daily

## 2018-04-10 ASSESSMENT — PATIENT HEALTH QUESTIONNAIRE - PHQ9: SUM OF ALL RESPONSES TO PHQ QUESTIONS 1-9: 7

## 2018-04-10 ASSESSMENT — ANXIETY QUESTIONNAIRES: GAD7 TOTAL SCORE: 5

## 2018-04-23 ENCOUNTER — OFFICE VISIT (OUTPATIENT)
Dept: FAMILY MEDICINE | Facility: OTHER | Age: 68
End: 2018-04-23
Attending: FAMILY MEDICINE
Payer: COMMERCIAL

## 2018-04-23 ENCOUNTER — TELEPHONE (OUTPATIENT)
Dept: FAMILY MEDICINE | Facility: OTHER | Age: 68
End: 2018-04-23

## 2018-04-23 VITALS
RESPIRATION RATE: 16 BRPM | BODY MASS INDEX: 29.86 KG/M2 | HEIGHT: 68 IN | WEIGHT: 197 LBS | DIASTOLIC BLOOD PRESSURE: 74 MMHG | OXYGEN SATURATION: 98 % | SYSTOLIC BLOOD PRESSURE: 136 MMHG | TEMPERATURE: 97.8 F | HEART RATE: 57 BPM

## 2018-04-23 DIAGNOSIS — F34.1 DYSTHYMIC DISORDER: ICD-10-CM

## 2018-04-23 DIAGNOSIS — E78.00 PURE HYPERCHOLESTEROLEMIA: ICD-10-CM

## 2018-04-23 DIAGNOSIS — I10 ESSENTIAL HYPERTENSION: ICD-10-CM

## 2018-04-23 DIAGNOSIS — I10 ESSENTIAL HYPERTENSION, BENIGN: ICD-10-CM

## 2018-04-23 DIAGNOSIS — I10 ESSENTIAL HYPERTENSION, BENIGN: Primary | ICD-10-CM

## 2018-04-23 DIAGNOSIS — R94.4 DECREASED GFR: Primary | ICD-10-CM

## 2018-04-23 LAB
ANION GAP SERPL CALCULATED.3IONS-SCNC: 8 MMOL/L (ref 3–14)
BUN SERPL-MCNC: 44 MG/DL (ref 7–30)
CALCIUM SERPL-MCNC: 9.2 MG/DL (ref 8.5–10.1)
CHLORIDE SERPL-SCNC: 106 MMOL/L (ref 94–109)
CO2 SERPL-SCNC: 25 MMOL/L (ref 20–32)
CREAT SERPL-MCNC: 1.24 MG/DL (ref 0.66–1.25)
GFR SERPL CREATININE-BSD FRML MDRD: 58 ML/MIN/1.7M2
GLUCOSE SERPL-MCNC: 100 MG/DL (ref 70–99)
POTASSIUM SERPL-SCNC: 4.4 MMOL/L (ref 3.4–5.3)
SODIUM SERPL-SCNC: 139 MMOL/L (ref 133–144)

## 2018-04-23 PROCEDURE — G0463 HOSPITAL OUTPT CLINIC VISIT: HCPCS

## 2018-04-23 PROCEDURE — 99213 OFFICE O/P EST LOW 20 MIN: CPT | Performed by: FAMILY MEDICINE

## 2018-04-23 PROCEDURE — 36415 COLL VENOUS BLD VENIPUNCTURE: CPT | Mod: ZL | Performed by: FAMILY MEDICINE

## 2018-04-23 PROCEDURE — 80048 BASIC METABOLIC PNL TOTAL CA: CPT | Mod: ZL | Performed by: FAMILY MEDICINE

## 2018-04-23 RX ORDER — LISINOPRIL 20 MG/1
20 TABLET ORAL DAILY
Qty: 90 TABLET | Refills: 1 | Status: SHIPPED | OUTPATIENT
Start: 2018-04-23 | End: 2018-10-29

## 2018-04-23 RX ORDER — SERTRALINE HYDROCHLORIDE 25 MG/1
25 TABLET, FILM COATED ORAL DAILY
Qty: 90 TABLET | Refills: 1 | Status: SHIPPED | OUTPATIENT
Start: 2018-04-23 | End: 2018-10-29

## 2018-04-23 RX ORDER — CHLORTHALIDONE 25 MG/1
12.5 TABLET ORAL DAILY
Qty: 30 TABLET | Refills: 0 | Status: SHIPPED | OUTPATIENT
Start: 2018-04-23 | End: 2018-07-16

## 2018-04-23 ASSESSMENT — ANXIETY QUESTIONNAIRES
IF YOU CHECKED OFF ANY PROBLEMS ON THIS QUESTIONNAIRE, HOW DIFFICULT HAVE THESE PROBLEMS MADE IT FOR YOU TO DO YOUR WORK, TAKE CARE OF THINGS AT HOME, OR GET ALONG WITH OTHER PEOPLE: SOMEWHAT DIFFICULT
6. BECOMING EASILY ANNOYED OR IRRITABLE: SEVERAL DAYS
7. FEELING AFRAID AS IF SOMETHING AWFUL MIGHT HAPPEN: NOT AT ALL
4. TROUBLE RELAXING: NOT AT ALL
GAD7 TOTAL SCORE: 2
1. FEELING NERVOUS, ANXIOUS, OR ON EDGE: SEVERAL DAYS
5. BEING SO RESTLESS THAT IT IS HARD TO SIT STILL: NOT AT ALL
2. NOT BEING ABLE TO STOP OR CONTROL WORRYING: NOT AT ALL
3. WORRYING TOO MUCH ABOUT DIFFERENT THINGS: NOT AT ALL

## 2018-04-23 ASSESSMENT — PAIN SCALES - GENERAL: PAINLEVEL: NO PAIN (0)

## 2018-04-23 NOTE — NURSING NOTE
"Chief Complaint   Patient presents with     Hypertension       Initial /74 (BP Location: Left arm, Patient Position: Chair, Cuff Size: Adult Regular)  Pulse 57  Temp 97.8  F (36.6  C) (Tympanic)  Resp 16  Ht 5' 8\" (1.727 m)  Wt 197 lb (89.4 kg)  SpO2 98%  BMI 29.95 kg/m2 Estimated body mass index is 29.95 kg/(m^2) as calculated from the following:    Height as of this encounter: 5' 8\" (1.727 m).    Weight as of this encounter: 197 lb (89.4 kg).  Medication Reconciliation: complete     Omaira Alcantar   "

## 2018-04-23 NOTE — PROGRESS NOTES
SUBJECTIVE:   Oswald Goel is a 67 year old male who presents to clinic today for the following health issues:      Hypertension Follow-up      Outpatient blood pressures are not being checked.    Low Salt Diet: low salt      Amount of exercise or physical activity: 6-7 days/week for an average of 45-60 minutes    Problems taking medications regularly: No    Medication side effects: none    Diet: regular (no restrictions)      [facility]    Oswald Goel, 67 year old, male presents with   Chief Complaint   Patient presents with     Hypertension       PAST MEDICAL HISTORY:  Past Medical History:   Diagnosis Date     Depressive disorder, not elsewhere classified 02/22/2011     Hypertension, Benign 02/22/2011     Hypertrophy (benign) of prostate without urinary o 10/10/2007     Nonallopathic lesion of cervical region, not elsewhere classified 08/27/2008     Pure hypercholesterolemia 12/29/1999     RBBB 02/22/2011       PAST SURGICAL HISTORY:  Past Surgical History:   Procedure Laterality Date     COLONOSCOPY  03-    repeat 10 yrs     COLONOSCOPY  2005     HERNIA REPAIR       left arthroscopy       RELEASE CARPAL TUNNEL      RT     TONSILLECTOMY       TRANSPOSITION ULNAR NERVE (ELBOW) Left 11/19/2015    Procedure: TRANSPOSITION ULNAR NERVE (ELBOW);  Surgeon: Mahesh Capps MD;  Location: [facility]       MEDICATIONS:  Prior to Admission medications    Medication Sig Start Date End Date Taking? Authorizing Provider   amLODIPine (NORVASC) 5 MG tablet Take 1 tablet (5 mg) by mouth daily 3/19/18  Yes ALDEN Lowery MD   Ascorbic Acid (VITAMIN C PO) Daily   Yes Reported, Patient   ASPIRIN 81 mg daily Aspirin, 81 mg aspirin daily     Yes Reported, Patient   chlorthalidone (HYGROTON) 25 MG tablet Take 1 tablet (25 mg) by mouth daily 4/9/18  Yes ALDEN Lowery MD   fish oil-omega-3 fatty acids (FISH OIL) 1000 MG capsule Take 1 g by mouth daily   Yes Reported, Patient   IBUPROFEN PO Take 200 mg  "by mouth every 8 hours as needed for moderate pain   Yes Reported, Patient   lisinopril (PRINIVIL/ZESTRIL) 20 MG tablet Take 1 tablet (20 mg) by mouth daily 3/12/18  Yes ALDEN Lowery MD   Multiple Vitamin (DAILY MULTIVITAMIN PO) Take 1 tablet by Oral route every day with food   Yes Reported, Patient   sertraline (ZOLOFT) 25 MG tablet Take 1 tablet (25 mg) by mouth daily 1/25/18  Yes ALDEN Lowery MD   simvastatin (ZOCOR) 20 MG tablet TAKE 1 TABLET BY MOUTH ONCE DAILY. 4/17/18  Yes Isidro Chavez MD       ALLERGIES:   No Known Allergies    ROS:  Constitutional, HEENT, cardiovascular, pulmonary, gi and gu systems are negative, except as otherwise noted.      EXAM:  /74 (BP Location: Left arm, Patient Position: Chair, Cuff Size: Adult Regular)  Pulse 57  Temp 97.8  F (36.6  C) (Tympanic)  Resp 16  Ht 5' 8\" (1.727 m)  Wt 197 lb (89.4 kg)  SpO2 98%  BMI 29.95 kg/m2 Body mass index is 29.95 kg/(m^2).   GENERAL APPEARANCE: healthy, alert and no distress  EYES: Eyes grossly normal to inspection, PERRL and conjunctivae and sclerae normal  RESP: lungs clear to auscultation - no rales, rhonchi or wheezes  CV: regular rates and rhythm, normal S1 S2, no S3 or S4 and no murmur, click or rub  NEURO: Normal strength and tone, mentation intact and speech normal  Lab/ X-ray  Pending    SILVIA-7 SCORE 3/19/2018 4/9/2018 4/23/2018   Total Score 2 5 2     PHQ-9 SCORE 3/19/2018 4/9/2018 4/23/2018   Total Score - - -   Total Score 4 7 5       ASSESSMENT/PLAN:    ICD-10-CM    1. Essential hypertension, benign I10 Basic metabolic panel     Blood pressure under much better control with the addition of the chlorthalidone.  Keep with that plus the Prinivil and the Norvasc.  Will call him with BMP results    ADRI Lowery MD  April 23, 2018          "

## 2018-04-23 NOTE — TELEPHONE ENCOUNTER
Patient notified of lab results, will decrease medication and recheck BMP in 2 weeks. Patient also requesting # 90 on other medications orders pending   LEILA DE OLIVEIRA

## 2018-04-23 NOTE — MR AVS SNAPSHOT
"              After Visit Summary   2018    Oswald Goel    MRN: [id number]           Patient Information     Date Of Birth          1950        Visit Information        Provider Department      2018 10:15 AM [facility]        Today's Diagnoses     Essential hypertension, benign    -  1      Care Instructions    We will call with the labs.            Follow-ups after your visit        Who to contact     If you have questions or need follow up information about today's clinic visit or your schedule please contact [facility] directly at [phone].  Normal or non-critical lab and imaging results will be communicated to you by [employer] Innovationshart, letter or phone within 4 business days after the clinic has received the results. If you do not hear from us within 7 days, please contact the clinic through [employer] Innov[employer]hart or phone. If you have a critical or abnormal lab result, we will notify you by phone as soon as possible.  Submit refill requests through [employer] or call your pharmacy and they will forward the refill request to us. Please allow 3 business days for your refill to be completed.          Additional Information About Your Visit        MyChart Information     [employer] lets you send messages to your doctor, view your test results, renew your prescriptions, schedule appointments and more. To sign up, go to [email or web address] . Click on \"Log in\" on the left side of the screen, which will take you to the Welcome page. Then click on \"Sign up Now\" on the right side of the page.     You will be asked to enter the access code listed below, as well as some personal information. Please follow the directions to create your username and password.     Your access code is: [email or web address]  Expires: 2018 10:59 AM     Your access code will  in 90 days. If you need help or a new code, please call your [facility] or [phone].        Care EveryWhere ID     This " "is your Care EveryWhere ID. This could be used by other organizations to access your [place] medical records  [id number]        Your Vitals Were     Pulse Temperature Respirations Height Pulse Oximetry BMI (Body Mass Index)    57 97.8  F (36.6  C) (Tympanic) 16 5' 8\" (1.727 m) 98% 29.95 kg/m2       Blood Pressure from Last 3 Encounters:   04/23/18 136/74   04/09/18 162/70   03/19/18 150/80    Weight from Last 3 Encounters:   04/23/18 197 lb (89.4 kg)   04/09/18 201 lb (91.2 kg)   03/19/18 205 lb 6 oz (93.2 kg)              We Performed the Following     Basic metabolic panel        Primary Care Provider Office Phone # Fax #    R Raul Lowery [phone]       [address] [place] [address]        Equal Access to Services     [facility] : Hadii omer ku hadasho Soomaali, waaxda luqadaha, qaybta kaalmada adeegyada, waxay ronin theodore monahan . So [facility] [phone].    ATENCIÓN: Si habla español, tiene a boothe disposición servicios gratuitos de asistencia lingüística. Jahaira al [phone].    We comply with applicable federal civil rights laws and [place] laws. We do not discriminate on the basis of race, color, national origin, age, disability, sex, sexual orientation, or gender identity.            Thank you!     Thank you for choosing [facility]  for your care. Our goal is always to provide you with excellent care. Hearing back from our patients is one way we can continue to improve our services. Please take a few minutes to complete the written survey that you may receive in the mail after your visit with us. Thank you!             Your Updated Medication List - Protect others around you: Learn how to safely use, store and throw away your medicines at www.disposemymeds.org.          This list is accurate as of 4/23/18 10:21 AM.  Always use your most recent med list.                   Brand Name Dispense Instructions for use Diagnosis    amLODIPine 5 MG tablet "    NORVASC    30 tablet    Take 1 tablet (5 mg) by mouth daily    Essential hypertension       ASPIRIN      81 mg daily Aspirin, 81 mg aspirin daily        chlorthalidone 25 MG tablet    HYGROTON    30 tablet    Take 1 tablet (25 mg) by mouth daily    Essential hypertension, benign       DAILY MULTIVITAMIN PO      Take 1 tablet by Oral route every day with food        fish oil-omega-3 fatty acids 1000 MG capsule      Take 1 g by mouth daily        IBUPROFEN PO      Take 200 mg by mouth every 8 hours as needed for moderate pain        lisinopril 20 MG tablet    PRINIVIL/ZESTRIL    30 tablet    Take 1 tablet (20 mg) by mouth daily    Essential hypertension       sertraline 25 MG tablet    ZOLOFT    30 tablet    Take 1 tablet (25 mg) by mouth daily    Dysthymic disorder       simvastatin 20 MG tablet    ZOCOR    90 tablet    TAKE 1 TABLET BY MOUTH ONCE DAILY.    Pure hypercholesterolemia       VITAMIN C PO      Daily

## 2018-04-24 ASSESSMENT — ANXIETY QUESTIONNAIRES: GAD7 TOTAL SCORE: 2

## 2018-04-24 ASSESSMENT — PATIENT HEALTH QUESTIONNAIRE - PHQ9: SUM OF ALL RESPONSES TO PHQ QUESTIONS 1-9: 5

## 2018-05-07 DIAGNOSIS — R94.4 DECREASED GFR: ICD-10-CM

## 2018-05-07 LAB
ANION GAP SERPL CALCULATED.3IONS-SCNC: 7 MMOL/L (ref 3–14)
BUN SERPL-MCNC: 23 MG/DL (ref 7–30)
CALCIUM SERPL-MCNC: 9 MG/DL (ref 8.5–10.1)
CHLORIDE SERPL-SCNC: 104 MMOL/L (ref 94–109)
CO2 SERPL-SCNC: 26 MMOL/L (ref 20–32)
CREAT SERPL-MCNC: 0.98 MG/DL (ref 0.66–1.25)
GFR SERPL CREATININE-BSD FRML MDRD: 75 ML/MIN/1.7M2
GLUCOSE SERPL-MCNC: 106 MG/DL (ref 70–99)
POTASSIUM SERPL-SCNC: 4 MMOL/L (ref 3.4–5.3)
SODIUM SERPL-SCNC: 137 MMOL/L (ref 133–144)

## 2018-05-07 PROCEDURE — 36415 COLL VENOUS BLD VENIPUNCTURE: CPT | Mod: ZL | Performed by: FAMILY MEDICINE

## 2018-05-07 PROCEDURE — 80048 BASIC METABOLIC PNL TOTAL CA: CPT | Mod: ZL | Performed by: FAMILY MEDICINE

## 2018-09-17 DIAGNOSIS — I10 ESSENTIAL HYPERTENSION: ICD-10-CM

## 2018-09-17 NOTE — TELEPHONE ENCOUNTER
AMLODIPINE BESYLATE 5 MG TAb      Last Written Prescription Date:  5  Last Fill Quantity: 3/19/18,   # refills: 30  Last Office Visit: 4/23/18  Future Office visit:

## 2018-09-18 RX ORDER — AMLODIPINE BESYLATE 5 MG/1
TABLET ORAL
Qty: 30 TABLET | Refills: 5 | Status: SHIPPED | OUTPATIENT
Start: 2018-09-18 | End: 2018-10-29

## 2018-09-25 DIAGNOSIS — I10 ESSENTIAL HYPERTENSION, BENIGN: ICD-10-CM

## 2018-09-25 NOTE — TELEPHONE ENCOUNTER
chlorthalidone (HYGROTON) 25 MG tablet  Last Written Prescription Date:  7/19/18  Last Fill Quantity: 30,   # refills: 0  Last Office Visit: 4/23/18  Future Office visit:    Next 5 appointments (look out 90 days)     Oct 29, 2018  9:15 AM CDT   (Arrive by 9:00 AM)   Office Visit with ALDEN Lowery MD   [facility] ([facility] )    [address] [place] [address]   [phone]

## 2018-09-26 RX ORDER — CHLORTHALIDONE 25 MG/1
25 TABLET ORAL DAILY
Qty: 30 TABLET | Refills: 3 | Status: SHIPPED | OUTPATIENT
Start: 2018-09-26 | End: 2018-10-29

## 2018-09-26 NOTE — TELEPHONE ENCOUNTER
Hygroton        Routing refill request to provider for review/approval because:  Pharmacy is requesting 25mg  Si tablet daily.  Epic notes the patient takes 25mg  Si/2 tablet daily.  Thank you, Kristen for addressing this refill for Dr. Raul Lowery who is out of the office today.

## 2018-10-21 NOTE — PROGRESS NOTES
Outpatient Physical Therapy Discharge Note     Patient: Oswald Goel  : 1950    Beginning/End Dates of Reporting Period:  10/18/17 to 2017    Referring Provider: Dr. Brooke    Therapy Diagnosis: acute R sided LBP without sciatica     Client Self Report: Reports that he is doing very well. Has gone skating and is getting back to his prior activity level. Thinks he is at or very near where he was prior to the fall.       Goals:  Goal Identifier STG 1   Goal Description Patient will be independent and compliant with HEP.    Target Date 17   Date Met      Progress:     Goal Identifier LTG 1   Goal Description Patient will be able to perform sit to stand transfers consistently without limitation from LBP.    Target Date 17   Date Met      Progress:     Goal Identifier LTG 2   Goal Description Patient will be able to return to prior activity level including bending, lifting, twisting and recreaional exercises without limitation from LBP symptoms.    Target Date 18   Date Met      Progress:     Goal Identifier LTG 3   Goal Description Patient will be able to consistently sleep without limitation from back pain symptoms.    Target Date 18   Date Met      Progress:     Goal Identifier     Goal Description     Target Date     Date Met      Progress:     Goal Identifier     Goal Description     Target Date     Date Met      Progress:     Goal Identifier     Goal Description     Target Date     Date Met      Progress:     Goal Identifier     Goal Description     Target Date     Date Met      Progress:     Progress Toward Goals:   Progress this reporting period: patient has met goals. Ready for transition to independent HEP performance.           Plan:  Discharge from therapy.    Discharge:    Reason for Discharge: Patient has met all goals.    Equipment Issued: HEP    Discharge Plan: Patient to continue home program.

## 2018-10-22 NOTE — PROGRESS NOTES
SUBJECTIVE:   Oswald Goel is a 67 year old male who presents to clinic today for the following health issues:      Hypertension Follow-up      Outpatient blood pressures are not being checked.    Low Salt Diet: low salt      Amount of exercise or physical activity: 4-5 days/week for an average of greater than 60 minutes    Problems taking medications regularly: No    Medication side effects: none    Diet: regular (no restrictions)    He is fasting would like labs done will do labs for hypercholesterolemia also  PSA for his hypertrophy of prostate without urinary obstruction, regarding the blood pressure he has no chest pain or shortness of breath.  Needs refill of his medications would like 1 year supply        Problem list and histories reviewed & adjusted, as indicated.  Additional history: as documented    Patient Active Problem List   Diagnosis     Advanced care planning/counseling discussion     Pure hypercholesterolemia     Hypertrophy of prostate without urinary obstruction     Nonallopathic lesion of cervical region     Dysthymic disorder     Essential hypertension, benign     RBBB     Primary localized osteoarthrosis, pelvic region and thigh     ACP (advance care planning)     Past Surgical History:   Procedure Laterality Date     COLONOSCOPY  03-    repeat 10 yrs     COLONOSCOPY  2005     HERNIA REPAIR       left arthroscopy       RELEASE CARPAL TUNNEL      RT     TONSILLECTOMY       TRANSPOSITION ULNAR NERVE (ELBOW) Left 11/19/2015    Procedure: TRANSPOSITION ULNAR NERVE (ELBOW);  Surgeon: Mahesh Capps MD;  Location: [facility]       Social History   Substance Use Topics     Smoking status: Former Smoker     Types: Cigarettes     Smokeless tobacco: Former User      Comment: no passive exposure, tried to quit-yes     Alcohol use Yes      Comment: 2 glasses, daily, yesterday     Family History   Problem Relation Age of Onset     Cancer Mother      Pancreatic     HEART DISEASE Mother       Heart Surgery         Current Outpatient Prescriptions   Medication Sig Dispense Refill     amLODIPine (NORVASC) 5 MG tablet TAKE 1 TABLET BY MOUTH ONCE DAILY. 90 tablet 3     Ascorbic Acid (VITAMIN C PO) Daily       ASPIRIN 81 mg daily Aspirin, 81 mg aspirin daily         chlorthalidone (HYGROTON) 25 MG tablet TAKE 1/2 TABLET BY MOUTH DAILY. 45 tablet 3     IBUPROFEN PO Take 200 mg by mouth every 8 hours as needed for moderate pain       lisinopril (PRINIVIL/ZESTRIL) 20 MG tablet Take 1 tablet (20 mg) by mouth daily 90 tablet 3     Multiple Vitamin (DAILY MULTIVITAMIN PO) Take 1 tablet by Oral route every day with food       simvastatin (ZOCOR) 20 MG tablet TAKE 1 TABLET BY MOUTH ONCE DAILY. 90 tablet 2     [DISCONTINUED] amLODIPine (NORVASC) 5 MG tablet TAKE 1 TABLET BY MOUTH ONCE DAILY. 30 tablet 5     [DISCONTINUED] chlorthalidone (HYGROTON) 25 MG tablet Take 1 tablet (25 mg) by mouth daily 30 tablet 3     [DISCONTINUED] chlorthalidone (HYGROTON) 25 MG tablet TAKE 1/2 TABLET BY MOUTH DAILY. 30 tablet 0     [DISCONTINUED] lisinopril (PRINIVIL/ZESTRIL) 20 MG tablet Take 1 tablet (20 mg) by mouth daily 90 tablet 1     No Known Allergies  BP Readings from Last 3 Encounters:   10/29/18 130/66   04/23/18 136/74   04/09/18 162/70    Wt Readings from Last 3 Encounters:   10/29/18 206 lb (93.4 kg)   04/23/18 197 lb (89.4 kg)   04/09/18 201 lb (91.2 kg)                    Reviewed and updated as needed this visit by clinical staff       Reviewed and updated as needed this visit by Provider         ROS:  Constitutional, HEENT, cardiovascular, pulmonary, GI, , musculoskeletal, neuro, skin, endocrine and psych systems are negative, except as otherwise noted.    OBJECTIVE:     /66  Pulse 59  Temp 96.8  F (36  C) (Tympanic)  Wt 206 lb (93.4 kg)  SpO2 95%  BMI 31.32 kg/m2  Body mass index is 31.32 kg/(m^2).  GENERAL: healthy, alert and no distress  EYES: Eyes grossly normal to inspection, PERRL and conjunctivae and  sclerae normal  NECK: no adenopathy, no asymmetry, masses, or scars and thyroid normal to palpation  RESP: lungs clear to auscultation - no rales, rhonchi or wheezes  CV: regular rate and rhythm, normal S1 S2, no S3 or S4, no murmur, click or rub, no peripheral edema and peripheral pulses strong  ABDOMEN: soft, nontender, no hepatosplenomegaly, no masses and bowel sounds normal  MS: no gross musculoskeletal defects noted, no edema    Diagnostic Test Results:  Pending  ASSESSMENT/PLAN:               ICD-10-CM    1. Essential hypertension, benign I10 Basic metabolic panel     CBC with platelets differential     lisinopril (PRINIVIL/ZESTRIL) 20 MG tablet     amLODIPine (NORVASC) 5 MG tablet     chlorthalidone (HYGROTON) 25 MG tablet   2. Need for prophylactic vaccination and inoculation against influenza Z23 HC FLU VACCINE, INCREASED ANTIGEN, PRESV FREE     ADMIN INFLUENZA (For MEDICARE Patients ONLY) []   3. Pure hypercholesterolemia E78.00 Lipid Profile   4. Hypertrophy of prostate without urinary obstruction N40.0 Prostate spec antigen screen       For the hypertension check BMP CBC Prinivil Norvasc and Hygroton were all refilled.  Flu shot given today.  Will check fasting lipids for his history of hypercholesterolemia and he would like to have his PSA done.  Is up-to-date with colonoscopy.    ALDEN Lowery MD  [facility]

## 2018-10-29 ENCOUNTER — OFFICE VISIT (OUTPATIENT)
Dept: FAMILY MEDICINE | Facility: OTHER | Age: 68
End: 2018-10-29
Attending: FAMILY MEDICINE
Payer: COMMERCIAL

## 2018-10-29 VITALS
TEMPERATURE: 96.8 F | BODY MASS INDEX: 31.32 KG/M2 | OXYGEN SATURATION: 95 % | WEIGHT: 206 LBS | HEART RATE: 59 BPM | DIASTOLIC BLOOD PRESSURE: 66 MMHG | SYSTOLIC BLOOD PRESSURE: 130 MMHG

## 2018-10-29 DIAGNOSIS — Z23 NEED FOR PROPHYLACTIC VACCINATION AND INOCULATION AGAINST INFLUENZA: ICD-10-CM

## 2018-10-29 DIAGNOSIS — I10 ESSENTIAL HYPERTENSION, BENIGN: Primary | ICD-10-CM

## 2018-10-29 DIAGNOSIS — N40.0 HYPERTROPHY OF PROSTATE WITHOUT URINARY OBSTRUCTION: ICD-10-CM

## 2018-10-29 DIAGNOSIS — E78.00 PURE HYPERCHOLESTEROLEMIA: ICD-10-CM

## 2018-10-29 LAB
ANION GAP SERPL CALCULATED.3IONS-SCNC: 5 MMOL/L (ref 3–14)
BASOPHILS # BLD AUTO: 0 10E9/L (ref 0–0.2)
BASOPHILS NFR BLD AUTO: 0.3 %
BUN SERPL-MCNC: 14 MG/DL (ref 7–30)
CALCIUM SERPL-MCNC: 8.8 MG/DL (ref 8.5–10.1)
CHLORIDE SERPL-SCNC: 103 MMOL/L (ref 94–109)
CHOLEST SERPL-MCNC: 126 MG/DL
CO2 SERPL-SCNC: 27 MMOL/L (ref 20–32)
CREAT SERPL-MCNC: 1.02 MG/DL (ref 0.66–1.25)
DIFFERENTIAL METHOD BLD: ABNORMAL
EOSINOPHIL # BLD AUTO: 0.1 10E9/L (ref 0–0.7)
EOSINOPHIL NFR BLD AUTO: 1.4 %
ERYTHROCYTE [DISTWIDTH] IN BLOOD BY AUTOMATED COUNT: 12.7 % (ref 10–15)
GFR SERPL CREATININE-BSD FRML MDRD: 72 ML/MIN/1.7M2
GLUCOSE SERPL-MCNC: 97 MG/DL (ref 70–99)
HCT VFR BLD AUTO: 35.4 % (ref 40–53)
HDLC SERPL-MCNC: 51 MG/DL
HGB BLD-MCNC: 11.8 G/DL (ref 13.3–17.7)
IMM GRANULOCYTES # BLD: 0 10E9/L (ref 0–0.4)
IMM GRANULOCYTES NFR BLD: 0.3 %
LDLC SERPL CALC-MCNC: 54 MG/DL
LYMPHOCYTES # BLD AUTO: 1 10E9/L (ref 0.8–5.3)
LYMPHOCYTES NFR BLD AUTO: 15.1 %
MCH RBC QN AUTO: 31.3 PG (ref 26.5–33)
MCHC RBC AUTO-ENTMCNC: 33.3 G/DL (ref 31.5–36.5)
MCV RBC AUTO: 94 FL (ref 78–100)
MONOCYTES # BLD AUTO: 0.6 10E9/L (ref 0–1.3)
MONOCYTES NFR BLD AUTO: 9.2 %
NEUTROPHILS # BLD AUTO: 4.8 10E9/L (ref 1.6–8.3)
NEUTROPHILS NFR BLD AUTO: 73.7 %
NONHDLC SERPL-MCNC: 75 MG/DL
NRBC # BLD AUTO: 0 10*3/UL
NRBC BLD AUTO-RTO: 0 /100
PLATELET # BLD AUTO: 256 10E9/L (ref 150–450)
POTASSIUM SERPL-SCNC: 3.9 MMOL/L (ref 3.4–5.3)
PSA SERPL-ACNC: 0.23 UG/L (ref 0–4)
RBC # BLD AUTO: 3.77 10E12/L (ref 4.4–5.9)
SODIUM SERPL-SCNC: 135 MMOL/L (ref 133–144)
TRIGL SERPL-MCNC: 105 MG/DL
WBC # BLD AUTO: 6.6 10E9/L (ref 4–11)

## 2018-10-29 PROCEDURE — 99214 OFFICE O/P EST MOD 30 MIN: CPT | Performed by: FAMILY MEDICINE

## 2018-10-29 PROCEDURE — 80048 BASIC METABOLIC PNL TOTAL CA: CPT | Mod: ZL | Performed by: FAMILY MEDICINE

## 2018-10-29 PROCEDURE — 36415 COLL VENOUS BLD VENIPUNCTURE: CPT | Mod: ZL | Performed by: FAMILY MEDICINE

## 2018-10-29 PROCEDURE — 90662 IIV NO PRSV INCREASED AG IM: CPT | Performed by: FAMILY MEDICINE

## 2018-10-29 PROCEDURE — G0008 ADMIN INFLUENZA VIRUS VAC: HCPCS | Performed by: FAMILY MEDICINE

## 2018-10-29 PROCEDURE — G0463 HOSPITAL OUTPT CLINIC VISIT: HCPCS | Mod: 25

## 2018-10-29 PROCEDURE — 84153 ASSAY OF PSA TOTAL: CPT | Performed by: FAMILY MEDICINE

## 2018-10-29 PROCEDURE — 85025 COMPLETE CBC W/AUTO DIFF WBC: CPT | Mod: ZL | Performed by: FAMILY MEDICINE

## 2018-10-29 PROCEDURE — 80061 LIPID PANEL: CPT | Mod: ZL | Performed by: FAMILY MEDICINE

## 2018-10-29 PROCEDURE — G0103 PSA SCREENING: HCPCS | Mod: ZL | Performed by: FAMILY MEDICINE

## 2018-10-29 RX ORDER — CHLORTHALIDONE 25 MG/1
TABLET ORAL
Qty: 45 TABLET | Refills: 3 | Status: SHIPPED | OUTPATIENT
Start: 2018-10-29 | End: 2019-05-16 | Stop reason: ALTCHOICE

## 2018-10-29 RX ORDER — LISINOPRIL 20 MG/1
20 TABLET ORAL DAILY
Qty: 90 TABLET | Refills: 3 | Status: SHIPPED | OUTPATIENT
Start: 2018-10-29 | End: 2019-11-11

## 2018-10-29 RX ORDER — AMLODIPINE BESYLATE 5 MG/1
TABLET ORAL
Qty: 90 TABLET | Refills: 3 | Status: SHIPPED | OUTPATIENT
Start: 2018-10-29 | End: 2019-05-16

## 2018-10-29 ASSESSMENT — PAIN SCALES - GENERAL: PAINLEVEL: NO PAIN (0)

## 2018-10-29 NOTE — MR AVS SNAPSHOT
"              After Visit Summary   10/29/2018    Oswald Goel    MRN: [id number]           Patient Information     Date Of Birth          1950        Visit Information        Provider Department      10/29/2018 9:30 AM ALDEN Lowery MD [facility]        Today's Diagnoses     Essential hypertension, benign    -  1    Need for prophylactic vaccination and inoculation against influenza        Pure hypercholesterolemia        Hypertrophy of prostate without urinary obstruction          Care Instructions    We will call with the labs.            Follow-ups after your visit        Who to contact     If you have questions or need follow up information about today's clinic visit or your schedule please contact [facility] directly at [phone].  Normal or non-critical lab and imaging results will be communicated to you by MyChart, letter or phone within 4 business days after the clinic has received the results. If you do not hear from us within 7 days, please contact the clinic through MyChart or phone. If you have a critical or abnormal lab result, we will notify you by phone as soon as possible.  Submit refill requests through [employer] or call your pharmacy and they will forward the refill request to us. Please allow 3 business days for your refill to be completed.          Additional Information About Your Visit        MyChart Information     [employer] lets you send messages to your doctor, view your test results, renew your prescriptions, schedule appointments and more. To sign up, go to [email or web address] . Click on \"Log in\" on the left side of the screen, which will take you to the Welcome page. Then click on \"Sign up Now\" on the right side of the page.     You will be asked to enter the access code listed below, as well as some personal information. Please follow the directions to create your username and password.     Your access code is: " [email or web address]  Expires: 2019  9:35 AM     Your access code will  in 90 days. If you need help or a new code, please call your [place] clinic or [phone].        Care EveryWhere ID     This is your Care EveryWhere ID. This could be used by other organizations to access your [facility] medical records  [id number]        Your Vitals Were     Pulse Temperature Pulse Oximetry BMI (Body Mass Index)          59 96.8  F (36  C) (Tympanic) 95% 31.32 kg/m2         Blood Pressure from Last 3 Encounters:   10/29/18 130/66   18 136/74   18 162/70    Weight from Last 3 Encounters:   10/29/18 206 lb (93.4 kg)   18 197 lb (89.4 kg)   18 201 lb (91.2 kg)              We Performed the Following     ADMIN INFLUENZA (For MEDICARE Patients ONLY) []     Basic metabolic panel     CBC with platelets differential     HC FLU VACCINE, INCREASED ANTIGEN, PRESV FREE     Lipid Profile     Prostate spec antigen screen          Where to get your medicines      These medications were sent to [facility] [place] [id number]     Phone:  [phone]     amLODIPine 5 MG tablet    chlorthalidone 25 MG tablet    lisinopril 20 MG tablet          Primary Care Provider Office Phone # Fax #    R Raul Lowery [phone]       [address] [place] [address]        Equal Access to Services     RAJINDER CALIXTO AH: Hadii aad ku hadasho Soomaali, waaxda luqadaha, qaybta kaalmada adeegyada, wilfredo monahan . So [facility] [phone].    ATENCIÓN: Si habla español, tiene a boothe disposición servicios gratuitos de asistencia lingüística. Llame al [phone].    We comply with applicable federal civil rights laws and [place] laws. We do not discriminate on the basis of race, color, national origin, age, disability, sex, sexual orientation, or gender identity.            Thank you!     Thank you for choosing  [facility] [place]  for your care. Our goal is always to provide you with excellent care. Hearing back from our patients is one way we can continue to improve our services. Please take a few minutes to complete the written survey that you may receive in the mail after your visit with us. Thank you!             Your Updated Medication List - Protect others around you: Learn how to safely use, store and throw away your medicines at www.disposemymeds.org.          This list is accurate as of 10/29/18  9:35 AM.  Always use your most recent med list.                   Brand Name Dispense Instructions for use Diagnosis    amLODIPine 5 MG tablet    NORVASC    90 tablet    TAKE 1 TABLET BY MOUTH ONCE DAILY.    Essential hypertension, benign       ASPIRIN      81 mg daily Aspirin, 81 mg aspirin daily        chlorthalidone 25 MG tablet    HYGROTON    45 tablet    TAKE 1/2 TABLET BY MOUTH DAILY.    Essential hypertension, benign       DAILY MULTIVITAMIN PO      Take 1 tablet by Oral route every day with food        IBUPROFEN PO      Take 200 mg by mouth every 8 hours as needed for moderate pain        lisinopril 20 MG tablet    PRINIVIL/ZESTRIL    90 tablet    Take 1 tablet (20 mg) by mouth daily    Essential hypertension, benign       simvastatin 20 MG tablet    ZOCOR    90 tablet    TAKE 1 TABLET BY MOUTH ONCE DAILY.    Pure hypercholesterolemia       VITAMIN C PO      Daily

## 2018-10-29 NOTE — PROGRESS NOTES

## 2018-10-29 NOTE — NURSING NOTE
"Chief Complaint   Patient presents with     Hypertension       Initial /68  Pulse 59  Temp 96.8  F (36  C) (Tympanic)  Wt 206 lb (93.4 kg)  SpO2 95%  BMI 31.32 kg/m2 Estimated body mass index is 31.32 kg/(m^2) as calculated from the following:    Height as of 4/23/18: 5' 8\" (1.727 m).    Weight as of this encounter: 206 lb (93.4 kg).  Medication Reconciliation: complete    Aleta Weeks MA    "

## 2019-01-16 DIAGNOSIS — E78.00 PURE HYPERCHOLESTEROLEMIA: ICD-10-CM

## 2019-01-16 RX ORDER — SIMVASTATIN 20 MG
TABLET ORAL
Qty: 90 TABLET | Refills: 1 | Status: SHIPPED | OUTPATIENT
Start: 2019-01-16 | End: 2019-07-19

## 2019-03-11 NOTE — PROGRESS NOTES
SUBJECTIVE:   CC: Oswald Goel is an 68 year old male who presents for preventive health visit.     Healthy Habits:    Do you get at least three servings of calcium containing foods daily (dairy, green leafy vegetables, etc.)? no, taking calcium and/or vitamin D supplement: yes - multivitamin    Amount of exercise or daily activities, outside of work: 1 hour(s) per day    Problems taking medications regularly No    Medication side effects: No    Have you had an eye exam in the past two years? yes    Do you see a dentist twice per year? yes    Do you have sleep apnea, excessive snoring or daytime drowsiness?no      He is up-to-date with colonoscopy.  Had a PSA that was normal October 2018.  Does have a sore toe left foot second toe no history of trauma.  There is no break in the skin is just tender just at the tip of the toe.  He has a family history of gout will check a uric acid.  Does have some flexion contractures in his toes    Today's PHQ-2 Score:   PHQ-2 ( 1999 Pfizer) 3/15/2019   Q1: Little interest or pleasure in doing things 0   Q2: Feeling down, depressed or hopeless 0   PHQ-2 Score 0       Abuse: Current or Past(Physical, Sexual or Emotional)- No  Do you feel safe in your environment? Yes    Social History     Tobacco Use     Smoking status: Former Smoker     Types: Cigarettes     Smokeless tobacco: Former User     Tobacco comment: no passive exposure, tried to quit-yes   Substance Use Topics     Alcohol use: Yes     Comment: 2 glasses, daily, yesterday     If you drink alcohol do you typically have >3 drinks per day or >7 drinks per week? No                      Last PSA:   PSA   Date Value Ref Range Status   10/29/2018 0.23 0 - 4 ug/L Final     Comment:     Assay Method:  Chemiluminescence using Siemens Vista analyzer       Reviewed orders with patient. Reviewed health maintenance and updated orders accordingly - Yes  BP Readings from Last 3 Encounters:   03/15/19 130/70   10/29/18 130/66    04/23/18 136/74    Wt Readings from Last 3 Encounters:   03/15/19 88.5 kg (195 lb)   10/29/18 93.4 kg (206 lb)   04/23/18 89.4 kg (197 lb)                  Patient Active Problem List   Diagnosis     Advanced care planning/counseling discussion     Pure hypercholesterolemia     Hypertrophy of prostate without urinary obstruction     Nonallopathic lesion of cervical region     Dysthymic disorder     Essential hypertension, benign     RBBB     Primary localized osteoarthrosis, pelvic region and thigh     ACP (advance care planning)     Past Surgical History:   Procedure Laterality Date     COLONOSCOPY  03-    repeat 10 yrs     COLONOSCOPY  2005     HERNIA REPAIR       left arthroscopy       RELEASE CARPAL TUNNEL      RT     TONSILLECTOMY       TRANSPOSITION ULNAR NERVE (ELBOW) Left 11/19/2015    Procedure: TRANSPOSITION ULNAR NERVE (ELBOW);  Surgeon: Mahesh Capps MD;  Location: [facility]       Social History     Tobacco Use     Smoking status: Former Smoker     Types: Cigarettes     Smokeless tobacco: Former User     Tobacco comment: no passive exposure, tried to quit-yes   Substance Use Topics     Alcohol use: Yes     Comment: 2 glasses, daily, yesterday     Family History   Problem Relation Age of Onset     Cancer Mother         Pancreatic     Heart Disease Mother         Heart Surgery         Current Outpatient Medications   Medication Sig Dispense Refill     amLODIPine (NORVASC) 5 MG tablet TAKE 1 TABLET BY MOUTH ONCE DAILY. 90 tablet 3     Ascorbic Acid (VITAMIN C PO) Daily       ASPIRIN 81 mg daily Aspirin, 81 mg aspirin daily         chlorthalidone (HYGROTON) 25 MG tablet TAKE 1/2 TABLET BY MOUTH DAILY. 45 tablet 3     Ferrous Gluconate 240 (27 Fe) MG TABS Take 1 mg by mouth daily       fish oil-omega-3 fatty acids 1000 MG capsule Take 1 g by mouth daily       IBUPROFEN PO Take 200 mg by mouth every 8 hours as needed for moderate pain       lisinopril (PRINIVIL/ZESTRIL) 20 MG tablet Take 1 tablet  "(20 mg) by mouth daily 90 tablet 3     Multiple Vitamin (DAILY MULTIVITAMIN PO) Take 1 tablet by Oral route every day with food       simvastatin (ZOCOR) 20 MG tablet TAKE 1 TABLET BY MOUTH ONCE DAILY. 90 tablet 1     Allergies   Allergen Reactions     Animal Dander      Deer hair       Reviewed and updated as needed this visit by clinical staff  Tobacco  Allergies  Meds  Med Hx  Surg Hx  Fam Hx  Soc Hx        Reviewed and updated as needed this visit by Provider            ROS:  CONSTITUTIONAL: NEGATIVE for fever, chills, change in weight  INTEGUMENTARY/SKIN: NEGATIVE for worrisome rashes, moles or lesions  EYES: NEGATIVE for vision changes or irritation  ENT: NEGATIVE for ear, mouth and throat problems  RESP: NEGATIVE for significant cough or SOB  CV: NEGATIVE for chest pain, palpitations or peripheral edema  GI: NEGATIVE for nausea, abdominal pain, heartburn, or change in bowel habits   male: negative for dysuria, hematuria, decreased urinary stream, erectile dysfunction, urethral discharge  MUSCULOSKELETAL: NEGATIVE for significant arthralgias or myalgia  NEURO: NEGATIVE for weakness, dizziness or paresthesias  PSYCHIATRIC: NEGATIVE for changes in mood or affect    OBJECTIVE:   /70 (BP Location: Left arm, Patient Position: Sitting, Cuff Size: Adult Regular)   Pulse 50   Temp 95  F (35  C) (Tympanic)   Ht 1.676 m (5' 6\")   Wt 88.5 kg (195 lb)   SpO2 95%   BMI 31.47 kg/m    EXAM:  GENERAL: healthy, alert and no distress  EYES: Eyes grossly normal to inspection, PERRL and conjunctivae and sclerae normal  HENT: ear canals and TM's normal, nose and mouth without ulcers or lesions  NECK: no adenopathy, no asymmetry, masses, or scars and thyroid normal to palpation  RESP: lungs clear to auscultation - no rales, rhonchi or wheezes  CV: regular rate and rhythm, normal S1 S2, no S3 or S4, no murmur, click or rub, no peripheral edema and peripheral pulses strong  ABDOMEN: soft, nontender, no " hepatosplenomegaly, no masses and bowel sounds normal   (male): normal male genitalia without lesions or urethral discharge, no hernia  RECTAL: normal sphincter tone, no rectal masses, prostate normal size, smooth, nontender without nodules or masses  MS: no gross musculoskeletal defects noted, no edema, left foot second toe is tender at the tip no sign of infection he does have a little flexor contraction in a couple of his toes no obvious sign of infection there could be a start of a little bit of a callus there.  He is not interested in seeing podiatry.  SKIN: no suspicious lesions or rashes  NEURO: Normal strength and tone, mentation intact and speech normal  PSYCH: mentation appears normal, affect normal/bright   foot exam: normal DP and PT pulses, no trophic changes or ulcerative lesions and normal sensory exam    Diagnostic Test Results:  Pending  Exam Information     Exam Date Exam Time Accession # Results    10/29/18  9:39 AM     Component Value Flag Ref Range Units Status Collected Lab   PSA 0.23   0 - 4 ug/L Final 10/29/2018  9:39 AM [facility]         ASSESSMENT/PLAN:       ICD-10-CM    1. Pure hypercholesterolemia E78.00 Lipid Profile     AST   2. Visit for preventive health examination Z00.00    3. Essential hypertension, benign I10 CBC with platelets differential     Basic metabolic panel   4. Hypertrophy of prostate without urinary obstruction N40.0    5. Encounter for hepatitis C screening test for low risk patient Z11.59 Hepatitis C antibody   6. Pain of toe of left foot M79.675 Uric acid   Here for preventative exam overall he is feeling well.  He is up-to-date with colonoscopy.  Has the pain in his foot we will check a uric acid that clinically is low suspicion for gout but he has a family history of gout.  He would like to check for hepatitis C.  Does have a history of prostatic hypertrophy no palpable lesions felt on exam and his recent PSA is 0.23.  Has hypertension is controlled  "check a CBC and a BMP and also check lipid AST for hypercholesterolemia.    COUNSELING:  Reviewed preventive health counseling, as reflected in patient instructions       Regular exercise       Healthy diet/nutrition       Colon cancer screening       Prostate cancer screening    BP Readings from Last 1 Encounters:   03/15/19 130/70     Estimated body mass index is 31.47 kg/m  as calculated from the following:    Height as of this encounter: 1.676 m (5' 6\").    Weight as of this encounter: 88.5 kg (195 lb).           reports that he has quit smoking. His smoking use included cigarettes. He has quit using smokeless tobacco.      Counseling Resources:  ATP IV Guidelines  Pooled Cohorts Equation Calculator  FRAX Risk Assessment  ICSI Preventive Guidelines  Dietary Guidelines for Americans, 2010  USDA's MyPlate  ASA Prophylaxis  Lung CA Screening    R Raul Lowery MD  [facility] [place]  "

## 2019-03-11 NOTE — PATIENT INSTRUCTIONS
Preventive Health Recommendations:     See your health care provider every year to    Review health changes.     Discuss preventive care.      Review your medicines if your doctor has prescribed any.      Talk with your health care provider about whether you should have a test to screen for prostate cancer (PSA).    Every 3 years, have a diabetes test (fasting glucose). If you are at risk for diabetes, you should have this test more often.    Every 5 years, have a cholesterol test. Have this test more often if you are at risk for high cholesterol or heart disease.     Every 10 years, have a colonoscopy. Or, have a yearly FIT test (stool test). These exams will check for colon cancer.    Talk to with your health care provider about screening for Abdominal Aortic Aneurysm if you have a family history of AAA or have a history of smoking.    Shots:     Get a flu shot each year.     Get a tetanus shot every 10 years.     Talk to your doctor about your pneumonia vaccines. There are now two you should receive - Pneumovax (PPSV 23) and Prevnar (PCV 13).     Talk to your pharmacist about a shingles vaccine.     Talk to your doctor about the hepatitis B vaccine.  Nutrition:     Eat at least 5 servings of fruits and vegetables each day.     Eat whole-grain bread, whole-wheat pasta and brown rice instead of white grains and rice.     Get adequate Calcium and Vitamin D.   Lifestyle    Exercise for at least 150 minutes a week (30 minutes a day, 5 days a week). This will help you control your weight and prevent disease.     Limit alcohol to one drink per day.     No smoking.     Wear sunscreen to prevent skin cancer.    See your dentist every six months for an exam and cleaning.    See your eye doctor every 1 to 2 years to screen for conditions such as glaucoma, macular degeneration, cataracts, etc.    Personalized Prevention Plan  You are due for the preventive services outlined below.  Your care team is available to assist you  in scheduling these services.  If you have already completed any of these items, please share that information with your care team to update in your medical record.  Health Maintenance Due   Topic Date Due     Hepatitis C Screening  12/09/1966     Zoster (Shingles) Vaccine (1 of 2) 12/09/1998     Depression Assessment - every 6 months  10/23/2018     Annual Wellness Visit  01/25/2019     Pneumococcal Vaccine (2 of 2 - PPSV23) 01/25/2019

## 2019-03-15 ENCOUNTER — OFFICE VISIT (OUTPATIENT)
Dept: FAMILY MEDICINE | Facility: OTHER | Age: 69
End: 2019-03-15
Attending: FAMILY MEDICINE
Payer: COMMERCIAL

## 2019-03-15 ENCOUNTER — TELEPHONE (OUTPATIENT)
Dept: FAMILY MEDICINE | Facility: OTHER | Age: 69
End: 2019-03-15

## 2019-03-15 VITALS
HEIGHT: 66 IN | OXYGEN SATURATION: 95 % | SYSTOLIC BLOOD PRESSURE: 130 MMHG | BODY MASS INDEX: 31.34 KG/M2 | TEMPERATURE: 95 F | WEIGHT: 195 LBS | DIASTOLIC BLOOD PRESSURE: 70 MMHG | HEART RATE: 50 BPM

## 2019-03-15 DIAGNOSIS — E78.00 PURE HYPERCHOLESTEROLEMIA: Primary | ICD-10-CM

## 2019-03-15 DIAGNOSIS — I10 ESSENTIAL HYPERTENSION, BENIGN: ICD-10-CM

## 2019-03-15 DIAGNOSIS — N40.0 HYPERTROPHY OF PROSTATE WITHOUT URINARY OBSTRUCTION: ICD-10-CM

## 2019-03-15 DIAGNOSIS — Z11.59 ENCOUNTER FOR HEPATITIS C SCREENING TEST FOR LOW RISK PATIENT: ICD-10-CM

## 2019-03-15 DIAGNOSIS — M10.9 GOUT, UNSPECIFIED CAUSE, UNSPECIFIED CHRONICITY, UNSPECIFIED SITE: Primary | ICD-10-CM

## 2019-03-15 DIAGNOSIS — Z00.00 VISIT FOR PREVENTIVE HEALTH EXAMINATION: ICD-10-CM

## 2019-03-15 DIAGNOSIS — M79.675 PAIN OF TOE OF LEFT FOOT: ICD-10-CM

## 2019-03-15 LAB
ANION GAP SERPL CALCULATED.3IONS-SCNC: 5 MMOL/L (ref 3–14)
AST SERPL W P-5'-P-CCNC: 31 U/L (ref 0–45)
BASOPHILS # BLD AUTO: 0 10E9/L (ref 0–0.2)
BASOPHILS NFR BLD AUTO: 0.4 %
BUN SERPL-MCNC: 17 MG/DL (ref 7–30)
CALCIUM SERPL-MCNC: 9.2 MG/DL (ref 8.5–10.1)
CHLORIDE SERPL-SCNC: 101 MMOL/L (ref 94–109)
CHOLEST SERPL-MCNC: 158 MG/DL
CO2 SERPL-SCNC: 27 MMOL/L (ref 20–32)
CREAT SERPL-MCNC: 1.02 MG/DL (ref 0.66–1.25)
DIFFERENTIAL METHOD BLD: ABNORMAL
EOSINOPHIL # BLD AUTO: 0.2 10E9/L (ref 0–0.7)
EOSINOPHIL NFR BLD AUTO: 3.9 %
ERYTHROCYTE [DISTWIDTH] IN BLOOD BY AUTOMATED COUNT: 13.4 % (ref 10–15)
GFR SERPL CREATININE-BSD FRML MDRD: 74 ML/MIN/{1.73_M2}
GLUCOSE SERPL-MCNC: 101 MG/DL (ref 70–99)
HCT VFR BLD AUTO: 34.9 % (ref 40–53)
HDLC SERPL-MCNC: 57 MG/DL
HGB BLD-MCNC: 11.9 G/DL (ref 13.3–17.7)
IMM GRANULOCYTES # BLD: 0 10E9/L (ref 0–0.4)
IMM GRANULOCYTES NFR BLD: 0.7 %
LDLC SERPL CALC-MCNC: 79 MG/DL
LYMPHOCYTES # BLD AUTO: 1 10E9/L (ref 0.8–5.3)
LYMPHOCYTES NFR BLD AUTO: 17.7 %
MCH RBC QN AUTO: 31.3 PG (ref 26.5–33)
MCHC RBC AUTO-ENTMCNC: 34.1 G/DL (ref 31.5–36.5)
MCV RBC AUTO: 92 FL (ref 78–100)
MONOCYTES # BLD AUTO: 0.6 10E9/L (ref 0–1.3)
MONOCYTES NFR BLD AUTO: 10.1 %
NEUTROPHILS # BLD AUTO: 3.6 10E9/L (ref 1.6–8.3)
NEUTROPHILS NFR BLD AUTO: 67.2 %
NONHDLC SERPL-MCNC: 101 MG/DL
NRBC # BLD AUTO: 0 10*3/UL
NRBC BLD AUTO-RTO: 0 /100
PLATELET # BLD AUTO: 296 10E9/L (ref 150–450)
POTASSIUM SERPL-SCNC: 3.8 MMOL/L (ref 3.4–5.3)
RBC # BLD AUTO: 3.8 10E12/L (ref 4.4–5.9)
SODIUM SERPL-SCNC: 133 MMOL/L (ref 133–144)
TRIGL SERPL-MCNC: 110 MG/DL
URATE SERPL-MCNC: 7.9 MG/DL (ref 3.5–7.2)
WBC # BLD AUTO: 5.4 10E9/L (ref 4–11)

## 2019-03-15 PROCEDURE — 86803 HEPATITIS C AB TEST: CPT | Mod: ZL | Performed by: FAMILY MEDICINE

## 2019-03-15 PROCEDURE — 99000 SPECIMEN HANDLING OFFICE-LAB: CPT | Performed by: FAMILY MEDICINE

## 2019-03-15 PROCEDURE — 85025 COMPLETE CBC W/AUTO DIFF WBC: CPT | Mod: ZL | Performed by: FAMILY MEDICINE

## 2019-03-15 PROCEDURE — 84550 ASSAY OF BLOOD/URIC ACID: CPT | Mod: ZL | Performed by: FAMILY MEDICINE

## 2019-03-15 PROCEDURE — 99397 PER PM REEVAL EST PAT 65+ YR: CPT | Performed by: FAMILY MEDICINE

## 2019-03-15 PROCEDURE — 80048 BASIC METABOLIC PNL TOTAL CA: CPT | Mod: ZL | Performed by: FAMILY MEDICINE

## 2019-03-15 PROCEDURE — 80061 LIPID PANEL: CPT | Mod: ZL | Performed by: FAMILY MEDICINE

## 2019-03-15 PROCEDURE — 36415 COLL VENOUS BLD VENIPUNCTURE: CPT | Mod: ZL | Performed by: FAMILY MEDICINE

## 2019-03-15 PROCEDURE — 84450 TRANSFERASE (AST) (SGOT): CPT | Mod: ZL | Performed by: FAMILY MEDICINE

## 2019-03-15 RX ORDER — ALLOPURINOL 100 MG/1
100 TABLET ORAL DAILY
Qty: 30 TABLET | Refills: 5 | Status: SHIPPED | OUTPATIENT
Start: 2019-03-15 | End: 2019-04-15

## 2019-03-15 RX ORDER — QUINIDINE GLUCONATE 324 MG
1 TABLET, EXTENDED RELEASE ORAL DAILY
COMMUNITY
End: 2023-04-10

## 2019-03-15 RX ORDER — INDOMETHACIN 50 MG/1
50 CAPSULE ORAL
Qty: 15 CAPSULE | Refills: 0 | Status: SHIPPED | OUTPATIENT
Start: 2019-03-15 | End: 2019-05-16

## 2019-03-15 RX ORDER — CHLORAL HYDRATE 500 MG
1 CAPSULE ORAL DAILY
COMMUNITY

## 2019-03-15 ASSESSMENT — PAIN SCALES - GENERAL: PAINLEVEL: MILD PAIN (2)

## 2019-03-15 ASSESSMENT — MIFFLIN-ST. JEOR: SCORE: 1587.26

## 2019-03-15 NOTE — TELEPHONE ENCOUNTER
Patient notified of lab results, will come in 1 month for follow up and medication pending for gout.   LEILA DE OLIVEIRA

## 2019-03-15 NOTE — NURSING NOTE
"Chief Complaint   Patient presents with     Physical       Initial /70 (BP Location: Left arm, Patient Position: Sitting, Cuff Size: Adult Regular)   Pulse 50   Temp 95  F (35  C) (Tympanic)   Ht 1.676 m (5' 6\")   Wt 88.5 kg (195 lb)   SpO2 95%   BMI 31.47 kg/m   Estimated body mass index is 31.47 kg/m  as calculated from the following:    Height as of this encounter: 1.676 m (5' 6\").    Weight as of this encounter: 88.5 kg (195 lb).  Medication Reconciliation: complete    Vijaya Cartagena LPN  "

## 2019-03-18 LAB — HCV AB SERPL QL IA: NONREACTIVE

## 2019-04-10 NOTE — PROGRESS NOTES
SUBJECTIVE:   Oswald Goel is a 68 year old male who presents to clinic today for the following   health issues:        Gout       Duration: Follow up     Description (location/character/radiation): symptoms have improved, Left 2nd toe.     Intensity:  mild    Accompanying signs and symptoms: none    History (similar episodes/previous evaluation): yes    Precipitating or alleviating factors: None    Therapies tried and outcome: allopurinol and indocin              PAST MEDICAL HISTORY:  Past Medical History:   Diagnosis Date     Depressive disorder, not elsewhere classified 02/22/2011     Hypertension, Benign 02/22/2011     Hypertrophy (benign) of prostate without urinary o 10/10/2007     Nonallopathic lesion of cervical region, not elsewhere classified 08/27/2008     Pure hypercholesterolemia 12/29/1999     RBBB 02/22/2011       PAST SURGICAL HISTORY:  Past Surgical History:   Procedure Laterality Date     COLONOSCOPY  03-    repeat 10 yrs     COLONOSCOPY  2005     HERNIA REPAIR       left arthroscopy       RELEASE CARPAL TUNNEL      RT     TONSILLECTOMY       TRANSPOSITION ULNAR NERVE (ELBOW) Left 11/19/2015    Procedure: TRANSPOSITION ULNAR NERVE (ELBOW);  Surgeon: Mahesh Capps MD;  Location: [facility]       MEDICATIONS:  Prior to Admission medications    Medication Sig Start Date End Date Taking? Authorizing Provider   allopurinol (ZYLOPRIM) 100 MG tablet Take 2 tablets (200 mg) by mouth daily 4/15/19  Yes ALDEN Lowery MD   amLODIPine (NORVASC) 5 MG tablet TAKE 1 TABLET BY MOUTH ONCE DAILY. 10/29/18  Yes ALDEN Lowery MD   Ascorbic Acid (VITAMIN C PO) Daily   Yes Reported, Patient   ASPIRIN Take 81 mg by mouth daily    Yes Reported, Patient   chlorthalidone (HYGROTON) 25 MG tablet TAKE 1/2 TABLET BY MOUTH DAILY. 10/29/18  Yes ALDEN Lowery MD   Ferrous Gluconate 240 (27 Fe) MG TABS Take 1 mg by mouth daily   Yes Reported, Patient   fish oil-omega-3 fatty acids 1000 MG capsule Take 1 g by  mouth daily   Yes Reported, Patient   lisinopril (PRINIVIL/ZESTRIL) 20 MG tablet Take 1 tablet (20 mg) by mouth daily 10/29/18  Yes ALDEN Lowery MD   Multiple Vitamin (DAILY MULTIVITAMIN PO) Take 1 tablet by Oral route every day with food   Yes Reported, Patient   simvastatin (ZOCOR) 20 MG tablet TAKE 1 TABLET BY MOUTH ONCE DAILY. 1/16/19  Yes Isidor Chavez MD   IBUPROFEN PO Take 200 mg by mouth every 8 hours as needed for moderate pain    Reported, Patient   indomethacin (INDOCIN) 50 MG capsule Take 1 capsule (50 mg) by mouth 3 times daily (with meals) for 5 days 3/15/19 3/20/19  ALDEN Lowery MD       ALLERGIES:     Allergies   Allergen Reactions     Animal Dander      Deer hair       ROS:  Constitutional, HEENT, cardiovascular, pulmonary, gi and gu systems are negative, except as otherwise noted.      EXAM:  /68 (BP Location: Left arm, Patient Position: Chair, Cuff Size: Adult Large)   Pulse 57   Temp 96.3  F (35.7  C) (Tympanic)   Wt 90.3 kg (199 lb)   SpO2 97%   BMI 32.12 kg/m   Body mass index is 32.12 kg/m .   GENERAL APPEARANCE: healthy, alert and no distress  RESP: Breathing comfortably  NEURO: Normal strength and tone, mentation intact and speech normal  Lab/ X-ray  Pending    ASSESSMENT/PLAN:    ICD-10-CM    1. Gout, unspecified cause, unspecified chronicity, unspecified site M10.9 Uric acid     allopurinol (ZYLOPRIM) 100 MG tablet   No problems taking 100 mg allopurinol.  He will get another uric acid today we will go up to 200 mg of allopurinol and see him in 1 month.  Discussed that the chlorthalidone could elevate uric acid but we will not adjust that at this visit we will reassess at next visit could increase the ACE inhibitor.  He does have hypertension and it is stable right now.      ADRI Lowery MD  April 15, 2019

## 2019-04-15 ENCOUNTER — OFFICE VISIT (OUTPATIENT)
Dept: FAMILY MEDICINE | Facility: OTHER | Age: 69
End: 2019-04-15
Attending: FAMILY MEDICINE
Payer: COMMERCIAL

## 2019-04-15 VITALS
SYSTOLIC BLOOD PRESSURE: 134 MMHG | DIASTOLIC BLOOD PRESSURE: 68 MMHG | TEMPERATURE: 96.3 F | WEIGHT: 199 LBS | BODY MASS INDEX: 32.12 KG/M2 | HEART RATE: 57 BPM | OXYGEN SATURATION: 97 %

## 2019-04-15 DIAGNOSIS — M10.9 GOUT, UNSPECIFIED CAUSE, UNSPECIFIED CHRONICITY, UNSPECIFIED SITE: Primary | ICD-10-CM

## 2019-04-15 LAB — URATE SERPL-MCNC: 5.7 MG/DL (ref 3.5–7.2)

## 2019-04-15 PROCEDURE — 84550 ASSAY OF BLOOD/URIC ACID: CPT | Mod: ZL | Performed by: FAMILY MEDICINE

## 2019-04-15 PROCEDURE — 99212 OFFICE O/P EST SF 10 MIN: CPT | Performed by: FAMILY MEDICINE

## 2019-04-15 PROCEDURE — 36415 COLL VENOUS BLD VENIPUNCTURE: CPT | Mod: ZL | Performed by: FAMILY MEDICINE

## 2019-04-15 PROCEDURE — G0463 HOSPITAL OUTPT CLINIC VISIT: HCPCS

## 2019-04-15 RX ORDER — ALLOPURINOL 100 MG/1
200 TABLET ORAL DAILY
Qty: 60 TABLET | Refills: 5 | Status: SHIPPED | OUTPATIENT
Start: 2019-04-15 | End: 2019-11-25

## 2019-04-15 ASSESSMENT — PAIN SCALES - GENERAL: PAINLEVEL: NO PAIN (0)

## 2019-04-15 ASSESSMENT — ANXIETY QUESTIONNAIRES
3. WORRYING TOO MUCH ABOUT DIFFERENT THINGS: SEVERAL DAYS
1. FEELING NERVOUS, ANXIOUS, OR ON EDGE: SEVERAL DAYS
GAD7 TOTAL SCORE: 4
2. NOT BEING ABLE TO STOP OR CONTROL WORRYING: SEVERAL DAYS
7. FEELING AFRAID AS IF SOMETHING AWFUL MIGHT HAPPEN: NOT AT ALL
5. BEING SO RESTLESS THAT IT IS HARD TO SIT STILL: NOT AT ALL
4. TROUBLE RELAXING: NOT AT ALL
IF YOU CHECKED OFF ANY PROBLEMS ON THIS QUESTIONNAIRE, HOW DIFFICULT HAVE THESE PROBLEMS MADE IT FOR YOU TO DO YOUR WORK, TAKE CARE OF THINGS AT HOME, OR GET ALONG WITH OTHER PEOPLE: SOMEWHAT DIFFICULT
6. BECOMING EASILY ANNOYED OR IRRITABLE: SEVERAL DAYS

## 2019-04-15 ASSESSMENT — PATIENT HEALTH QUESTIONNAIRE - PHQ9: SUM OF ALL RESPONSES TO PHQ QUESTIONS 1-9: 7

## 2019-04-15 NOTE — NURSING NOTE
"Chief Complaint   Patient presents with     Arthritis       Initial /68 (BP Location: Left arm, Patient Position: Chair, Cuff Size: Adult Large)   Pulse 57   Temp 96.3  F (35.7  C) (Tympanic)   Wt 90.3 kg (199 lb)   SpO2 97%   BMI 32.12 kg/m   Estimated body mass index is 32.12 kg/m  as calculated from the following:    Height as of 3/15/19: 1.676 m (5' 6\").    Weight as of this encounter: 90.3 kg (199 lb).  Medication Reconciliation: complete    LEILA DE OLIVEIRA LPN  "

## 2019-04-15 NOTE — LETTER
My Depression Action Plan  Name: Oswald Goel   Date of Birth 1950  Date: 4/15/2019    My doctor: ALDEN Lowery   My clinic: [facility] [place]  [address]  [place] [address]  [phone]          GREEN    ZONE   Good Control    What it looks like:     Things are going generally well. You have normal up s and down s. You may even feel depressed from time to time, but bad moods usually last less than a day.   What you need to do:  1. Continue to care for yourself (see self care plan)  2. Check your depression survival kit and update it as needed  3. Follow your physician s recommendations including any medication.  4. Do not stop taking medication unless you consult with your physician first.           YELLOW         ZONE Getting Worse    What it looks like:     Depression is starting to interfere with your life.     It may be hard to get out of bed; you may be starting to isolate yourself from others.    Symptoms of depression are starting to last most all day and this has happened for several days.     You may have suicidal thoughts but they are not constant.   What you need to do:     1. Call your care team, your response to treatment will improve if you keep your care team informed of your progress. Yellow periods are signs an adjustment may need to be made.     2. Continue your self-care, even if you have to fake it!    3. Talk to someone in your support network    4. Open up your depression survival kit           RED    ZONE Medical Alert - Get Help    What it looks like:     Depression is seriously interfering with your life.     You may experience these or other symptoms: You can t get out of bed most days, can t work or engage in other necessary activities, you have trouble taking care of basic hygiene, or basic responsibilities, thoughts of suicide or death that will not go away, self-injurious behavior.     What you need to do:  1. Call your care team and request  a same-day appointment. If they are not available (weekends or after hours) call your local crisis line, emergency room or 911.            Depression Self Care Plan / Survival Kit    Self-Care for Depression  Here s the deal. Your body and mind are really not as separate as most people think.  What you do and think affects how you feel and how you feel influences what you do and think. This means if you do things that people who feel good do, it will help you feel better.  Sometimes this is all it takes.  There is also a place for medication and therapy depending on how severe your depression is, so be sure to consult with your medical provider and/ or Behavioral Health Consultant if your symptoms are worsening or not improving.     In order to better manage my stress, I will:    Exercise  Get some form of exercise, every day. This will help reduce pain and release endorphins, the  feel good  chemicals in your brain. This is almost as good as taking antidepressants!  This is not the same as joining a gym and then never going! (they count on that by the way ) It can be as simple as just going for a walk or doing some gardening, anything that will get you moving.      Hygiene   Maintain good hygiene (Get out of bed in the morning, Make your bed, Brush your teeth, Take a shower, and Get dressed like you were going to work, even if you are unemployed).  If your clothes don't fit try to get ones that do.    Diet  I will strive to eat foods that are good for me, drink plenty of water, and avoid excessive sugar, caffeine, alcohol, and other mood-altering substances.  Some foods that are helpful in depression are: complex carbohydrates, B vitamins, flaxseed, fish or fish oil, fresh fruits and vegetables.    Psychotherapy  I agree to participate in Individual Therapy (if recommended).    Medication  If prescribed medications, I agree to take them.  Missing doses can result in serious side effects.  I understand that drinking  alcohol, or other illicit drug use, may cause potential side effects.  I will not stop my medication abruptly without first discussing it with my provider.    Staying Connected With Others  I will stay in touch with my friends, family members, and my primary care provider/team.    Use your imagination  Be creative.  We all have a creative side; it doesn t matter if it s oil painting, sand castles, or mud pies! This will also kick up the endorphins.    Witness Beauty  (AKA stop and smell the roses) Take a look outside, even in mid-winter. Notice colors, textures. Watch the squirrels and birds.     Service to others  Be of service to others.  There is always someone else in need.  By helping others we can  get out of ourselves  and remember the really important things.  This also provides opportunities for practicing all the other parts of the program.    Humor  Laugh and be silly!  Adjust your TV habits for less news and crime-drama and more comedy.    Control your stress  Try breathing deep, massage therapy, biofeedback, and meditation. Find time to relax each day.     My support system    Clinic Contact:  Phone number:    Contact 1:  Phone number:    Contact 2:  Phone number:    [employer]/:  Phone number:    Therapist:  Phone number:    Local crisis center:    Phone number:    Other community support:  Phone number:

## 2019-04-16 ASSESSMENT — ANXIETY QUESTIONNAIRES: GAD7 TOTAL SCORE: 4

## 2019-05-08 NOTE — PROGRESS NOTES
SUBJECTIVE:   Oswald Goel is a 68 year old male who presents to clinic today for the following   health issues:      Gout      Duration: Follow up    Description (location/character/radiation): symptoms have improved slightly. Left second toe, and fingers     Intensity:  mild    Accompanying signs and symptoms: Diarrhea    History (similar episodes/previous evaluation): None    Precipitating or alleviating factors: None    Therapies tried and outcome: Allopurinol, Indocin, and ES Tylenol. Started Melatonin to help sleep at night.        [facility]    Oswald Goel, 68 year old, male presents with   Chief Complaint   Patient presents with     Arthritis       PAST MEDICAL HISTORY:  Past Medical History:   Diagnosis Date     Depressive disorder, not elsewhere classified 02/22/2011     Hypertension, Benign 02/22/2011     Hypertrophy (benign) of prostate without urinary o 10/10/2007     Nonallopathic lesion of cervical region, not elsewhere classified 08/27/2008     Pure hypercholesterolemia 12/29/1999     RBBB 02/22/2011       PAST SURGICAL HISTORY:  Past Surgical History:   Procedure Laterality Date     COLONOSCOPY  03-    repeat 10 yrs     COLONOSCOPY  2005     HERNIA REPAIR       left arthroscopy       RELEASE CARPAL TUNNEL      RT     TONSILLECTOMY       TRANSPOSITION ULNAR NERVE (ELBOW) Left 11/19/2015    Procedure: TRANSPOSITION ULNAR NERVE (ELBOW);  Surgeon: Mahesh Capps MD;  Location: [facility]       MEDICATIONS:  Prior to Admission medications    Medication Sig Start Date End Date Taking? Authorizing Provider   allopurinol (ZYLOPRIM) 100 MG tablet Take 2 tablets (200 mg) by mouth daily 4/15/19  Yes ALDEN Lowery MD   amLODIPine (NORVASC) 2.5 MG tablet TAKE 1 TABLET BY MOUTH ONCE DAILY. 5/16/19  Yes ALEDN Lowery MD   Ascorbic Acid (VITAMIN C PO) Daily   Yes Reported, Patient   Ferrous Gluconate 240 (27 Fe) MG TABS Take 1 mg by mouth daily   Yes Reported, Patient   fish  "oil-omega-3 fatty acids 1000 MG capsule Take 1 g by mouth daily   Yes Reported, Patient   lisinopril (PRINIVIL/ZESTRIL) 20 MG tablet Take 1 tablet (20 mg) by mouth daily 10/29/18  Yes ALDEN Lowery MD   Melatonin 10 MG TABS tablet Take 10 mg by mouth nightly as needed for sleep   Yes Reported, Patient   Multiple Vitamin (DAILY MULTIVITAMIN PO) Take 1 tablet by Oral route every day with food   Yes Reported, Patient   simvastatin (ZOCOR) 20 MG tablet TAKE 1 TABLET BY MOUTH ONCE DAILY. 1/16/19  Yes Isidro Chavez MD       ALLERGIES:     Allergies   Allergen Reactions     Animal Dander      Deer hair       ROS:  Constitutional, HEENT, cardiovascular, pulmonary, gi and gu systems are negative, except as otherwise noted.      EXAM:  /68 (BP Location: Left arm, Patient Position: Sitting, Cuff Size: Adult Regular)   Pulse 60   Temp 97.5  F (36.4  C) (Tympanic)   Resp 20   Ht 1.676 m (5' 6\")   Wt 90.3 kg (199 lb)   SpO2 98%   BMI 32.12 kg/m   Body mass index is 32.12 kg/m .   GENERAL APPEARANCE: healthy, alert and no distress  EYES: Eyes grossly normal to inspection, PERRL and conjunctivae and sclerae normal  RESP: lungs clear to auscultation - no rales, rhonchi or wheezes  CV: regular rates and rhythm, normal S1 S2, no S3 or S4 and no murmur, click or rub  MS: extremities normal- no gross deformities noted, he feels his toes better.  Lab/ X-ray  Pending    ASSESSMENT/PLAN:    ICD-10-CM    1. Gout, unspecified cause, unspecified chronicity, unspecified site M10.9 Uric acid     Comprehensive metabolic panel     CBC with platelets differential   2. Essential hypertension, benign I10 amLODIPine (NORVASC) 2.5 MG tablet   His blood pressure is controlled but we discussed that the hygroton could be precipitating his gout so we will stop that and will increase his Norvasc from 5 mg a day to 7.5 mg a day.  He has the 5 mg will write a 2.5 mg to take with the 5mg.  Lab work today to follow-up with the gout and will " see him in 1 week to recheck.      ADRI Lowery MD  May 16, 2019

## 2019-05-16 ENCOUNTER — OFFICE VISIT (OUTPATIENT)
Dept: FAMILY MEDICINE | Facility: OTHER | Age: 69
End: 2019-05-16
Attending: FAMILY MEDICINE
Payer: COMMERCIAL

## 2019-05-16 VITALS
DIASTOLIC BLOOD PRESSURE: 68 MMHG | HEART RATE: 60 BPM | HEIGHT: 66 IN | OXYGEN SATURATION: 98 % | BODY MASS INDEX: 31.98 KG/M2 | TEMPERATURE: 97.5 F | SYSTOLIC BLOOD PRESSURE: 122 MMHG | WEIGHT: 199 LBS | RESPIRATION RATE: 20 BRPM

## 2019-05-16 DIAGNOSIS — M10.9 GOUT, UNSPECIFIED CAUSE, UNSPECIFIED CHRONICITY, UNSPECIFIED SITE: Primary | ICD-10-CM

## 2019-05-16 DIAGNOSIS — I10 ESSENTIAL HYPERTENSION, BENIGN: ICD-10-CM

## 2019-05-16 LAB
ALBUMIN SERPL-MCNC: 3.7 G/DL (ref 3.4–5)
ALP SERPL-CCNC: 117 U/L (ref 40–150)
ALT SERPL W P-5'-P-CCNC: 39 U/L (ref 0–70)
ANION GAP SERPL CALCULATED.3IONS-SCNC: 7 MMOL/L (ref 3–14)
AST SERPL W P-5'-P-CCNC: 34 U/L (ref 0–45)
BASOPHILS # BLD AUTO: 0 10E9/L (ref 0–0.2)
BASOPHILS NFR BLD AUTO: 0.3 %
BILIRUB SERPL-MCNC: 0.8 MG/DL (ref 0.2–1.3)
BUN SERPL-MCNC: 22 MG/DL (ref 7–30)
CALCIUM SERPL-MCNC: 9 MG/DL (ref 8.5–10.1)
CHLORIDE SERPL-SCNC: 103 MMOL/L (ref 94–109)
CO2 SERPL-SCNC: 25 MMOL/L (ref 20–32)
CREAT SERPL-MCNC: 0.98 MG/DL (ref 0.66–1.25)
DIFFERENTIAL METHOD BLD: ABNORMAL
EOSINOPHIL # BLD AUTO: 0.2 10E9/L (ref 0–0.7)
EOSINOPHIL NFR BLD AUTO: 3.1 %
ERYTHROCYTE [DISTWIDTH] IN BLOOD BY AUTOMATED COUNT: 13.2 % (ref 10–15)
GFR SERPL CREATININE-BSD FRML MDRD: 77 ML/MIN/{1.73_M2}
GLUCOSE SERPL-MCNC: 105 MG/DL (ref 70–99)
HCT VFR BLD AUTO: 37.6 % (ref 40–53)
HGB BLD-MCNC: 12.5 G/DL (ref 13.3–17.7)
IMM GRANULOCYTES # BLD: 0 10E9/L (ref 0–0.4)
IMM GRANULOCYTES NFR BLD: 0.2 %
LYMPHOCYTES # BLD AUTO: 1 10E9/L (ref 0.8–5.3)
LYMPHOCYTES NFR BLD AUTO: 18.2 %
MCH RBC QN AUTO: 30.5 PG (ref 26.5–33)
MCHC RBC AUTO-ENTMCNC: 33.2 G/DL (ref 31.5–36.5)
MCV RBC AUTO: 92 FL (ref 78–100)
MONOCYTES # BLD AUTO: 0.5 10E9/L (ref 0–1.3)
MONOCYTES NFR BLD AUTO: 9.3 %
NEUTROPHILS # BLD AUTO: 3.9 10E9/L (ref 1.6–8.3)
NEUTROPHILS NFR BLD AUTO: 68.9 %
NRBC # BLD AUTO: 0 10*3/UL
NRBC BLD AUTO-RTO: 0 /100
PLATELET # BLD AUTO: 241 10E9/L (ref 150–450)
POTASSIUM SERPL-SCNC: 4.1 MMOL/L (ref 3.4–5.3)
PROT SERPL-MCNC: 8.2 G/DL (ref 6.8–8.8)
RBC # BLD AUTO: 4.1 10E12/L (ref 4.4–5.9)
SODIUM SERPL-SCNC: 135 MMOL/L (ref 133–144)
URATE SERPL-MCNC: 6.6 MG/DL (ref 3.5–7.2)
WBC # BLD AUTO: 5.7 10E9/L (ref 4–11)

## 2019-05-16 PROCEDURE — 80053 COMPREHEN METABOLIC PANEL: CPT | Mod: ZL | Performed by: FAMILY MEDICINE

## 2019-05-16 PROCEDURE — 85025 COMPLETE CBC W/AUTO DIFF WBC: CPT | Mod: ZL | Performed by: FAMILY MEDICINE

## 2019-05-16 PROCEDURE — 99213 OFFICE O/P EST LOW 20 MIN: CPT | Performed by: FAMILY MEDICINE

## 2019-05-16 PROCEDURE — G0463 HOSPITAL OUTPT CLINIC VISIT: HCPCS

## 2019-05-16 PROCEDURE — 36415 COLL VENOUS BLD VENIPUNCTURE: CPT | Mod: ZL | Performed by: FAMILY MEDICINE

## 2019-05-16 PROCEDURE — 84550 ASSAY OF BLOOD/URIC ACID: CPT | Mod: ZL | Performed by: FAMILY MEDICINE

## 2019-05-16 RX ORDER — PHENOL 1.4 %
10 AEROSOL, SPRAY (ML) MUCOUS MEMBRANE
COMMUNITY
End: 2019-11-25

## 2019-05-16 RX ORDER — AMLODIPINE BESYLATE 2.5 MG/1
TABLET ORAL
Qty: 30 TABLET | Refills: 5 | Status: SHIPPED | OUTPATIENT
Start: 2019-05-16 | End: 2019-11-15

## 2019-05-16 ASSESSMENT — PAIN SCALES - GENERAL: PAINLEVEL: MILD PAIN (2)

## 2019-05-16 ASSESSMENT — MIFFLIN-ST. JEOR: SCORE: 1605.41

## 2019-05-16 NOTE — NURSING NOTE
"Chief Complaint   Patient presents with     Arthritis       Initial /68 (BP Location: Left arm, Patient Position: Sitting, Cuff Size: Adult Regular)   Pulse 60   Temp 97.5  F (36.4  C) (Tympanic)   Resp 20   Ht 1.676 m (5' 6\")   Wt 90.3 kg (199 lb)   SpO2 98%   BMI 32.12 kg/m   Estimated body mass index is 32.12 kg/m  as calculated from the following:    Height as of this encounter: 1.676 m (5' 6\").    Weight as of this encounter: 90.3 kg (199 lb).  Medication Reconciliation: complete    Barbie Irvin LPN  "

## 2019-05-17 NOTE — PROGRESS NOTES
SUBJECTIVE:   Oswald Goel is a 68 year old male who presents to clinic today for the following   health issues:        Gout       Duration: years    Description (location/character/radiation): joints    Intensity:  moderate    Accompanying signs and symptoms: none    History (similar episodes/previous evaluation): None    Precipitating or alleviating factors: None    Therapies tried and outcome: allopurinol and tylenol       Low Hbg      Duration: has tried to give blood last 2 Tuesday's and unable to donate d/t low hbg    Description (location/character/radiation): missed by a point    Intensity:  moderate    Accompanying signs and symptoms: none    History (similar episodes/previous evaluation): None    Precipitating or alleviating factors: None    Therapies tried and outcome: iron daily       [facility]    Oswald Goel, 68 year old, male presents with   Chief Complaint   Patient presents with     Arthritis     Anemia       PAST MEDICAL HISTORY:  Past Medical History:   Diagnosis Date     Depressive disorder, not elsewhere classified 02/22/2011     Hypertension, Benign 02/22/2011     Hypertrophy (benign) of prostate without urinary o 10/10/2007     Nonallopathic lesion of cervical region, not elsewhere classified 08/27/2008     Pure hypercholesterolemia 12/29/1999     RBBB 02/22/2011       PAST SURGICAL HISTORY:  Past Surgical History:   Procedure Laterality Date     COLONOSCOPY  03-    repeat 10 yrs     COLONOSCOPY  2005     HERNIA REPAIR       left arthroscopy       RELEASE CARPAL TUNNEL      RT     TONSILLECTOMY       TRANSPOSITION ULNAR NERVE (ELBOW) Left 11/19/2015    Procedure: TRANSPOSITION ULNAR NERVE (ELBOW);  Surgeon: Mahesh Capps MD;  Location: [facility]       MEDICATIONS:  Prior to Admission medications    Medication Sig Start Date End Date Taking? Authorizing Provider   allopurinol (ZYLOPRIM) 100 MG tablet Take 2 tablets (200 mg) by mouth daily 4/15/19  Yes  "ALDEN Lowery MD   amLODIPine (NORVASC) 2.5 MG tablet TAKE 1 TABLET BY MOUTH ONCE DAILY. 5/16/19  Yes ALDEN Lowery MD   Ascorbic Acid (VITAMIN C PO) Daily   Yes Reported, Patient   Ferrous Gluconate 240 (27 Fe) MG TABS Take 1 mg by mouth daily   Yes Reported, Patient   fish oil-omega-3 fatty acids 1000 MG capsule Take 1 g by mouth daily   Yes Reported, Patient   lisinopril (PRINIVIL/ZESTRIL) 20 MG tablet Take 1 tablet (20 mg) by mouth daily 10/29/18  Yes ALDEN Lowery MD   Melatonin 10 MG TABS tablet Take 10 mg by mouth nightly as needed for sleep   Yes Reported, Patient   Multiple Vitamin (DAILY MULTIVITAMIN PO) Take 1 tablet by Oral route every day with food   Yes Reported, Patient   simvastatin (ZOCOR) 20 MG tablet TAKE 1 TABLET BY MOUTH ONCE DAILY. 1/16/19  Yes Isidro Chavez MD       ALLERGIES:     Allergies   Allergen Reactions     Animal Dander      Deer hair       ROS:  Constitutional, HEENT, cardiovascular, pulmonary, gi and gu systems are negative, except as otherwise noted.      EXAM:  /58   Pulse 59   Temp 96.9  F (36.1  C)   Ht 1.676 m (5' 6\")   Wt 90.7 kg (200 lb)   SpO2 96%   BMI 32.28 kg/m   Body mass index is 32.28 kg/m .   GENERAL APPEARANCE: healthy, alert and no distress  RESP: lungs clear to auscultation - no rales, rhonchi or wheezes  SKIN: no suspicious lesions or rashes  Lab/ X-ray  Pending    ASSESSMENT/PLAN:    ICD-10-CM    1. Gout, unspecified cause, unspecified chronicity, unspecified site M10.9 Uric acid   2. Anemia, unspecified type D64.9 CBC with platelets differential     Iron and iron binding capacity     Ferritin     Vitamin B12     Folate RBC   We had stopped his diuretic because it can increase uric acid level.  His left great toe has had no further problems.  We will get a uric acid level today.  He will continue with the allopurinol.  He has had gout most of his life.  He is tried to donate blood and is been a little anemic we will check a CBC with iron " studies and B12 and folate.  We will plan to see him in 6 months sooner if there are problems      ADRI Lowery MD  May 24, 2019

## 2019-05-24 ENCOUNTER — OFFICE VISIT (OUTPATIENT)
Dept: FAMILY MEDICINE | Facility: OTHER | Age: 69
End: 2019-05-24
Attending: FAMILY MEDICINE
Payer: COMMERCIAL

## 2019-05-24 VITALS
WEIGHT: 200 LBS | SYSTOLIC BLOOD PRESSURE: 132 MMHG | HEIGHT: 66 IN | HEART RATE: 59 BPM | OXYGEN SATURATION: 96 % | TEMPERATURE: 96.9 F | DIASTOLIC BLOOD PRESSURE: 58 MMHG | BODY MASS INDEX: 32.14 KG/M2

## 2019-05-24 DIAGNOSIS — M10.9 GOUT, UNSPECIFIED CAUSE, UNSPECIFIED CHRONICITY, UNSPECIFIED SITE: Primary | ICD-10-CM

## 2019-05-24 DIAGNOSIS — D64.9 ANEMIA, UNSPECIFIED TYPE: ICD-10-CM

## 2019-05-24 LAB
BASOPHILS # BLD AUTO: 0 10E9/L (ref 0–0.2)
BASOPHILS NFR BLD AUTO: 0.4 %
DIFFERENTIAL METHOD BLD: ABNORMAL
EOSINOPHIL # BLD AUTO: 0.3 10E9/L (ref 0–0.7)
EOSINOPHIL NFR BLD AUTO: 5.2 %
ERYTHROCYTE [DISTWIDTH] IN BLOOD BY AUTOMATED COUNT: 13.6 % (ref 10–15)
FERRITIN SERPL-MCNC: 80 NG/ML (ref 26–388)
HCT VFR BLD AUTO: 38 % (ref 40–53)
HGB BLD-MCNC: 12.5 G/DL (ref 13.3–17.7)
IMM GRANULOCYTES # BLD: 0 10E9/L (ref 0–0.4)
IMM GRANULOCYTES NFR BLD: 0.4 %
IRON SATN MFR SERPL: 22 % (ref 15–46)
IRON SERPL-MCNC: 74 UG/DL (ref 35–180)
LYMPHOCYTES # BLD AUTO: 1.1 10E9/L (ref 0.8–5.3)
LYMPHOCYTES NFR BLD AUTO: 20.6 %
MCH RBC QN AUTO: 30.6 PG (ref 26.5–33)
MCHC RBC AUTO-ENTMCNC: 32.9 G/DL (ref 31.5–36.5)
MCV RBC AUTO: 93 FL (ref 78–100)
MONOCYTES # BLD AUTO: 0.5 10E9/L (ref 0–1.3)
MONOCYTES NFR BLD AUTO: 9.7 %
NEUTROPHILS # BLD AUTO: 3.3 10E9/L (ref 1.6–8.3)
NEUTROPHILS NFR BLD AUTO: 63.7 %
NRBC # BLD AUTO: 0 10*3/UL
NRBC BLD AUTO-RTO: 0 /100
PLATELET # BLD AUTO: 249 10E9/L (ref 150–450)
RBC # BLD AUTO: 4.08 10E12/L (ref 4.4–5.9)
TIBC SERPL-MCNC: 335 UG/DL (ref 240–430)
URATE SERPL-MCNC: 5.8 MG/DL (ref 3.5–7.2)
VIT B12 SERPL-MCNC: 451 PG/ML (ref 193–986)
WBC # BLD AUTO: 5.2 10E9/L (ref 4–11)

## 2019-05-24 PROCEDURE — 99000 SPECIMEN HANDLING OFFICE-LAB: CPT | Mod: ZL | Performed by: FAMILY MEDICINE

## 2019-05-24 PROCEDURE — 82747 ASSAY OF FOLIC ACID RBC: CPT | Mod: ZL | Performed by: FAMILY MEDICINE

## 2019-05-24 PROCEDURE — G0463 HOSPITAL OUTPT CLINIC VISIT: HCPCS

## 2019-05-24 PROCEDURE — 82728 ASSAY OF FERRITIN: CPT | Mod: ZL | Performed by: FAMILY MEDICINE

## 2019-05-24 PROCEDURE — 85025 COMPLETE CBC W/AUTO DIFF WBC: CPT | Mod: ZL | Performed by: FAMILY MEDICINE

## 2019-05-24 PROCEDURE — 99213 OFFICE O/P EST LOW 20 MIN: CPT | Performed by: FAMILY MEDICINE

## 2019-05-24 PROCEDURE — 84550 ASSAY OF BLOOD/URIC ACID: CPT | Mod: ZL | Performed by: FAMILY MEDICINE

## 2019-05-24 PROCEDURE — 36415 COLL VENOUS BLD VENIPUNCTURE: CPT | Mod: ZL | Performed by: FAMILY MEDICINE

## 2019-05-24 PROCEDURE — 83550 IRON BINDING TEST: CPT | Mod: ZL | Performed by: FAMILY MEDICINE

## 2019-05-24 PROCEDURE — 85014 HEMATOCRIT: CPT | Mod: ZL | Performed by: FAMILY MEDICINE

## 2019-05-24 PROCEDURE — 82607 VITAMIN B-12: CPT | Mod: ZL | Performed by: FAMILY MEDICINE

## 2019-05-24 PROCEDURE — 83540 ASSAY OF IRON: CPT | Mod: ZL | Performed by: FAMILY MEDICINE

## 2019-05-24 ASSESSMENT — MIFFLIN-ST. JEOR: SCORE: 1609.94

## 2019-05-24 ASSESSMENT — PAIN SCALES - GENERAL: PAINLEVEL: NO PAIN (0)

## 2019-05-24 NOTE — NURSING NOTE
"Chief Complaint   Patient presents with     Arthritis       Initial /58   Pulse 59   Temp 96.9  F (36.1  C)   Ht 1.676 m (5' 6\")   Wt 90.7 kg (200 lb)   SpO2 96%   BMI 32.28 kg/m   Estimated body mass index is 32.28 kg/m  as calculated from the following:    Height as of this encounter: 1.676 m (5' 6\").    Weight as of this encounter: 90.7 kg (200 lb).  Medication Reconciliation: complete    Wellington Mccoy LPN  "

## 2019-05-26 LAB
FOLATE RBC-MCNC: NORMAL NG/ML
HCT VFR BLD CALC: 38 %

## 2019-05-28 ENCOUNTER — TELEPHONE (OUTPATIENT)
Dept: FAMILY MEDICINE | Facility: OTHER | Age: 69
End: 2019-05-28

## 2019-05-28 NOTE — TELEPHONE ENCOUNTER
Left message, awaiting call back colonoscopy is UTD until 2021. No other medication noted in Dr's note.    LEILA DE OLIVEIRA

## 2019-05-28 NOTE — TELEPHONE ENCOUNTER
Patient called and was given his lab results.  He is wondering if he needs to take any other pills than what is prescribed to him.  He states he thought the   Mentioned something else.  He would also like to get a colonoscopy set up.

## 2019-05-30 NOTE — TELEPHONE ENCOUNTER
Patient called back and inquired about his labs.  Results were discussed.  Patient was satisfied with results and had no further questions.

## 2019-07-19 DIAGNOSIS — E78.00 PURE HYPERCHOLESTEROLEMIA: ICD-10-CM

## 2019-07-22 RX ORDER — SIMVASTATIN 20 MG
TABLET ORAL
Qty: 90 TABLET | Refills: 2 | Status: SHIPPED | OUTPATIENT
Start: 2019-07-22 | End: 2020-04-27

## 2019-07-22 NOTE — TELEPHONE ENCOUNTER
simvastatin  Last Written Prescription Date: 1/16/19  Last Fill Quantity: 90 # of Refills: 1  Last Office Visit: 5/24/19

## 2019-11-08 DIAGNOSIS — I10 ESSENTIAL HYPERTENSION, BENIGN: ICD-10-CM

## 2019-11-08 NOTE — TELEPHONE ENCOUNTER
Lisinopril      Last Written Prescription Date:  10/29/18  Last Fill Quantity: 90,   # refills: 3  Last Office Visit: 05/24/19  Future Office visit:    Next 5 appointments (look out 90 days)    Nov 25, 2019  8:30 AM CST  (Arrive by 8:15 AM)  SHORT with ALDEN Lowery MD  [facility] [address]  [place] [address]  [phone]

## 2019-11-11 RX ORDER — LISINOPRIL 20 MG/1
20 TABLET ORAL DAILY
Qty: 90 TABLET | Refills: 1 | Status: SHIPPED | OUTPATIENT
Start: 2019-11-11 | End: 2020-05-07

## 2019-11-15 DIAGNOSIS — I10 ESSENTIAL HYPERTENSION, BENIGN: ICD-10-CM

## 2019-11-15 RX ORDER — AMLODIPINE BESYLATE 5 MG/1
TABLET ORAL
Qty: 90 TABLET | Refills: 0 | Status: SHIPPED | OUTPATIENT
Start: 2019-11-15 | End: 2020-02-20

## 2019-11-15 RX ORDER — AMLODIPINE BESYLATE 2.5 MG/1
TABLET ORAL
Qty: 90 TABLET | Refills: 1 | Status: SHIPPED | OUTPATIENT
Start: 2019-11-15 | End: 2020-05-12

## 2019-11-15 NOTE — TELEPHONE ENCOUNTER
Not on med list, please advise.    amLODIPine (NORVASC) 5 MG tablet (Discontinued)      Last Written Prescription Date:  10/29/18-5/16/19  Last Fill Quantity: 90,   # refills: 3  Last Office Visit: 5/24/19  Future Office visit:    Next 5 appointments (look out 90 days)    Nov 25, 2019  8:30 AM CST  (Arrive by 8:15 AM)  SHORT with R Raul Lowery MD  [facility] [address]  [place] [address]  [phone]           Routing refill request to provider for review/approval because:  Drug not on the [facility], P or  Health refill protocol or controlled substance

## 2019-11-19 NOTE — PROGRESS NOTES
Subjective     Oswald Goel is a 68 year old male who presents to clinic today for the following health issues:    HPI   Hypertension Follow-up      Do you check your blood pressure regularly outside of the clinic? No     Are you following a low salt diet? No    Are your blood pressures ever more than 140 on the top number (systolic) OR more   than 90 on the bottom number (diastolic), for example 140/90? Yes      How many servings of fruits and vegetables do you eat daily?  0-1    On average, how many sweetened beverages do you drink each day (soda, juice, sweet tea, etc)?   1    How many days per week do you miss taking your medication? 0    Musculoskeletal problem/pain      Duration: off and on for awhile    Description  Location: right foot, big toe    Accompanying signs and symptoms: Just pain    History  Previous similar problem: YES- gout  Previous evaluation:  none    Precipitating or alleviating factors:  Trauma or overuse: no   Aggravating factors include: none    Therapies tried and outcome: acetaminophen was on allopurinol in the past but went off the medication.  No history of trauma to the foot.    He also has a lesion on his right auricle apex it off and it grows back.       His next colonoscopy is due in 2021          PAST MEDICAL HISTORY:  Past Medical History:   Diagnosis Date     Depressive disorder, not elsewhere classified 02/22/2011     Hypertension, Benign 02/22/2011     Hypertrophy (benign) of prostate without urinary o 10/10/2007     Nonallopathic lesion of cervical region, not elsewhere classified 08/27/2008     Pure hypercholesterolemia 12/29/1999     RBBB 02/22/2011       PAST SURGICAL HISTORY:  Past Surgical History:   Procedure Laterality Date     COLONOSCOPY  03-    repeat 10 yrs     COLONOSCOPY  2005     HERNIA REPAIR       left arthroscopy       RELEASE CARPAL TUNNEL      RT     TONSILLECTOMY       TRANSPOSITION ULNAR NERVE (ELBOW) Left 11/19/2015    Procedure: TRANSPOSITION  ULNAR NERVE (ELBOW);  Surgeon: Mahesh Capps MD;  Location: [facility]       MEDICATIONS:  Prior to Admission medications    Medication Sig Start Date End Date Taking? Authorizing Provider   allopurinol (ZYLOPRIM) 100 MG tablet Take 2 tablets (200 mg) by mouth daily 11/25/19  Yes ALDEN Lowery MD   amLODIPine (NORVASC) 2.5 MG tablet TAKE 1 TABLET BY MOUTH DAILY ALONG WITH 5MG TAB. 11/15/19  Yes ALDEN Lowery MD   amLODIPine (NORVASC) 5 MG tablet TAKE 1 TABLET BY MOUTH ONCE DAILY. 11/15/19  Yes ALDEN Lowery MD   Ascorbic Acid (VITAMIN C PO) Daily   Yes Reported, Patient   colchicine (COLCYRS) 0.6 MG tablet Take 1 tablet (0.6 mg) by mouth daily 11/25/19  Yes ALDEN Lowery MD   Ferrous Gluconate 240 (27 Fe) MG TABS Take 1 mg by mouth daily   Yes Reported, Patient   fish oil-omega-3 fatty acids 1000 MG capsule Take 1 g by mouth daily   Yes Reported, Patient   lisinopril (PRINIVIL/ZESTRIL) 20 MG tablet Take 1 tablet (20 mg) by mouth daily 11/11/19  Yes ALDEN Lowery MD   mirtazapine (REMERON) 15 MG tablet Take 1 tablet (15 mg) by mouth At Bedtime 11/25/19  Yes ALDEN Lowery MD   Multiple Vitamin (DAILY MULTIVITAMIN PO) Take 1 tablet by Oral route every day with food   Yes Reported, Patient   simvastatin (ZOCOR) 20 MG tablet TAKE 1 TABLET BY MOUTH ONCE DAILY. 7/22/19  Yes Isidro Chavez MD       ALLERGIES:     Allergies   Allergen Reactions     Animal Dander      Deer hair       ROS:  Constitutional, neuro, ENT, endocrine, pulmonary, cardiac, gastrointestinal, genitourinary, musculoskeletal, integument and psychiatric systems are negative, except as otherwise noted.      EXAM:  /70   Pulse 56   Temp 97.6  F (36.4  C) (Tympanic)   Resp 20   Wt 93.9 kg (207 lb)   SpO2 96%   BMI 33.41 kg/m   Body mass index is 33.41 kg/m .   GENERAL APPEARANCE: healthy, alert and no distress  EYES: Eyes grossly normal to inspection, PERRL and conjunctivae and sclerae normal  HENT: ear canals and TM's normal, nose  and mouth without ulcers or lesions and the right auricle has a hard growth with some pearly tissue around the edge  NECK: no adenopathy, no asymmetry, masses, or scars and thyroid normal to palpation  RESP: lungs clear to auscultation - no rales, rhonchi or wheezes  CV: regular rates and rhythm, normal S1 S2, no S3 or S4 and no murmur, click or rub  MS: Left great toe is tenderness over the MTP joint no history of trauma little swelling  NEURO: Normal strength and tone, mentation intact and speech normal  Lab/ X-ray  pending    ASSESSMENT/PLAN:    ICD-10-CM    1. Lesion of skin of right ear H61.91 OTOLARYNGOLOGY REFERRAL   2. Essential hypertension, benign I10 Comprehensive metabolic panel   3. Gout, unspecified cause, unspecified chronicity, unspecified site M10.9 Uric acid     colchicine (COLCYRS) 0.6 MG tablet     allopurinol (ZYLOPRIM) 100 MG tablet   4. Insomnia, unspecified type G47.00 mirtazapine (REMERON) 15 MG tablet   5. Need for prophylactic vaccination and inoculation against influenza Z23 INFLUENZA (HIGH DOSE) 3 VALENT VACCINE [88098]     ADMIN INFLUENZA (For MEDICARE Patients ONLY) []     Has a nonhealing skin lesion right auricle we will have him see ENT for excision.  Has hypertension we will check CMP left great toe suspect a flareup of gout check uric acid he will start colchicine 0.6 mg daily until the pain is better and then start up on his allopurinol.  Does have insomnia we will treat with Remeron.  He also wants a flu shot that was given today    RMauricio Lowery MD  November 25, 2019

## 2019-11-25 ENCOUNTER — OFFICE VISIT (OUTPATIENT)
Dept: FAMILY MEDICINE | Facility: OTHER | Age: 69
End: 2019-11-25
Attending: FAMILY MEDICINE
Payer: COMMERCIAL

## 2019-11-25 VITALS
OXYGEN SATURATION: 96 % | SYSTOLIC BLOOD PRESSURE: 132 MMHG | DIASTOLIC BLOOD PRESSURE: 70 MMHG | WEIGHT: 207 LBS | TEMPERATURE: 97.6 F | HEART RATE: 56 BPM | BODY MASS INDEX: 33.41 KG/M2 | RESPIRATION RATE: 20 BRPM

## 2019-11-25 DIAGNOSIS — G47.00 INSOMNIA, UNSPECIFIED TYPE: ICD-10-CM

## 2019-11-25 DIAGNOSIS — M10.9 GOUT, UNSPECIFIED CAUSE, UNSPECIFIED CHRONICITY, UNSPECIFIED SITE: ICD-10-CM

## 2019-11-25 DIAGNOSIS — Z23 NEED FOR PROPHYLACTIC VACCINATION AND INOCULATION AGAINST INFLUENZA: ICD-10-CM

## 2019-11-25 DIAGNOSIS — I10 ESSENTIAL HYPERTENSION, BENIGN: ICD-10-CM

## 2019-11-25 DIAGNOSIS — H61.91 LESION OF SKIN OF RIGHT EAR: Primary | ICD-10-CM

## 2019-11-25 LAB
ALBUMIN SERPL-MCNC: 3.9 G/DL (ref 3.4–5)
ALP SERPL-CCNC: 97 U/L (ref 40–150)
ALT SERPL W P-5'-P-CCNC: 39 U/L (ref 0–70)
ANION GAP SERPL CALCULATED.3IONS-SCNC: 4 MMOL/L (ref 3–14)
AST SERPL W P-5'-P-CCNC: 30 U/L (ref 0–45)
BILIRUB SERPL-MCNC: 0.6 MG/DL (ref 0.2–1.3)
BUN SERPL-MCNC: 13 MG/DL (ref 7–30)
CALCIUM SERPL-MCNC: 9.2 MG/DL (ref 8.5–10.1)
CHLORIDE SERPL-SCNC: 107 MMOL/L (ref 94–109)
CO2 SERPL-SCNC: 28 MMOL/L (ref 20–32)
CREAT SERPL-MCNC: 0.95 MG/DL (ref 0.66–1.25)
GFR SERPL CREATININE-BSD FRML MDRD: 80 ML/MIN/{1.73_M2}
GLUCOSE SERPL-MCNC: 101 MG/DL (ref 70–99)
POTASSIUM SERPL-SCNC: 4.2 MMOL/L (ref 3.4–5.3)
PROT SERPL-MCNC: 7.9 G/DL (ref 6.8–8.8)
SODIUM SERPL-SCNC: 139 MMOL/L (ref 133–144)
URATE SERPL-MCNC: 6.5 MG/DL (ref 3.5–7.2)

## 2019-11-25 PROCEDURE — 90662 IIV NO PRSV INCREASED AG IM: CPT

## 2019-11-25 PROCEDURE — G0008 ADMIN INFLUENZA VIRUS VAC: HCPCS | Performed by: FAMILY MEDICINE

## 2019-11-25 PROCEDURE — 36415 COLL VENOUS BLD VENIPUNCTURE: CPT | Mod: ZL | Performed by: FAMILY MEDICINE

## 2019-11-25 PROCEDURE — 84550 ASSAY OF BLOOD/URIC ACID: CPT | Mod: ZL | Performed by: FAMILY MEDICINE

## 2019-11-25 PROCEDURE — G0463 HOSPITAL OUTPT CLINIC VISIT: HCPCS | Mod: 25

## 2019-11-25 PROCEDURE — 99214 OFFICE O/P EST MOD 30 MIN: CPT | Performed by: FAMILY MEDICINE

## 2019-11-25 PROCEDURE — G0463 HOSPITAL OUTPT CLINIC VISIT: HCPCS

## 2019-11-25 PROCEDURE — 80053 COMPREHEN METABOLIC PANEL: CPT | Mod: ZL | Performed by: FAMILY MEDICINE

## 2019-11-25 RX ORDER — MIRTAZAPINE 15 MG/1
15 TABLET, FILM COATED ORAL AT BEDTIME
Qty: 30 TABLET | Refills: 5 | Status: SHIPPED | OUTPATIENT
Start: 2019-11-25 | End: 2021-07-23

## 2019-11-25 RX ORDER — ALLOPURINOL 100 MG/1
200 TABLET ORAL DAILY
Qty: 60 TABLET | Refills: 5 | Status: SHIPPED | OUTPATIENT
Start: 2019-11-25 | End: 2021-04-28

## 2019-11-25 RX ORDER — COLCHICINE 0.6 MG/1
0.6 TABLET ORAL DAILY
Qty: 30 TABLET | Refills: 0 | Status: SHIPPED | OUTPATIENT
Start: 2019-11-25 | End: 2021-04-28

## 2019-11-25 ASSESSMENT — ANXIETY QUESTIONNAIRES
7. FEELING AFRAID AS IF SOMETHING AWFUL MIGHT HAPPEN: SEVERAL DAYS
6. BECOMING EASILY ANNOYED OR IRRITABLE: SEVERAL DAYS
GAD7 TOTAL SCORE: 6
2. NOT BEING ABLE TO STOP OR CONTROL WORRYING: SEVERAL DAYS
IF YOU CHECKED OFF ANY PROBLEMS ON THIS QUESTIONNAIRE, HOW DIFFICULT HAVE THESE PROBLEMS MADE IT FOR YOU TO DO YOUR WORK, TAKE CARE OF THINGS AT HOME, OR GET ALONG WITH OTHER PEOPLE: SOMEWHAT DIFFICULT
5. BEING SO RESTLESS THAT IT IS HARD TO SIT STILL: NOT AT ALL
1. FEELING NERVOUS, ANXIOUS, OR ON EDGE: SEVERAL DAYS
3. WORRYING TOO MUCH ABOUT DIFFERENT THINGS: SEVERAL DAYS

## 2019-11-25 ASSESSMENT — PAIN SCALES - GENERAL: PAINLEVEL: MODERATE PAIN (5)

## 2019-11-25 ASSESSMENT — PATIENT HEALTH QUESTIONNAIRE - PHQ9
5. POOR APPETITE OR OVEREATING: SEVERAL DAYS
SUM OF ALL RESPONSES TO PHQ QUESTIONS 1-9: 6

## 2019-11-25 NOTE — NURSING NOTE
"Chief Complaint   Patient presents with     Hypertension       Initial BP (!) 150/70 (BP Location: Right arm, Patient Position: Chair, Cuff Size: Adult Large)   Pulse 56   Temp 97.6  F (36.4  C) (Tympanic)   Resp 20   Wt 93.9 kg (207 lb)   SpO2 96%   BMI 33.41 kg/m   Estimated body mass index is 33.41 kg/m  as calculated from the following:    Height as of 5/24/19: 1.676 m (5' 6\").    Weight as of this encounter: 93.9 kg (207 lb).  Medication Reconciliation: complete  Vicki Allen LPN    "

## 2019-11-26 ASSESSMENT — ANXIETY QUESTIONNAIRES: GAD7 TOTAL SCORE: 6

## 2020-01-15 ENCOUNTER — OFFICE VISIT (OUTPATIENT)
Dept: OTOLARYNGOLOGY | Facility: OTHER | Age: 70
End: 2020-01-15
Attending: OTOLARYNGOLOGY
Payer: COMMERCIAL

## 2020-01-15 VITALS
SYSTOLIC BLOOD PRESSURE: 130 MMHG | TEMPERATURE: 96.4 F | WEIGHT: 210 LBS | DIASTOLIC BLOOD PRESSURE: 72 MMHG | OXYGEN SATURATION: 96 % | HEIGHT: 66 IN | BODY MASS INDEX: 33.75 KG/M2 | HEART RATE: 52 BPM

## 2020-01-15 DIAGNOSIS — H61.21 EXCESSIVE CERUMEN IN EAR CANAL, RIGHT: Primary | ICD-10-CM

## 2020-01-15 DIAGNOSIS — H61.91 LESION OF SKIN OF RIGHT EAR: ICD-10-CM

## 2020-01-15 PROCEDURE — 88305 TISSUE EXAM BY PATHOLOGIST: CPT | Mod: TC | Performed by: PHYSICIAN ASSISTANT

## 2020-01-15 PROCEDURE — 99213 OFFICE O/P EST LOW 20 MIN: CPT | Mod: 25 | Performed by: PHYSICIAN ASSISTANT

## 2020-01-15 PROCEDURE — G0463 HOSPITAL OUTPT CLINIC VISIT: HCPCS

## 2020-01-15 PROCEDURE — 11102 TANGNTL BX SKIN SINGLE LES: CPT | Performed by: PHYSICIAN ASSISTANT

## 2020-01-15 PROCEDURE — 11102 TANGNTL BX SKIN SINGLE LES: CPT

## 2020-01-15 ASSESSMENT — MIFFLIN-ST. JEOR: SCORE: 1650.3

## 2020-01-15 ASSESSMENT — PAIN SCALES - GENERAL: PAINLEVEL: NO PAIN (0)

## 2020-01-15 NOTE — PROGRESS NOTES
Otolaryngology Consultation    Patient: Oswald Goel  : 1950    Patient presents with:  Lesion: Pt has been referred by STEPHANY Lowery for lesion on the right ear.      HPI:  Oswald Goel is a 69 year old male seen today for Skin lesion.   Skin lesion present for about 1 year. He reports it scabs and then scab falls off and then it returns.   He has no bleeding or drainage.   No pain.   Patient reports no prior SCC or BCC.   He has no significant hx of sun exposure.       Current Outpatient Rx   Medication Sig Dispense Refill     allopurinol (ZYLOPRIM) 100 MG tablet Take 2 tablets (200 mg) by mouth daily 60 tablet 5     amLODIPine (NORVASC) 2.5 MG tablet TAKE 1 TABLET BY MOUTH DAILY ALONG WITH 5MG TAB. 90 tablet 1     amLODIPine (NORVASC) 5 MG tablet TAKE 1 TABLET BY MOUTH ONCE DAILY. 90 tablet 0     Ascorbic Acid (VITAMIN C PO) Daily       colchicine (COLCYRS) 0.6 MG tablet Take 1 tablet (0.6 mg) by mouth daily 30 tablet 0     Ferrous Gluconate 240 (27 Fe) MG TABS Take 1 mg by mouth daily       fish oil-omega-3 fatty acids 1000 MG capsule Take 1 g by mouth daily       lisinopril (PRINIVIL/ZESTRIL) 20 MG tablet Take 1 tablet (20 mg) by mouth daily 90 tablet 1     mirtazapine (REMERON) 15 MG tablet Take 1 tablet (15 mg) by mouth At Bedtime 30 tablet 5     Multiple Vitamin (DAILY MULTIVITAMIN PO) Take 1 tablet by Oral route every day with food       simvastatin (ZOCOR) 20 MG tablet TAKE 1 TABLET BY MOUTH ONCE DAILY. 90 tablet 2       Allergies: Animal dander     Past Medical History:   Diagnosis Date     Depressive disorder, not elsewhere classified 2011     Hypertension, Benign 2011     Hypertrophy (benign) of prostate without urinary o 10/10/2007     Nonallopathic lesion of cervical region, not elsewhere classified 2008     Pure hypercholesterolemia 1999     RBBB 2011       Past Surgical History:   Procedure Laterality Date     COLONOSCOPY  2011    repeat 10 yrs      "COLONOSCOPY  2005     HERNIA REPAIR       left arthroscopy       RELEASE CARPAL TUNNEL      RT     TONSILLECTOMY       TRANSPOSITION ULNAR NERVE (ELBOW) Left 11/19/2015    Procedure: TRANSPOSITION ULNAR NERVE (ELBOW);  Surgeon: Mahesh Capps MD;  Location: [facility]       ENT family history reviewed    Social History     Tobacco Use     Smoking status: Former Smoker     Types: Cigarettes     Smokeless tobacco: Former User     Tobacco comment: no passive exposure, tried to quit-yes   Substance Use Topics     Alcohol use: Yes     Comment: 2 glasses, daily, yesterday     Drug use: No       Review of Systems  ROS: 10 point ROS neg other than the symptoms noted above in the HPI     Physical Exam    /72   Pulse 52   Temp 96.4  F (35.8  C) (Tympanic)   Ht 1.676 m (5' 6\")   Wt 95.3 kg (210 lb)   SpO2 96%   BMI 33.89 kg/m      General - The patient is well nourished and well developed, and appears to have good nutritional status.  Alert and oriented to person and place, answers questions and cooperates with examination appropriately.   Head and Face - Normocephalic and atraumatic, with no gross asymmetry noted.  The facial nerve is intact, with strong symmetric movements.  Voice and Breathing - The patient was breathing comfortably without the use of accessory muscles. There was no wheezing, stridor, or stertor.  The patients voice was clear and strong, and had appropriate pitch and quality.  Ears -The external auditory canals- right ear with cerumen. Cerumen removed with cupped forceps.   the tympanic membranes are intact without effusion, retraction or mass.  Bony landmarks are intact.  Right upper auricle tubercle- small 5 mm lesion. Lesions appears with crusting.   Eyes - Extraocular movements intact, and the pupils were reactive to light.  Sclera were not icteric or injected, conjunctiva were pink and moist.  Mouth - Examination of the oral cavity showed pink, healthy oral mucosa. No lesions or ulcerations " noted.  The tongue was mobile and midline, and the dentition were in good condition.    Throat - The walls of the oropharynx were smooth, pink, moist, symmetric, and had no lesions or ulcerations.  The tonsillar pillars and soft palate were symmetric.  The uvula was midline on elevation.    Neck - Normal midline excursion of the laryngotracheal complex during swallowing.  Full range of motion on passive movement.  Palpation of the occipital, submental, submandibular, internal jugular chain, and supraclavicular nodes did not demonstrate any abnormal lymph nodes or masses.  Palpation of the thyroid was soft and smooth, with no nodules or goiter appreciated.  The trachea was mobile and midline.  Nose - External contour is symmetric, no gross deflection or scars.  Nasal mucosa is pink and moist with no abnormal mucus.  The septum and turbinates were evaluated:   No polyps, masses, or purulence noted on examination.    Patient and I signed consent for shave bx.  Shave bx in typical fashion .  Area cleaned with betadyne and anesthetized with 1% lidocaine with epi . Then a #15 blade used to remove an area of her lesion and sent to pathology.  Bleeding was cauterized. Pt tolerated procedure well.  Site was cleansed and Bacitracin placed.     ASSESSMENT:      ICD-10-CM    1. Excessive cerumen in ear canal, right H61.21    2. Lesion of skin of right ear H61.91      Lesion was excised with shave biopsy. He tolerated well.   Return PRN pathology report.     This patient had a skin lesion excised today.  I have instructed the patient on wound care and signs of infection.  Written instructions provided.  We will contact the patient with pathology results.    If any additional surgery needs to be scheduled we will discuss this after pathology results are finalized.     No soaking of the wound for 3 weeks, may shower.   Sunscreen use and skin cancer preventive measures discussed           Jory Patino PA-C  ENT  [facility],  [place]  [phone]

## 2020-01-15 NOTE — PATIENT INSTRUCTIONS
Thank you for allowing Jory Patino and our ENT team to participate in your care.  If your medications are too expensive, please give the nurse a call.  We can possibly change this medication.  If you have a scheduling or an appointment question please contact our Health Unit Coordinator at their direct line [phone].   ALL nursing questions or concerns can be directed to your ENT nurse at: [phone]    POST PROCEDURE INSTRUCTIONS      Remove your dressing in 24 hours (If you have one)    Wash incision with Gentle Cleanser Twice Daily (Cetaphil, Baby Shampoo, etc)    Apply Bacitracin Ointment Three Times Daily; After 1 week, use Aquaphor Ointment     Cover with a clean dressing if in a dirty barbraa environment or when wet/soiled    Keep incision clean and dry   Do NOT soak in water such as a tub bath or swimming   Do NOT put make-up, powders, hairspray, lotions, etc on the incision       You can apply ice to the surgical area to help reduce swelling. (no longer than 20 minutes at a time)      You can use acetaminophen(Tylenol) or the prescription you received for pain.       If you have any bleeding, cover the wound with clean gauze and hold pressure for 10 Minutes. If the bleeding does not stop or is heavy and profuse, call the clinic or go to the Urgent Care/Emergency Department.    SIGNS OF INFECTION ARE:    Redness, swelling, red streaks, pus, drainage, warmth, fever, increased pain, foul smell.     Contact your primary health care provider if you notice any of the warning signs.     FOLLOW - UP    Follow-up in clinic for a nurse only visit in 7 days for suture removal.     Pathology results will be called to you when they are back. Usually 7-10 days.      6 WEEKS POST PROCEDURE      Apply ANY type of lotion to the suture site(Example - Vaseline Intensive Care or Vitamin E)    Massage the surgical area 1-2 times daily in a circular motion for 5 minutes, for a period of 2 months. This will help the scar  heal better.

## 2020-01-15 NOTE — LETTER
1/15/2020         RE: Oswald Goel  [address] [facility] [address]        Dear Colleague,    Thank you for referring your patient, Oswald Goel, to the [facility]. Please see a copy of my visit note below.    Otolaryngology Consultation    Patient: Oswald Goel  : 1950    Patient presents with:  Lesion: Pt has been referred by STEPHANY Lowery for lesion on the right ear.      HPI:  Oswald Goel is a 69 year old male seen today for Skin lesion.   Skin lesion present for about 1 year. He reports it scabs and then scab falls off and then it returns.   He has no bleeding or drainage.   No pain.   Patient reports no prior SCC or BCC.   He has no significant hx of sun exposure.       Current Outpatient Rx   Medication Sig Dispense Refill     allopurinol (ZYLOPRIM) 100 MG tablet Take 2 tablets (200 mg) by mouth daily 60 tablet 5     amLODIPine (NORVASC) 2.5 MG tablet TAKE 1 TABLET BY MOUTH DAILY ALONG WITH 5MG TAB. 90 tablet 1     amLODIPine (NORVASC) 5 MG tablet TAKE 1 TABLET BY MOUTH ONCE DAILY. 90 tablet 0     Ascorbic Acid (VITAMIN C PO) Daily       colchicine (COLCYRS) 0.6 MG tablet Take 1 tablet (0.6 mg) by mouth daily 30 tablet 0     Ferrous Gluconate 240 (27 Fe) MG TABS Take 1 mg by mouth daily       fish oil-omega-3 fatty acids 1000 MG capsule Take 1 g by mouth daily       lisinopril (PRINIVIL/ZESTRIL) 20 MG tablet Take 1 tablet (20 mg) by mouth daily 90 tablet 1     mirtazapine (REMERON) 15 MG tablet Take 1 tablet (15 mg) by mouth At Bedtime 30 tablet 5     Multiple Vitamin (DAILY MULTIVITAMIN PO) Take 1 tablet by Oral route every day with food       simvastatin (ZOCOR) 20 MG tablet TAKE 1 TABLET BY MOUTH ONCE DAILY. 90 tablet 2       Allergies: Animal dander     Past Medical History:   Diagnosis Date     Depressive disorder, not elsewhere classified 2011     Hypertension, Benign 2011     Hypertrophy (benign) of prostate without urinary o 10/10/2007      "Nonallopathic lesion of cervical region, not elsewhere classified 08/27/2008     Pure hypercholesterolemia 12/29/1999     RBBB 02/22/2011       Past Surgical History:   Procedure Laterality Date     COLONOSCOPY  03-    repeat 10 yrs     COLONOSCOPY  2005     HERNIA REPAIR       left arthroscopy       RELEASE CARPAL TUNNEL      RT     TONSILLECTOMY       TRANSPOSITION ULNAR NERVE (ELBOW) Left 11/19/2015    Procedure: TRANSPOSITION ULNAR NERVE (ELBOW);  Surgeon: Mahesh Capps MD;  Location: [facility]       ENT family history reviewed    Social History     Tobacco Use     Smoking status: Former Smoker     Types: Cigarettes     Smokeless tobacco: Former User     Tobacco comment: no passive exposure, tried to quit-yes   Substance Use Topics     Alcohol use: Yes     Comment: 2 glasses, daily, yesterday     Drug use: No       Review of Systems  ROS: 10 point ROS neg other than the symptoms noted above in the HPI     Physical Exam    /72   Pulse 52   Temp 96.4  F (35.8  C) (Tympanic)   Ht 1.676 m (5' 6\")   Wt 95.3 kg (210 lb)   SpO2 96%   BMI 33.89 kg/m       General - The patient is well nourished and well developed, and appears to have good nutritional status.  Alert and oriented to person and place, answers questions and cooperates with examination appropriately.   Head and Face - Normocephalic and atraumatic, with no gross asymmetry noted.  The facial nerve is intact, with strong symmetric movements.  Voice and Breathing - The patient was breathing comfortably without the use of accessory muscles. There was no wheezing, stridor, or stertor.  The patients voice was clear and strong, and had appropriate pitch and quality.  Ears -The external auditory canals- right ear with cerumen. Cerumen removed with cupped forceps.   the tympanic membranes are intact without effusion, retraction or mass.  Bony landmarks are intact.  Right upper auricle tubercle- small 5 mm lesion. Lesions appears with crusting. "   Eyes - Extraocular movements intact, and the pupils were reactive to light.  Sclera were not icteric or injected, conjunctiva were pink and moist.  Mouth - Examination of the oral cavity showed pink, healthy oral mucosa. No lesions or ulcerations noted.  The tongue was mobile and midline, and the dentition were in good condition.    Throat - The walls of the oropharynx were smooth, pink, moist, symmetric, and had no lesions or ulcerations.  The tonsillar pillars and soft palate were symmetric.  The uvula was midline on elevation.    Neck - Normal midline excursion of the laryngotracheal complex during swallowing.  Full range of motion on passive movement.  Palpation of the occipital, submental, submandibular, internal jugular chain, and supraclavicular nodes did not demonstrate any abnormal lymph nodes or masses.  Palpation of the thyroid was soft and smooth, with no nodules or goiter appreciated.  The trachea was mobile and midline.  Nose - External contour is symmetric, no gross deflection or scars.  Nasal mucosa is pink and moist with no abnormal mucus.  The septum and turbinates were evaluated:   No polyps, masses, or purulence noted on examination.    Patient and I signed consent for shave bx.  Shave bx in typical fashion .  Area cleaned with betadyne and anesthetized with 1% lidocaine with epi . Then a #15 blade used to remove an area of her lesion and sent to pathology.  Bleeding was cauterized. Pt tolerated procedure well.  Site was cleansed and Bacitracin placed.     ASSESSMENT:      ICD-10-CM    1. Excessive cerumen in ear canal, right H61.21    2. Lesion of skin of right ear H61.91      Lesion was excised with shave biopsy. He tolerated well.   Return PRN pathology report.     This patient had a skin lesion excised today.  I have instructed the patient on wound care and signs of infection.  Written instructions provided.  We will contact the patient with pathology results.    If any additional surgery  needs to be scheduled we will discuss this after pathology results are finalized.     No soaking of the wound for 3 weeks, may shower.   Sunscreen use and skin cancer preventive measures discussed           Jory Patino PA-C  [facility], [place]  [phone]      Again, thank you for allowing me to participate in the care of your patient.        Sincerely,        Jory Patino PA-C

## 2020-01-17 LAB — COPATH REPORT: NORMAL

## 2020-02-17 DIAGNOSIS — I10 ESSENTIAL HYPERTENSION, BENIGN: ICD-10-CM

## 2020-02-19 NOTE — TELEPHONE ENCOUNTER
Amlodipine  Last Written Prescription Date: 11/15/19  Last Fill Quantity: 90 # of Refills: 0  Last Office Visit: 11/25/19

## 2020-02-20 RX ORDER — AMLODIPINE BESYLATE 5 MG/1
TABLET ORAL
Qty: 90 TABLET | Refills: 0 | Status: SHIPPED | OUTPATIENT
Start: 2020-02-20 | End: 2020-05-12

## 2020-05-07 DIAGNOSIS — I10 ESSENTIAL HYPERTENSION, BENIGN: ICD-10-CM

## 2020-05-07 RX ORDER — LISINOPRIL 20 MG/1
TABLET ORAL
Qty: 90 TABLET | Refills: 0 | Status: SHIPPED | OUTPATIENT
Start: 2020-05-07 | End: 2020-08-12

## 2020-05-26 ENCOUNTER — TELEPHONE (OUTPATIENT)
Dept: FAMILY MEDICINE | Facility: OTHER | Age: 70
End: 2020-05-26

## 2020-05-26 NOTE — TELEPHONE ENCOUNTER
Patient was called and advised he has a prescription of lisinopril at the pharmacy Patient stated he did not know he had a prescription and will go pick it up.

## 2020-05-26 NOTE — TELEPHONE ENCOUNTER
Home care nurse called and stated that patient is late to fill his lisinopril due on May 10th and has not picked up his medication and has a refill. Nurse wanted to let PCP know was concerned there is a non adherence to the medication or possibly unsure if patient is able to  medication. Nurse does not need a call back just requesting to advise PCP.

## 2020-06-16 ENCOUNTER — TELEPHONE (OUTPATIENT)
Dept: FAMILY MEDICINE | Facility: OTHER | Age: 70
End: 2020-06-16

## 2020-06-16 DIAGNOSIS — Z29.8 * * * SBE PROPHYLAXIS * * *: Primary | ICD-10-CM

## 2020-06-16 RX ORDER — AMOXICILLIN 500 MG/1
CAPSULE ORAL
Qty: 4 CAPSULE | Refills: 0 | Status: SHIPPED | OUTPATIENT
Start: 2020-06-16 | End: 2022-03-23

## 2020-06-16 NOTE — TELEPHONE ENCOUNTER
Patient called and stated he had his hip replaced and is having dental work tomorrow and wanting them to sent to [facility]. Please advise

## 2020-07-23 ENCOUNTER — OFFICE VISIT (OUTPATIENT)
Dept: FAMILY MEDICINE | Facility: OTHER | Age: 70
End: 2020-07-23
Attending: FAMILY MEDICINE
Payer: COMMERCIAL

## 2020-07-23 ENCOUNTER — NURSE TRIAGE (OUTPATIENT)
Dept: FAMILY MEDICINE | Facility: OTHER | Age: 70
End: 2020-07-23

## 2020-07-23 DIAGNOSIS — Z20.822 EXPOSURE TO COVID-19 VIRUS: Primary | ICD-10-CM

## 2020-07-23 PROCEDURE — U0003 INFECTIOUS AGENT DETECTION BY NUCLEIC ACID (DNA OR RNA); SEVERE ACUTE RESPIRATORY SYNDROME CORONAVIRUS 2 (SARS-COV-2) (CORONAVIRUS DISEASE [COVID-19]), AMPLIFIED PROBE TECHNIQUE, MAKING USE OF HIGH THROUGHPUT TECHNOLOGIES AS DESCRIBED BY CMS-2020-01-R: HCPCS | Mod: ZL | Performed by: FAMILY MEDICINE

## 2020-07-23 NOTE — TELEPHONE ENCOUNTER
"Discharge Instructions for COVID-19 Patients  You have--or may have--COVID-19. Please follow the instructions listed below.   If you have a weakened immune system, discuss with your doctor any other actions you need to take.  How can I protect others?  If you have symptoms (fever, cough, body aches or trouble breathing):    Stay home and away from others (self-isolate) until:  ? At least 10 days have passed since your symptoms started. And   ? You've had no fever--and no medicine that reduces fever--for 3 full days (72 hours). And   ? Your other symptoms have resolved (gotten better).  If you don't show symptoms, but testing showed that you have COVID-19:    Stay home and away from others (self-isolate) until at least 10 days have passed since the date of your first positive COVID-19 test.  During this time    Stay in your own room, even for meals. Use your own bathroom if you can.    Stay away from others in your home. No hugging, kissing or shaking hands. No visitors.    Don't go to work, school or anywhere else.    Clean \"high touch\" surfaces often (doorknobs, counters, handles). Use household cleaning spray or wipes. You'll find a full list of  on the EPA website: www.epa.gov/pesticide-registration/list-n-disinfectants-use-against-sars-cov-2.    Cover your mouth and nose with a mask, tissue or wash cloth to avoid spreading germs.    Wash your hands and face often. Use soap and water.    Caregivers in these groups are at risk for severe illness due to COVID-19:  ? People 65 years and older  ? People who live in a nursing home or long-term care facility  ? People with chronic disease (lung, heart, cancer, diabetes, kidney, liver, immunologic)  ? People who have a weakened immune system, including those who:    Are in cancer treatment    Take medicine that weakens the immune system, such as corticosteroids    Had a bone marrow or organ transplant    Have an immune deficiency    Have poorly controlled HIV or " AIDS    Are obese (body mass index of 40 or higher)    Smoke regularly    Caregivers should wear gloves while washing dishes, handling laundry and cleaning bedrooms and bathrooms.    Use caution when washing and drying laundry: Don't shake dirty laundry and use the warmest water setting that you can.    For more tips on managing your health at home, go to www.cdc.gov/coronavirus/2019-ncov/downloads/10Things.pdf.  How can I take care of myself at home?  1. Get lots of rest. Drink extra fluids (unless a doctor has told you not to).  2. Take Tylenol (acetaminophen) for fever or pain. If you have liver or kidney problems, ask your family doctor if it's okay to take Tylenol.     Adults can take either:  ? 650 mg (two 325 mg pills) every 4 to 6 hours, or   ? 1,000 mg (two 500 mg pills) every 8 hours as needed.  ? Note: Don't take more than 3,000 mg in one day. Acetaminophen is found in many medicines (both prescribed and over-the-counter medicines). Read all labels to be sure you don't take too much.   For children, check the Tylenol bottle for the right dose. The dose is based on the child's age or weight.  3. If you have other health problems (like cancer, heart failure, an organ transplant or severe kidney disease): Call your specialty clinic if you don't feel better in the next 2 days.  4. Know when to call 911. Emergency warning signs include:  ? Trouble breathing or shortness of breath  ? Pain or pressure in the chest that doesn't go away  ? Feeling confused like you haven't felt before, or not being able to wake up  ? Bluish-colored lips or face  5. Your doctor may have prescribed a blood thinner medicine. Follow their instructions.  Where can I get more information?     [facility] - About COVID-19:   [email or web address]    CDC - What to Do If You're Sick: www.cdc.gov/coronavirus/2019-ncov/about/steps-when-sick.html    CDC - Ending Home Isolation:  www.cdc.gov/coronavirus/2019-ncov/hcp/disposition-in-home-patients.html    CDC - Caring for Someone: www.cdc.gov/coronavirus/2019-ncov/if-you-are-sick/care-for-someone.html    [facility] - Interim Guidance for Hospital Discharge to Home: www.health.[facility].[place]/diseases/coronavirus/hcp/hospdischarge.pdf    [facility] clinical trials (COVID-19 research studies): clinicalaffairs.[facility]-clinical-trials    Below are the COVID-19 hotlines at the [place] Department of Health ([facility]). Interpreters are available.  ? For health questions: Call 953-378-2209 or 1-165.724.7959 (7 a.m. to 7 p.m.)  ? For questions about schools and childcare: Call 709-552-7702 or 1-760.689.3756 (7 a.m. to 7 p.m.)    For informational purposes only. Not to replace the advice of your health care provider. Clinically reviewed by the Infection Prevention Team.Copyright   2020 [facility]. All rights reserved. [place] [id number] - 06/20.  COVID 19 Nurse Triage Plan/Patient Instructions    Please be aware that novel coronavirus (COVID-19) may be circulating in the community. If you develop symptoms such as fever, cough, or SOB or if you have concerns about the presence of another infection including coronavirus (COVID-19), please contact your health care provider or visit [email or web address].     Disposition/Instructions    Home care recommended. Follow home care protocol based instructions.    Thank you for taking steps to prevent the spread of this virus.  o Limit your contact with others.  o Wear a simple mask to cover your cough.  o Wash your hands well and often.    Resources    [facility]: About COVID-19: [email or web address]    CDC: What to Do If You're Sick: www.cdc.gov/coronavirus/2019-ncov/about/steps-when-sick.html    CDC: Ending Home Isolation: www.cdc.gov/coronavirus/2019-ncov/hcp/disposition-in-home-patients.html     CDC: Caring for Someone:  www.cdc.gov/coronavirus/2019-ncov/if-you-are-sick/care-for-someone.html     [facility]: Interim Guidance for Hospital Discharge to Home: www.health.[facility].[place]/diseases/coronavirus/hcp/hospdischarge.pdf    [facility] clinical trials (COVID-19 research studies): clinicalaffairs.[facility]-clinical-trials     Below are the COVID-19 hotlines at the [place] Department of Health ([facility]). Interpreters are available.   o For health questions: Call 836-093-7983 or 1-912.214.2303 (7 a.m. to 7 p.m.)  o For questions about schools and childcare: Call 778-685-7976 or 1-511.615.4728 (7 a.m. to 7 p.m.)                       Reason for Disposition    [1] COVID-19 EXPOSURE (Close Contact) AND [2] within last 14 days BUT [3] NO symptoms    Additional Information    Negative: COVID-19 has been diagnosed by a healthcare provider (HCP)    Negative: COVID-19 lab test positive    Negative: [1] Symptoms of COVID-19 (e.g., cough, fever, SOB, or others) AND [2] lives in an area with community spread    Negative: [1] Symptoms of COVID-19 (e.g., cough, fever, SOB, or others) AND [2] within 14 days of EXPOSURE (close contact) with diagnosed or suspected COVID-19 patient    Negative: [1] Symptoms of COVID-19 (e.g., cough, fever, SOB, or others) AND [2] within 14 days of travel from high-risk area for COVID-19 community spread (identified by CDC)    Negative: [1] Difficulty breathing (shortness of breath) occurs AND [2] onset > 14 days after COVID-19 EXPOSURE (Close Contact) AND [3] no community spread where patient lives    Negative: [1] Dry cough occurs AND [2] onset > 14 days after COVID-19 EXPOSURE AND [3] no community spread where patient lives    Negative: [1] Wet cough (i.e., white-yellow, yellow, green, or graham colored sputum) AND [2] onset > 14 days after COVID-19 EXPOSURE AND [3] no community spread where patient lives    Negative: [1] Common cold symptoms AND [2] onset > 14 days after COVID-19 EXPOSURE AND [3] no community  "spread where patient lives    Negative: [1] COVID-19 EXPOSURE (Close Contact) within last 14 days AND [2] needs COVID-19 lab test to return to work AND [3] NO symptoms    Negative: [1] COVID-19 EXPOSURE (Close Contact) within last 14 days AND [2] exposed person is a healthcare worker who was NOT using all recommended personal protective equipment (i.e., a respirator-N95 mask, eye protection, gloves, and gown) AND [3] NO symptoms    Answer Assessment - Initial Assessment Questions  1. CLOSE CONTACT: \"Who is the person with the confirmed or suspected COVID-19 infection that you were exposed to?\"      friend at a party 7/18/20  2. PLACE of CONTACT: \"Where were you when you were exposed to COVID-19?\" (e.g., home, school, medical waiting room; which city?)      home  3. TYPE of CONTACT: \"How much contact was there?\" (e.g., sitting next to, live in same house, work in same office, same building)      Sitting and visiting  4. DURATION of CONTACT: \"How long were you in contact with the COVID-19 patient?\" (e.g., a few seconds, passed by person, a few minutes, live with the patient)      hours  5. DATE of CONTACT: \"When did you have contact with a COVID-19 patient?\" (e.g., how many days ago)      7/18/20  6. TRAVEL: \"Have you traveled out of the country recently?\" If so, \"When and where?\"      * Also ask about out-of-state travel, since the CDC has identified some high-risk cities for community spread in the .      * Note: Travel becomes less relevant if there is widespread community transmission where the patient lives.      no  7. COMMUNITY SPREAD: \"Are there lots of cases of COVID-19 (community spread) where you live?\" (See public health department website, if unsure)        minor  8. SYMPTOMS: \"Do you have any symptoms?\" (e.g., fever, cough, breathing difficulty)      lew  9. PREGNANCY OR POSTPARTUM: \"Is there any chance you are pregnant?\" \"When was your last menstrual period?\" \"Did you deliver in the last 2 weeks?\"   " "   n/a  10. HIGH RISK: \"Do you have any heart or lung problems? Do you have a weak immune system?\" (e.g., CHF, COPD, asthma, HIV positive, chemotherapy, renal failure, diabetes mellitus, sickle cell anemia)        denies    Protocols used: CORONAVIRUS (COVID-19) EXPOSURE-A- 5.16.20      "

## 2020-07-25 LAB
SARS-COV-2 RNA SPEC QL NAA+PROBE: NOT DETECTED
SPECIMEN SOURCE: NORMAL

## 2020-07-27 ENCOUNTER — TELEPHONE (OUTPATIENT)
Dept: FAMILY MEDICINE | Facility: OTHER | Age: 70
End: 2020-07-27

## 2020-07-27 NOTE — TELEPHONE ENCOUNTER
Call from patient inquiring on COVID Results. Notified results are negative. Due to exposure patient notified to continue to self isolate/quarantine for 14 days from date of exposure.

## 2020-08-11 DIAGNOSIS — I10 ESSENTIAL HYPERTENSION, BENIGN: ICD-10-CM

## 2020-08-12 RX ORDER — LISINOPRIL 20 MG/1
TABLET ORAL
Qty: 90 TABLET | Refills: 0 | Status: SHIPPED | OUTPATIENT
Start: 2020-08-12 | End: 2020-11-12

## 2020-08-12 NOTE — TELEPHONE ENCOUNTER
Lisinopril  Last Written Prescription Date: 5/7/20  Last Fill Quantity: 90 # of Refills: 0  Last Office Visit: 11/25/19

## 2020-08-20 DIAGNOSIS — I10 ESSENTIAL HYPERTENSION, BENIGN: ICD-10-CM

## 2020-08-20 RX ORDER — AMLODIPINE BESYLATE 2.5 MG/1
TABLET ORAL
Qty: 90 TABLET | Refills: 0 | Status: SHIPPED | OUTPATIENT
Start: 2020-08-20 | End: 2020-11-19

## 2020-08-20 RX ORDER — AMLODIPINE BESYLATE 5 MG/1
TABLET ORAL
Qty: 90 TABLET | Refills: 0 | Status: SHIPPED | OUTPATIENT
Start: 2020-08-20 | End: 2020-11-19

## 2020-08-20 NOTE — TELEPHONE ENCOUNTER
Norvasc 5mg      Last Written Prescription Date:  5/12/2020  Last Fill Quantity: 90,   # refills: 0  Last Office Visit: 11/25/2019      Norvasc 2.5mg      Last Written Prescription Date:  5/12/2020  Last Fill Quantity: 90,   # refills: 0  Last Office Visit: 11/25/2019  Future Office visit:

## 2020-08-25 ENCOUNTER — OFFICE VISIT (OUTPATIENT)
Dept: FAMILY MEDICINE | Facility: OTHER | Age: 70
End: 2020-08-25
Attending: FAMILY MEDICINE
Payer: COMMERCIAL

## 2020-08-25 VITALS
DIASTOLIC BLOOD PRESSURE: 62 MMHG | OXYGEN SATURATION: 96 % | SYSTOLIC BLOOD PRESSURE: 124 MMHG | BODY MASS INDEX: 32.02 KG/M2 | HEIGHT: 67 IN | TEMPERATURE: 97 F | WEIGHT: 204 LBS | HEART RATE: 66 BPM

## 2020-08-25 DIAGNOSIS — M62.830 BACK MUSCLE SPASM: Primary | ICD-10-CM

## 2020-08-25 PROCEDURE — G0463 HOSPITAL OUTPT CLINIC VISIT: HCPCS

## 2020-08-25 PROCEDURE — 99213 OFFICE O/P EST LOW 20 MIN: CPT | Performed by: FAMILY MEDICINE

## 2020-08-25 RX ORDER — CYCLOBENZAPRINE HCL 10 MG
10 TABLET ORAL 3 TIMES DAILY PRN
Qty: 30 TABLET | Refills: 0 | Status: SHIPPED | OUTPATIENT
Start: 2020-08-25 | End: 2021-04-28

## 2020-08-25 ASSESSMENT — MIFFLIN-ST. JEOR: SCORE: 1638.97

## 2020-08-25 ASSESSMENT — PAIN SCALES - GENERAL: PAINLEVEL: SEVERE PAIN (7)

## 2020-08-25 NOTE — PROGRESS NOTES
Subjective     Oswald Goel is a 69 year old male who presents to clinic today for the following health issues:    HPI     Musculoskeletal problem/pain      Duration: 5 days    Description  Location: low back left side    Intensity:  moderate    Accompanying signs and symptoms: none    History  Previous similar problem: YES  Previous evaluation:  x-ray and MRI    Precipitating or alleviating factors:  Trauma or overuse: YES  Aggravating factors include: sitting and laying down, bending    Therapies tried and outcome: stretching, acetaminophen and deep heating rub     [facility]    Oswald Goel, 69 year old, male presents with   Chief Complaint   Patient presents with     Musculoskeletal Problem       PAST MEDICAL HISTORY:  Past Medical History:   Diagnosis Date     Depressive disorder, not elsewhere classified 02/22/2011     Hypertension, Benign 02/22/2011     Hypertrophy (benign) of prostate without urinary o 10/10/2007     Nonallopathic lesion of cervical region, not elsewhere classified 08/27/2008     Pure hypercholesterolemia 12/29/1999     RBBB 02/22/2011       PAST SURGICAL HISTORY:  Past Surgical History:   Procedure Laterality Date     COLONOSCOPY  03-    repeat 10 yrs     COLONOSCOPY  2005     HERNIA REPAIR       left arthroscopy       RELEASE CARPAL TUNNEL      RT     TONSILLECTOMY       TRANSPOSITION ULNAR NERVE (ELBOW) Left 11/19/2015    Procedure: TRANSPOSITION ULNAR NERVE (ELBOW);  Surgeon: Mahesh Capps MD;  Location: [facility]       MEDICATIONS:  Prior to Admission medications    Medication Sig Start Date End Date Taking? Authorizing Provider   allopurinol (ZYLOPRIM) 100 MG tablet Take 2 tablets (200 mg) by mouth daily 11/25/19  Yes ALDEN Lowery MD   amLODIPine (NORVASC) 2.5 MG tablet TAKE 1 TABLET BY MOUTH DAILY ALONG WITH 5MG TAB. 8/20/20  Yes ALDEN Lowery MD   amLODIPine (NORVASC) 5 MG tablet TAKE 1 TABLET BY MOUTH ONCE DAILY. 8/20/20  Yes ALDEN Lowery MD  "  Ascorbic Acid (VITAMIN C PO) Daily   Yes Reported, Patient   colchicine (COLCYRS) 0.6 MG tablet Take 1 tablet (0.6 mg) by mouth daily 11/25/19  Yes ALDEN Lowery MD   cyclobenzaprine (FLEXERIL) 10 MG tablet Take 1 tablet (10 mg) by mouth 3 times daily as needed for muscle spasms 8/25/20  Yes ALDEN Lowery MD   Ferrous Gluconate 240 (27 Fe) MG TABS Take 1 mg by mouth daily   Yes Reported, Patient   fish oil-omega-3 fatty acids 1000 MG capsule Take 1 g by mouth daily   Yes Reported, Patient   lisinopril (ZESTRIL) 20 MG tablet TAKE 1 TABLET BY MOUTH ONCE DAILY. 8/12/20  Yes ALDEN Lowery MD   mirtazapine (REMERON) 15 MG tablet Take 1 tablet (15 mg) by mouth At Bedtime 11/25/19  Yes ALDEN Lowery MD   Multiple Vitamin (DAILY MULTIVITAMIN PO) Take 1 tablet by Oral route every day with food   Yes Reported, Patient   simvastatin (ZOCOR) 20 MG tablet TAKE 1 TABLET BY MOUTH ONCE DAILY. 4/27/20  Yes ALDEN Lowery MD   amoxicillin (AMOXIL) 500 MG capsule Take 4 capsules 30-60 minutes before procedure  Patient not taking: Reported on 8/25/2020 6/16/20   ALDEN Lowery MD       ALLERGIES:     Allergies   Allergen Reactions     Animal Dander      Deer hair       ROS:  Constitutional, HEENT, cardiovascular, pulmonary, gi and gu systems are negative, except as otherwise noted.      EXAM:  /62   Pulse 66   Temp 97  F (36.1  C) (Tympanic)   Ht 1.702 m (5' 7\")   Wt 92.5 kg (204 lb)   SpO2 96%   BMI 31.95 kg/m   Body mass index is 31.95 kg/m .   GENERAL APPEARANCE: healthy, alert and no distress  EYES: Eyes grossly normal to inspection, PERRL and conjunctivae and sclerae normal  MS: extremities normal- no gross deformities noted, on his left paraspinal lumbar region has muscle spasm no midline tenderness no SI tenderness and he has normal heel and toe gait  NEURO: Normal strength and tone, mentation intact and speech normal  Lab/ X-ray  No results found for this or any previous visit (from the past 24 " hour(s)).    ASSESSMENT/PLAN:    ICD-10-CM    1. Back muscle spasm  M62.830 cyclobenzaprine (FLEXERIL) 10 MG tablet   Acute back spasm he has been doing a lot of wood splitting over the last few days for now he will rest and try some ibuprofen 200 mg 2 to 3 tablets 3 times a day with food for 5 days stop if he gets GI side effects.  He also will take some ibuprofen.  We will try muscle relaxer for a few days.  He can try hot and cold packs as needed.  We will get him into see his chiropractor Art LACKEY for further treatment.  Follow-up as needed      ADRI Lowery MD  August 25, 2020

## 2020-08-25 NOTE — NURSING NOTE
"Chief Complaint   Patient presents with     Musculoskeletal Problem       Initial /62   Pulse 66   Temp 97  F (36.1  C) (Tympanic)   Ht 1.702 m (5' 7\")   Wt 92.5 kg (204 lb)   SpO2 96%   BMI 31.95 kg/m   Estimated body mass index is 31.95 kg/m  as calculated from the following:    Height as of this encounter: 1.702 m (5' 7\").    Weight as of this encounter: 92.5 kg (204 lb).  Medication Reconciliation: complete  Keri Tolentino LPN  "

## 2020-08-26 ENCOUNTER — OFFICE VISIT (OUTPATIENT)
Dept: CHIROPRACTIC MEDICINE | Facility: OTHER | Age: 70
End: 2020-08-26
Attending: CHIROPRACTOR
Payer: COMMERCIAL

## 2020-08-26 DIAGNOSIS — M54.50 ACUTE BILATERAL LOW BACK PAIN WITHOUT SCIATICA: ICD-10-CM

## 2020-08-26 DIAGNOSIS — M99.02 SEGMENTAL AND SOMATIC DYSFUNCTION OF THORACIC REGION: ICD-10-CM

## 2020-08-26 DIAGNOSIS — M99.03 SEGMENTAL AND SOMATIC DYSFUNCTION OF LUMBAR REGION: Primary | ICD-10-CM

## 2020-08-26 PROCEDURE — 98940 CHIROPRACT MANJ 1-2 REGIONS: CPT | Mod: AT | Performed by: CHIROPRACTOR

## 2020-08-26 NOTE — PROGRESS NOTES
Subjective Finding:    Chief compalint: Patient presents with:  Back Pain  , Pain Scale: 8/10, Intensity: sharp, Duration: 4 days, Radiating: .    Date of injury:     Activities that the pain restricts:   Home/household/hobbies/social activities: yes.  Work duties: yes.  Sleep: yes.  Makes symptoms better: rest.  Makes symptoms worse: activity and walking.  Have you seen anyone else for the symptoms? no.  Work related: no.  Automobile related injury: no.    Objective and Assessment:    Posture Analysis:   High shoulder: .  Head tilt: .  High iliac crest: right.  Head carriage: neutral.  Thoracic Kyphosis: neutral.  Lumbar Lordosis: forward.    Lumbar Range of Motion: .  Cervical Range of Motion:   C spine restricted .  Thoracic Range of Motion: .  Extremity Range of Motion: .          Segmental dysfunction pre-treatment and treatment area:  L5  T2.    Assessment post-treatment:  Cervical: .  Thoracic: ROM increased.  Lumbar: ROM increased and pain and tenderness decreased.    Comments: .      Complicating Factors: .    Plan / Procedure:    Treatment plan: PRN.  Instructed patient: ice 20 minutes every other hour as needed and stretch as instructed at visit.  Short term goals: reduce pain and increase ROM.  Long term goals: restore normal function.  Prognosis: very good.

## 2020-11-06 DIAGNOSIS — E78.00 PURE HYPERCHOLESTEROLEMIA: ICD-10-CM

## 2020-11-06 NOTE — TELEPHONE ENCOUNTER
simvastatin      Last Written Prescription Date:  4/27/20  Last Fill Quantity: 90,   # refills: 1  Last Office Visit: 8/25/20  Future Office visit:

## 2020-11-07 RX ORDER — SIMVASTATIN 20 MG
TABLET ORAL
Qty: 90 TABLET | Refills: 0 | Status: SHIPPED | OUTPATIENT
Start: 2020-11-07 | End: 2021-02-01

## 2020-11-12 DIAGNOSIS — I10 ESSENTIAL HYPERTENSION, BENIGN: ICD-10-CM

## 2020-11-12 RX ORDER — LISINOPRIL 20 MG/1
TABLET ORAL
Qty: 90 TABLET | Refills: 0 | Status: SHIPPED | OUTPATIENT
Start: 2020-11-12 | End: 2021-02-16

## 2020-11-12 NOTE — TELEPHONE ENCOUNTER
lisinopril      Last Written Prescription Date:  08/12/20  Last Fill Quantity: 90,   # refills: 0  Last Office Visit: 07/27/20

## 2020-11-18 ENCOUNTER — TELEPHONE (OUTPATIENT)
Dept: FAMILY MEDICINE | Facility: OTHER | Age: 70
End: 2020-11-18

## 2020-11-18 DIAGNOSIS — I10 ESSENTIAL HYPERTENSION, BENIGN: ICD-10-CM

## 2020-11-19 RX ORDER — AMLODIPINE BESYLATE 2.5 MG/1
TABLET ORAL
Qty: 90 TABLET | Refills: 3 | Status: SHIPPED | OUTPATIENT
Start: 2020-11-19 | End: 2021-12-02

## 2020-11-19 RX ORDER — AMLODIPINE BESYLATE 5 MG/1
5 TABLET ORAL DAILY
Qty: 90 TABLET | Refills: 3 | Status: SHIPPED | OUTPATIENT
Start: 2020-11-19 | End: 2021-12-02

## 2020-11-19 RX ORDER — AMLODIPINE BESYLATE 2.5 MG/1
TABLET ORAL
Qty: 90 TABLET | Refills: 0 | Status: SHIPPED | OUTPATIENT
Start: 2020-11-19 | End: 2020-11-19

## 2020-11-19 RX ORDER — AMLODIPINE BESYLATE 5 MG/1
TABLET ORAL
Qty: 90 TABLET | Refills: 0 | Status: SHIPPED | OUTPATIENT
Start: 2020-11-19 | End: 2021-03-11

## 2020-11-19 NOTE — TELEPHONE ENCOUNTER
amLODIPine (NORVASC) 2.5 MG tablet      Last Written Prescription Date:  11/19/20  Last Fill Quantity: 90,   # refills: 3  Last Office Visit: 8/25/2020        amLODIPine (NORVASC) 5 MG tablet      Last Written Prescription Date:  11/19/20  Last Fill Quantity: 90,   # refills: 3  Last Office Visit: 8/25/2020

## 2020-11-19 NOTE — TELEPHONE ENCOUNTER
Amlodipine 2.5 mg      Last Written Prescription Date:  11/19/20  Last Fill Quantity: 90,   # refills: 0  Last Office Visit: 8/25/20  Future Office visit:       Routing refill request to provider for review/approval because:      Amlodipine 5 mg      Last Written Prescription Date:  11/19/20  Last Fill Quantity: 90,   # refills: 0  Last Office Visit: 8/25/20  Future Office visit:       Routing refill request to provider for review/approval because:

## 2020-11-19 NOTE — TELEPHONE ENCOUNTER
Amlodipine 2.5mg      Last Written Prescription Date:  8-  Last Fill Quantity: 90,   # refills: 0  Last Office Visit: 8-  Future Office visit:         Amlodipine 5mg      Last Written Prescription Date:  8-  Last Fill Quantity: 90,   # refills: 0  Last Office Visit: 8-  Future Office visit:

## 2021-02-01 DIAGNOSIS — E78.00 PURE HYPERCHOLESTEROLEMIA: ICD-10-CM

## 2021-02-01 RX ORDER — SIMVASTATIN 20 MG
TABLET ORAL
Qty: 90 TABLET | Refills: 0 | Status: SHIPPED | OUTPATIENT
Start: 2021-02-01 | End: 2021-03-16 | Stop reason: DRUGHIGH

## 2021-02-01 NOTE — TELEPHONE ENCOUNTER
simvastatin (ZOCOR) 20 MG tablet  Last Written Prescription Date:  11/7/20  Last Fill Quantity: 90,   # refills: 0  Last Office Visit: 8/25/20  Future Office visit:       Routing refill request to provider for review/approval

## 2021-02-16 DIAGNOSIS — I10 ESSENTIAL HYPERTENSION, BENIGN: ICD-10-CM

## 2021-02-16 RX ORDER — LISINOPRIL 20 MG/1
TABLET ORAL
Qty: 90 TABLET | Refills: 0 | Status: SHIPPED | OUTPATIENT
Start: 2021-02-16 | End: 2021-05-11

## 2021-02-16 NOTE — TELEPHONE ENCOUNTER
ACE Inhibitors (Including Combos) Protocol [id number]/16/2021 09:00 AM   Normal serum creatinine on file in past 12 months Protocol Details    Normal serum potassium on file in past 12 months      Zestril    Last Written Prescription Date:  11.12.2020  Last Fill Quantity: 90,   # refills: 0  Last Office Visit: 8.25.2020  Future Office visit:    Next 5 appointments (look out 90 days)    Mar 11, 2021  9:00 AM  (Arrive by 8:45 AM)  PHYSICAL with ALDEN Lowery MD  [facility] ([facility] ) [address]  [place] [address]  [phone]           Routing refill request to provider for review/approval because:  See above.      Marcella Shields RN

## 2021-03-05 ENCOUNTER — IMMUNIZATION (OUTPATIENT)
Dept: FAMILY MEDICINE | Facility: OTHER | Age: 71
End: 2021-03-05
Attending: FAMILY MEDICINE
Payer: COMMERCIAL

## 2021-03-05 PROCEDURE — 91300 PR COVID VAC PFIZER DIL RECON 30 MCG/0.3 ML IM: CPT

## 2021-03-11 ENCOUNTER — OFFICE VISIT (OUTPATIENT)
Dept: FAMILY MEDICINE | Facility: OTHER | Age: 71
End: 2021-03-11
Attending: FAMILY MEDICINE
Payer: COMMERCIAL

## 2021-03-11 ENCOUNTER — TELEPHONE (OUTPATIENT)
Dept: FAMILY MEDICINE | Facility: OTHER | Age: 71
End: 2021-03-11

## 2021-03-11 ENCOUNTER — ANCILLARY PROCEDURE (OUTPATIENT)
Dept: GENERAL RADIOLOGY | Facility: OTHER | Age: 71
End: 2021-03-11
Attending: FAMILY MEDICINE
Payer: COMMERCIAL

## 2021-03-11 VITALS
BODY MASS INDEX: 33.82 KG/M2 | OXYGEN SATURATION: 97 % | SYSTOLIC BLOOD PRESSURE: 130 MMHG | WEIGHT: 210.4 LBS | DIASTOLIC BLOOD PRESSURE: 72 MMHG | HEART RATE: 63 BPM | TEMPERATURE: 97 F | HEIGHT: 66 IN

## 2021-03-11 DIAGNOSIS — R06.09 DYSPNEA ON EXERTION: ICD-10-CM

## 2021-03-11 DIAGNOSIS — Z12.11 SPECIAL SCREENING FOR MALIGNANT NEOPLASMS, COLON: ICD-10-CM

## 2021-03-11 DIAGNOSIS — Z12.5 SCREENING FOR PROSTATE CANCER: ICD-10-CM

## 2021-03-11 DIAGNOSIS — I51.7 ENLARGED HEART: Primary | ICD-10-CM

## 2021-03-11 DIAGNOSIS — Z00.00 ENCOUNTER FOR MEDICARE ANNUAL WELLNESS EXAM: ICD-10-CM

## 2021-03-11 DIAGNOSIS — Z00.00 ROUTINE GENERAL MEDICAL EXAMINATION AT A HEALTH CARE FACILITY: Primary | ICD-10-CM

## 2021-03-11 DIAGNOSIS — E78.00 PURE HYPERCHOLESTEROLEMIA: ICD-10-CM

## 2021-03-11 DIAGNOSIS — I10 ESSENTIAL HYPERTENSION, BENIGN: ICD-10-CM

## 2021-03-11 PROCEDURE — G0463 HOSPITAL OUTPT CLINIC VISIT: HCPCS

## 2021-03-11 PROCEDURE — G0463 HOSPITAL OUTPT CLINIC VISIT: HCPCS | Mod: 25

## 2021-03-11 PROCEDURE — G0438 PPPS, INITIAL VISIT: HCPCS | Performed by: FAMILY MEDICINE

## 2021-03-11 PROCEDURE — 71046 X-RAY EXAM CHEST 2 VIEWS: CPT | Mod: TC

## 2021-03-11 RX ORDER — CALCIUM/MAGNESIUM/ZINC 333-133 MG
TABLET ORAL
COMMUNITY

## 2021-03-11 RX ORDER — ACETAMINOPHEN 500 MG
500-1000 TABLET ORAL EVERY 6 HOURS PRN
COMMUNITY

## 2021-03-11 RX ORDER — MULTIVIT WITH MINERALS/LUTEIN
1000 TABLET ORAL DAILY
COMMUNITY

## 2021-03-11 RX ORDER — TURMERIC ROOT EXTRACT 500 MG
TABLET ORAL
COMMUNITY
End: 2024-05-07 | Stop reason: SINTOL

## 2021-03-11 ASSESSMENT — PAIN SCALES - GENERAL: PAINLEVEL: NO PAIN (0)

## 2021-03-11 ASSESSMENT — MIFFLIN-ST. JEOR: SCORE: 1647.12

## 2021-03-11 NOTE — PROGRESS NOTES
"  SUBJECTIVE:   Oswald Goel is a 70 year old male who presents for Preventive Visit.  Patient has been advised of split billing requirements and indicates understanding: Yes  Are you in the first 12 months of your Medicare Part B coverage?  No    Physical Health:    In general, how would you rate your overall physical health? fair    Outside of work, how many days during the week do you exercise? 4-5 days/week    Outside of work, approximately how many minutes a day do you exercise?greater than 60 minutes    If you drink alcohol do you typically have >3 drinks per day or >7 drinks per week? No    Do you usually eat at least 4 servings of fruit and vegetables a day, include whole grains & fiber and avoid regularly eating high fat or \"junk\" foods? NO    Do you have any problems taking medications regularly?  No    Do you have any side effects from medications? none    Needs assistance for the following daily activities: no assistance needed    Which of the following safety concerns are present in your home?  none identified     Hearing impairment: No    In the past 6 months, have you been bothered by leaking of urine? no  He is due for colonoscopy.  He would like to get a flu shot he did get it the first Covid vaccine.  He did get Covid disease this fall and since then he has felt short of breath with some physical activities he can do the elliptical without a problem but other times he feels like it is little more difficult to breathe.  No wheezing no lightheadedness no chest pain with activities  Mental Health:    In general, how would you rate your overall mental or emotional health? fair  PHQ-2 Score:      Do you feel safe in your environment? Yes    Have you ever done Advance Care Planning? (For example, a Health Directive, POLST, or a discussion with a medical provider or your loved ones about your wishes): No, advance care planning information given to patient to review.  Advanced care planning was " discussed at today's visit.    Additional concerns to address?  YES- is SOB upon exertion    Fall risk:  Fallen 2 or more times in the past year?: No  Any fall with injury in the past year?: No     Cognitive Screenin) Repeat 3 items (Leader, Season, Table)    2) Clock draw: NORMAL  3) 3 item recall: Recalls 1 object   Results: NORMAL clock, 1-2 items recalled: COGNITIVE IMPAIRMENT LESS LIKELY    Mini-CogTM Copyright TERRY Arce. Licensed by the author for use in [facility]; reprinted with permission (jackie@[facility]). All rights reserved.      Do you have sleep apnea, excessive snoring or daytime drowsiness?: no          Reviewed and updated as needed this visit by clinical staff                 Reviewed and updated as needed this visit by Provider                Social History     Tobacco Use     Smoking status: Former Smoker     Types: Cigarettes     Smokeless tobacco: Former User     Tobacco comment: no passive exposure, tried to quit-yes   Substance Use Topics     Alcohol use: Yes     Comment: 2 glasses, daily, yesterday                           Current providers sharing in care for this patient include:   Patient Care Team:  ALDEN Lowery MD as PCP - General (Family Practice)  ALDEN Lowery MD as Assigned PCP  Jory Patino PA-C as Assigned Surgical Provider    The following health maintenance items are reviewed in Epic and correct as of today:  Health Maintenance   Topic Date Due     ZOSTER IMMUNIZATION (1 of 2) Never done     Pneumococcal Vaccine: 65+ Years (2 of 2 - PPSV23) 2019     MEDICARE ANNUAL WELLNESS VISIT  03/15/2020     DTAP/TDAP/TD IMMUNIZATION (2 - Td) 2020     PHQ-9  2020     INFLUENZA VACCINE (1) 2020     FALL RISK ASSESSMENT  2020     COLORECTAL CANCER SCREENING  2021     COVID-19 Vaccine (2 of 2 - Pfizer series) 2021     ADVANCE CARE PLANNING  08/10/2021     LIPID  03/15/2024     HEPATITIS C SCREENING  Completed     DEPRESSION ACTION  PLAN  Completed     AORTIC ANEURYSM SCREENING (SYSTEM ASSIGNED)  Completed     Pneumococcal Vaccine: Pediatrics (0 to 5 Years) and At-Risk Patients (6 to 64 Years)  Aged Out     IPV IMMUNIZATION  Aged Out     MENINGITIS IMMUNIZATION  Aged Out     HEPATITIS B IMMUNIZATION  Aged Out     BP Readings from Last 3 Encounters:   03/11/21 130/72   08/25/20 124/62   01/15/20 130/72    Wt Readings from Last 3 Encounters:   03/11/21 95.4 kg (210 lb 6.4 oz)   08/25/20 92.5 kg (204 lb)   01/15/20 95.3 kg (210 lb)                  Patient Active Problem List   Diagnosis     Advanced care planning/counseling discussion     Pure hypercholesterolemia     Hypertrophy of prostate without urinary obstruction     Nonallopathic lesion of cervical region     Dysthymic disorder     Essential hypertension, benign     RBBB     Primary localized osteoarthrosis, pelvic region and thigh     ACP (advance care planning)     Past Surgical History:   Procedure Laterality Date     COLONOSCOPY  03-    repeat 10 yrs     COLONOSCOPY  2005     HERNIA REPAIR       left arthroscopy       RELEASE CARPAL TUNNEL      RT     TONSILLECTOMY       TRANSPOSITION ULNAR NERVE (ELBOW) Left 11/19/2015    Procedure: TRANSPOSITION ULNAR NERVE (ELBOW);  Surgeon: Mahesh Capps MD;  Location: [facility]       Social History     Tobacco Use     Smoking status: Former Smoker     Types: Cigarettes     Smokeless tobacco: Former User     Tobacco comment: no passive exposure, tried to quit-yes   Substance Use Topics     Alcohol use: Yes     Comment: 2 glasses, daily, yesterday     Family History   Problem Relation Age of Onset     Cancer Mother         Pancreatic     Heart Disease Mother         Heart Surgery         Current Outpatient Medications   Medication Sig Dispense Refill     acetaminophen (TYLENOL) 500 MG tablet Take 500-1,000 mg by mouth every 6 hours as needed for mild pain       amLODIPine (NORVASC) 2.5 MG tablet Take with the 5 mg tablet 90 tablet 3      "amLODIPine (NORVASC) 5 MG tablet Take 1 tablet (5 mg) by mouth daily take with the 2.5 mg tablet daily 90 tablet 3     Ascorbic Acid (VITAMIN C PO) Daily       fish oil-omega-3 fatty acids 1000 MG capsule Take 1 g by mouth daily       lisinopril (ZESTRIL) 20 MG tablet TAKE 1 TABLET BY MOUTH ONCE DAILY. 90 tablet 0     Milk Thistle-Dand-Fennel-Licor (MILK THISTLE XTRA) CAPS capsule        Multiple Vitamin (DAILY MULTIVITAMIN PO) Take 1 tablet by Oral route every day with food       simvastatin (ZOCOR) 20 MG tablet TAKE 1 TABLET BY MOUTH ONCE DAILY. 90 tablet 0     Turmeric 500 MG TABS        vitamin C (ASCORBIC ACID) 1000 MG TABS Take 1,000 mg by mouth daily       allopurinol (ZYLOPRIM) 100 MG tablet Take 2 tablets (200 mg) by mouth daily (Patient not taking: Reported on 3/11/2021) 60 tablet 5     amoxicillin (AMOXIL) 500 MG capsule Take 4 capsules 30-60 minutes before procedure (Patient not taking: Reported on 8/25/2020) 4 capsule 0     colchicine (COLCYRS) 0.6 MG tablet Take 1 tablet (0.6 mg) by mouth daily (Patient not taking: Reported on 3/11/2021) 30 tablet 0     cyclobenzaprine (FLEXERIL) 10 MG tablet Take 1 tablet (10 mg) by mouth 3 times daily as needed for muscle spasms (Patient not taking: Reported on 3/11/2021) 30 tablet 0     Ferrous Gluconate 240 (27 Fe) MG TABS Take 1 mg by mouth daily       mirtazapine (REMERON) 15 MG tablet Take 1 tablet (15 mg) by mouth At Bedtime (Patient not taking: Reported on 3/11/2021) 30 tablet 5     Allergies   Allergen Reactions     Animal Dander      Deer hair         ROS:  Constitutional, HEENT, cardiovascular, pulmonary, GI, , musculoskeletal, neuro, skin, endocrine and psych systems are negative, except as otherwise noted.    OBJECTIVE:   /72   Pulse 63   Temp 97  F (36.1  C) (Tympanic)   Ht 1.676 m (5' 6\")   Wt 95.4 kg (210 lb 6.4 oz)   SpO2 97%   BMI 33.96 kg/m   Estimated body mass index is 33.96 kg/m  as calculated from the following:    Height as of " "this encounter: 1.676 m (5' 6\").    Weight as of this encounter: 95.4 kg (210 lb 6.4 oz).  EXAM:   GENERAL: healthy, alert and no distress  EYES: Eyes grossly normal to inspection, PERRL and conjunctivae and sclerae normal  HENT: ear canals and TM's normal, nose and mouth without ulcers or lesions  NECK: no adenopathy, no asymmetry, masses, or scars and thyroid normal to palpation  RESP: lungs clear to auscultation - no rales, rhonchi or wheezes  CV: regular rate and rhythm, normal S1 S2, no S3 or S4, no murmur, click or rub, no peripheral edema and peripheral pulses strong  ABDOMEN: soft, nontender, no hepatosplenomegaly, no masses and bowel sounds normal   (male): normal male genitalia without lesions or urethral discharge, no hernia  RECTAL: normal sphincter tone, no rectal masses, prostate normal size, smooth, nontender without nodules or masses  MS: no gross musculoskeletal defects noted, no edema  SKIN: Upper back is asymptomatic sebaceous cyst which is about a centimeter half in diameter but he is not interested in having it excised  NEURO: Normal strength and tone, mentation intact and speech normal  PSYCH: mentation appears normal, affect normal/bright  LYMPH: no cervical, supraclavicular, axillary, or inguinal adenopathy    Diagnostic Test Results:  none     ASSESSMENT / PLAN:       ICD-10-CM    1. Routine general medical examination at a health care facility  Z00.00 CBC with platelets and differential   2. Needs flu shot  Z23 FLUZONE HIGH DOSE 65+  [75792]   3. Pure hypercholesterolemia  E78.00 Lipid Profile (Chol, Trig, HDL, LDL calc)   4. Essential hypertension, benign  I10 Comprehensive metabolic panel (BMP + Alb, Alk Phos, ALT, AST, Total. Bili, TP)   5. Screening for prostate cancer  Z12.5 PSA, screen   6. Special screening for malignant neoplasms, colon  Z12.11 GENERAL SURG ADULT REFERRAL   7. Dyspnea on exertion  R06.00 XR Chest 2 Views     BNP-N terminal pro   8. Encounter for Medicare annual " "wellness exam  Z00.00    He did eat today so come in fasting we will check lipids follow-up hypercholesterolemia and a CMP follow-up hypertension check a PSA screening for prostate cancer and a BNP because of his dyspnea.  Today we will get a chest x-ray because of the dyspnea.  He will be referred to general surgery for colonoscopy is due now.  Does request a flu shot but since he has a second Covid shot coming up he will get that weight a couple weeks and then we can then focus on the flu shot    Patient has been advised of split billing requirements and indicates understanding: Yes    COUNSELING:  Reviewed preventive health counseling, as reflected in patient instructions       Regular exercise       Healthy diet/nutrition       Colon cancer screening       Prostate cancer screening    Estimated body mass index is 31.95 kg/m  as calculated from the following:    Height as of 8/25/20: 1.702 m (5' 7\").    Weight as of 8/25/20: 92.5 kg (204 lb).    Weight management plan: Discussed healthy diet and exercise guidelines    He reports that he has quit smoking. His smoking use included cigarettes. He has quit using smokeless tobacco.    Appropriate preventive services were discussed with this patient, including applicable screening as appropriate for cardiovascular disease, diabetes, osteopenia/osteoporosis, and glaucoma.  As appropriate for age/gender, discussed screening for colorectal cancer, prostate cancer, breast cancer, and cervical cancer. Checklist reviewing preventive services available has been given to the patient.    Reviewed patients plan of care and provided an AVS. The Basic Care Plan (routine screening as documented in Health Maintenance) for Oswald meets the Care Plan requirement. This Care Plan has been established and reviewed with the Patient.    Counseling Resources:  ATP IV Guidelines  Pooled Cohorts Equation Calculator  Breast Cancer Risk Calculator  BRCA-Related Cancer Risk Assessment: FHS-7 " Tool  FRAX Risk Assessment  ICSI Preventive Guidelines  Dietary Guidelines for Americans, 2010  USDA's MyPlate  ASA Prophylaxis  Lung CA Screening    R Raul Lowery MD  [facility]

## 2021-03-11 NOTE — PATIENT INSTRUCTIONS
Preventive Health Recommendations:     See your health care provider every year to    Review health changes.     Discuss preventive care.      Review your medicines if your doctor has prescribed any.      Talk with your health care provider about whether you should have a test to screen for prostate cancer (PSA).    Every 3 years, have a diabetes test (fasting glucose). If you are at risk for diabetes, you should have this test more often.    Every 5 years, have a cholesterol test. Have this test more often if you are at risk for high cholesterol or heart disease.     Every 10 years, have a colonoscopy. Or, have a yearly FIT test (stool test). These exams will check for colon cancer.    Talk to with your health care provider about screening for Abdominal Aortic Aneurysm if you have a family history of AAA or have a history of smoking.    Shots:     Get a flu shot each year.     Get a tetanus shot every 10 years.     Talk to your doctor about your pneumonia vaccines. There are now two you should receive - Pneumovax (PPSV 23) and Prevnar (PCV 13).     Talk to your pharmacist about a shingles vaccine.     Talk to your doctor about the hepatitis B vaccine.  Nutrition:     Eat at least 5 servings of fruits and vegetables each day.     Eat whole-grain bread, whole-wheat pasta and brown rice instead of white grains and rice.     Get adequate Calcium and Vitamin D.   Lifestyle    Exercise for at least 150 minutes a week (30 minutes a day, 5 days a week). This will help you control your weight and prevent disease.     Limit alcohol to one drink per day.     No smoking.     Wear sunscreen to prevent skin cancer.    See your dentist every six months for an exam and cleaning.    See your eye doctor every 1 to 2 years to screen for conditions such as glaucoma, macular degeneration, cataracts, etc.    Personalized Prevention Plan  You are due for the preventive services outlined below.  Your care team is available to assist you  in scheduling these services.  If you have already completed any of these items, please share that information with your care team to update in your medical record.  Health Maintenance Due   Topic Date Due     Zoster (Shingles) Vaccine (1 of 2) Never done     Pneumococcal Vaccine (2 of 2 - PPSV23) 01/25/2019     Annual Wellness Visit  03/15/2020     Diptheria Tetanus Pertussis (DTAP/TDAP/TD) Vaccine (2 - Td) 04/30/2020     Depression Assessment  05/25/2020     Flu Vaccine (1) 09/01/2020     FALL RISK ASSESSMENT  11/25/2020     Colorectal Cancer Screening  03/01/2021     Patient Education   Personalized Prevention Plan  You are due for the preventive services outlined below.  Your care team is available to assist you in scheduling these services.  If you have already completed any of these items, please share that information with your care team to update in your medical record.  Health Maintenance Due   Topic Date Due     Zoster (Shingles) Vaccine (1 of 2) Never done     Pneumococcal Vaccine (2 of 2 - PPSV23) 01/25/2019     Diptheria Tetanus Pertussis (DTAP/TDAP/TD) Vaccine (2 - Td) 04/30/2020     Depression Assessment  05/25/2020     Flu Vaccine (1) 09/01/2020     FALL RISK ASSESSMENT  11/25/2020     Colorectal Cancer Screening  03/01/2021

## 2021-03-11 NOTE — NURSING NOTE
"Chief Complaint   Patient presents with     Physical       Initial /72   Pulse 63   Temp 97  F (36.1  C) (Tympanic)   Ht 1.676 m (5' 6\")   Wt 95.4 kg (210 lb 6.4 oz)   SpO2 97%   BMI 33.96 kg/m   Estimated body mass index is 33.96 kg/m  as calculated from the following:    Height as of this encounter: 1.676 m (5' 6\").    Weight as of this encounter: 95.4 kg (210 lb 6.4 oz).  Medication Reconciliation: complete  Keri Tolentino LPN  "

## 2021-03-12 ENCOUNTER — TELEPHONE (OUTPATIENT)
Dept: FAMILY MEDICINE | Facility: OTHER | Age: 71
End: 2021-03-12

## 2021-03-12 NOTE — TELEPHONE ENCOUNTER
Per PCP-   He should avoid salt and limit alcohol and caffeine and if he smokes cut down and quit.  Also avoid strenuous activities until he is seen by cardiology.    Message text      Please call pt.

## 2021-03-12 NOTE — TELEPHONE ENCOUNTER
Pt called, states that he was seen for a physical yesterday and was told he has an enlarged heart. Pt is scheduled for an echocardiogram on 3/25/21 but is wondering if there is anything he should avoid in the mean time, ex- caffeine, alcohol, exercise. Please advise. Thank you!

## 2021-03-12 NOTE — TELEPHONE ENCOUNTER
Spoke with patient he has been advised of providers instructions also to wait on colonoscopy until after he has echo verbalized understanding.  Wanda Zambrano LPN

## 2021-03-16 DIAGNOSIS — Z12.5 SCREENING FOR PROSTATE CANCER: ICD-10-CM

## 2021-03-16 DIAGNOSIS — Z00.00 ROUTINE GENERAL MEDICAL EXAMINATION AT A HEALTH CARE FACILITY: ICD-10-CM

## 2021-03-16 DIAGNOSIS — E78.00 PURE HYPERCHOLESTEROLEMIA: ICD-10-CM

## 2021-03-16 DIAGNOSIS — R06.09 DYSPNEA ON EXERTION: ICD-10-CM

## 2021-03-16 DIAGNOSIS — I10 ESSENTIAL HYPERTENSION, BENIGN: ICD-10-CM

## 2021-03-16 DIAGNOSIS — E78.00 PURE HYPERCHOLESTEROLEMIA: Primary | ICD-10-CM

## 2021-03-16 LAB
ALBUMIN SERPL-MCNC: 3.8 G/DL (ref 3.4–5)
ALP SERPL-CCNC: 93 U/L (ref 40–150)
ALT SERPL W P-5'-P-CCNC: 40 U/L (ref 0–70)
ANION GAP SERPL CALCULATED.3IONS-SCNC: 4 MMOL/L (ref 3–14)
AST SERPL W P-5'-P-CCNC: 29 U/L (ref 0–45)
BASOPHILS # BLD AUTO: 0 10E9/L (ref 0–0.2)
BASOPHILS NFR BLD AUTO: 0.2 %
BILIRUB SERPL-MCNC: 0.9 MG/DL (ref 0.2–1.3)
BUN SERPL-MCNC: 22 MG/DL (ref 7–30)
CALCIUM SERPL-MCNC: 9.4 MG/DL (ref 8.5–10.1)
CHLORIDE SERPL-SCNC: 106 MMOL/L (ref 94–109)
CHOLEST SERPL-MCNC: 195 MG/DL
CO2 SERPL-SCNC: 28 MMOL/L (ref 20–32)
CREAT SERPL-MCNC: 1 MG/DL (ref 0.66–1.25)
DIFFERENTIAL METHOD BLD: ABNORMAL
EOSINOPHIL # BLD AUTO: 0.1 10E9/L (ref 0–0.7)
EOSINOPHIL NFR BLD AUTO: 2.4 %
ERYTHROCYTE [DISTWIDTH] IN BLOOD BY AUTOMATED COUNT: 13.3 % (ref 10–15)
GFR SERPL CREATININE-BSD FRML MDRD: 75 ML/MIN/{1.73_M2}
GLUCOSE SERPL-MCNC: 99 MG/DL (ref 70–99)
HCT VFR BLD AUTO: 41.9 % (ref 40–53)
HDLC SERPL-MCNC: 46 MG/DL
HGB BLD-MCNC: 14 G/DL (ref 13.3–17.7)
IMM GRANULOCYTES # BLD: 0 10E9/L (ref 0–0.4)
IMM GRANULOCYTES NFR BLD: 0.4 %
LDLC SERPL CALC-MCNC: 106 MG/DL
LYMPHOCYTES # BLD AUTO: 1.3 10E9/L (ref 0.8–5.3)
LYMPHOCYTES NFR BLD AUTO: 25.9 %
MCH RBC QN AUTO: 32.3 PG (ref 26.5–33)
MCHC RBC AUTO-ENTMCNC: 33.4 G/DL (ref 31.5–36.5)
MCV RBC AUTO: 97 FL (ref 78–100)
MONOCYTES # BLD AUTO: 0.6 10E9/L (ref 0–1.3)
MONOCYTES NFR BLD AUTO: 11.2 %
NEUTROPHILS # BLD AUTO: 3 10E9/L (ref 1.6–8.3)
NEUTROPHILS NFR BLD AUTO: 59.9 %
NONHDLC SERPL-MCNC: 149 MG/DL
NRBC # BLD AUTO: 0 10*3/UL
NRBC BLD AUTO-RTO: 0 /100
NT-PROBNP SERPL-MCNC: 69 PG/ML (ref 0–125)
PLATELET # BLD AUTO: 209 10E9/L (ref 150–450)
POTASSIUM SERPL-SCNC: 3.9 MMOL/L (ref 3.4–5.3)
PROT SERPL-MCNC: 8.1 G/DL (ref 6.8–8.8)
PSA SERPL-ACNC: 0.2 UG/L (ref 0–4)
RBC # BLD AUTO: 4.33 10E12/L (ref 4.4–5.9)
SODIUM SERPL-SCNC: 138 MMOL/L (ref 133–144)
TRIGL SERPL-MCNC: 214 MG/DL
WBC # BLD AUTO: 5 10E9/L (ref 4–11)

## 2021-03-16 PROCEDURE — G0103 PSA SCREENING: HCPCS | Mod: ZL | Performed by: FAMILY MEDICINE

## 2021-03-16 PROCEDURE — 83880 ASSAY OF NATRIURETIC PEPTIDE: CPT | Mod: ZL | Performed by: FAMILY MEDICINE

## 2021-03-16 PROCEDURE — 85025 COMPLETE CBC W/AUTO DIFF WBC: CPT | Mod: ZL | Performed by: FAMILY MEDICINE

## 2021-03-16 PROCEDURE — 80061 LIPID PANEL: CPT | Mod: ZL | Performed by: FAMILY MEDICINE

## 2021-03-16 PROCEDURE — 36415 COLL VENOUS BLD VENIPUNCTURE: CPT | Mod: ZL | Performed by: FAMILY MEDICINE

## 2021-03-16 PROCEDURE — 80053 COMPREHEN METABOLIC PANEL: CPT | Mod: ZL | Performed by: FAMILY MEDICINE

## 2021-03-16 RX ORDER — SIMVASTATIN 40 MG
40 TABLET ORAL AT BEDTIME
Qty: 90 TABLET | Refills: 3 | Status: SHIPPED | OUTPATIENT
Start: 2021-03-16 | End: 2022-03-23

## 2021-03-19 ENCOUNTER — TELEPHONE (OUTPATIENT)
Dept: FAMILY MEDICINE | Facility: OTHER | Age: 71
End: 2021-03-19

## 2021-03-19 NOTE — TELEPHONE ENCOUNTER
He is experiencing some tingling through body and is wondering if it is being caused by his enlarge heart?  Has ECHO scheduled next week, cardiology next month.

## 2021-03-19 NOTE — TELEPHONE ENCOUNTER
Per Dr. Carter pt was notified that his heart failure test was good and he should get the other testing done.  Told pt to try to relax until then, but if he did have any palpations, SOB, or tightening in chest to go into the ER.

## 2021-03-23 ENCOUNTER — IMMUNIZATION (OUTPATIENT)
Dept: FAMILY MEDICINE | Facility: OTHER | Age: 71
End: 2021-03-23
Attending: FAMILY MEDICINE
Payer: COMMERCIAL

## 2021-03-23 PROCEDURE — 91300 PR COVID VAC PFIZER DIL RECON 30 MCG/0.3 ML IM: CPT

## 2021-03-25 ENCOUNTER — HOSPITAL ENCOUNTER (OUTPATIENT)
Facility: HOSPITAL | Age: 71
End: 2021-03-25
Attending: SURGERY | Admitting: SURGERY
Payer: COMMERCIAL

## 2021-03-25 ENCOUNTER — TELEPHONE (OUTPATIENT)
Dept: SURGERY | Facility: OTHER | Age: 71
End: 2021-03-25

## 2021-03-25 ENCOUNTER — HOSPITAL ENCOUNTER (OUTPATIENT)
Dept: CARDIOLOGY | Facility: HOSPITAL | Age: 71
Discharge: HOME | End: 2021-03-25
Attending: FAMILY MEDICINE | Admitting: INTERNAL MEDICINE
Payer: COMMERCIAL

## 2021-03-25 ENCOUNTER — PREP FOR PROCEDURE (OUTPATIENT)
Dept: SURGERY | Facility: OTHER | Age: 71
End: 2021-03-25

## 2021-03-25 DIAGNOSIS — Z12.11 COLON CANCER SCREENING: ICD-10-CM

## 2021-03-25 DIAGNOSIS — I51.7 ENLARGED HEART: ICD-10-CM

## 2021-03-25 DIAGNOSIS — Z12.11 COLON CANCER SCREENING: Primary | ICD-10-CM

## 2021-03-25 PROCEDURE — 93306 TTE W/DOPPLER COMPLETE: CPT

## 2021-03-25 PROCEDURE — 93306 TTE W/DOPPLER COMPLETE: CPT | Mod: 26 | Performed by: INTERNAL MEDICINE

## 2021-03-25 NOTE — TELEPHONE ENCOUNTER
Referral received for colonoscopy.   This patient was approved by surgery education nurses for meet and greet colonoscopy, but is opting to schedule after his cardiology follow-up.  Scheduled for colonoscopy on 5/3/2021 with Dr. Villa at [facility] with Gatorade bowel prep.   Instructions given via phone and instructions mailed to patient with surgery handbook.

## 2021-03-25 NOTE — LETTER
March 25, 2021          Oswald Goel  [address] [facility] [address]      Dear Oswald,       We want to your Colonoscopy to be as pleasant as possible. Please review the instructions below. If you have questions, you may contact us at the any of the following numbers:   [facility] Coordinator: [phone]  Clinic Nurse (Oksana): [phone]  Surgery Education Nurse: [phone]      Date of procedure: 5/3/2021 with Dr. Villa  Admit Time: Hospital Surgery will call you the day before your procedure by 5pm with your arrival time. If your surgery is on Monday, expect a call on Friday.  If you are not contacted before 5PM, please call admitting at [phone].   After hours or on weekends, please call [phone] to postpone.   Call the clinic nurse if you become ill within 1 week of your procedure to reschedule.     COVID-19 test is needed 4-5 days before procedure in the morning. This testing is done in the [facility] on the [place] side of [facility] (weekdays and weekends) or in the [facility] at the [facility] (weekdays only).  Follow the signage for parking and bring your mobile phone if you have one to call the phone number on the sign outside the testing site for check-in. If you do not have a cell phone, please call the nurse for instructions on checking in.   This has been scheduled for 4/28/2021 at 11:15 at the [place] site.      Please  the following over the counter items for your bowel prep:    Two 5 mg Dulcolax (bisacodyl) tablets   One 8.3 ounce (238 g) bottle of Miralax   One 10 ounce bottle of liquid Magnesium Citrate-not capsules.   One 64 ounce bottle of Gatorade-Not red, purple, or powdered.      7 DAYS BEFORE THE EXAM:   4/26  Call the Surgery Education Nurse at [phone] and have a medication list ready.   Stop Aspirin or NSAIDS (Ibuprofen, Celebrex, Naproxen, etc) 7 days before surgery.  Stop fiber supplements, herbals, vitamins, and  "iron. Stop eating corn, nuts and seeds.  If you are prescribed a daily 81mg Aspirin, you may continue this.  If you are prescribed blood thinners or insulin, talk to your primary provider for instructions.    2 DAYS BEFORE THE EXAM:   5/1  Low fiber diet.   See list of low fiber foods on page 3 of the \"Miralax, Dulcolax and Magnesium Citrate\" packet.   Drink at least 4-6 large glasses of sports drink today and tomorrow. Avoid red and purple.    1 DAY BEFORE THE EXAM:   5/2  No solid food/milk products after 12:01 AM. Drink only clear liquids all day, at least 8-10 glasses.   Please see list of clear liquids on page 2 of \"Miralax, Dulcolax and Magnesium Citrate\" packet.    Avoid anything red or purple. No alcohol.            AT 12:00 PM NOON THE DAY BEFORE EXAM:  Take 2 Dulcolax tablets by mouth with clear liquids.            AT 6:00 PM THE DAY BEFORE EXAM:  Mix the bottle of Miralax and 64 oz. of Gatorade in a pitcher.   Drink one 8 oz. glass every 10-15 minutes until gone. Stay near a toilet.     DAY OF COLONOSCOPY:   5/3            6 HOURS PRIOR TO EXAM ON DAY OF PROCEDURE:  Drink the bottle of Magnesium Citrate followed by a full glass of water.   You may have clear liquids up until 2 hours before arrival.  If you need to take any medications after this, take them with a tiny sip of water.   If you have asthma, bring your inhaler with you.  Shower before arrival and wear clean, comfortable clothes.   No jewelry, make-up, nail polish, hair spray, lotions, or perfumes.   [place] in Admitting through the [facility].   You must have a responsible adult to drive and to stay with you for 4 hours at home.         TIPS FOR COLON CLEANSING BEFORE YOUR COLONOSCOPY  To get accurate results from your exam, your colon must be completely empty or you may need to repeat the colon prep and exam. If you followed instructions and your stool is clear or yellow liquid, you are ready. If you are not sure if your colon is clean, " please call the clinic nurse.    You may use Tucks wipes, hemorrhoid treatments, hydrocortisone cream or alcohol-free baby wipes to ease anal irritation. You may also use Vaseline to help protect the skin.     Quickly drink each glass. Even when you are sitting on the toilet, keep drinking every 15 minutes. If you have nausea or vomiting, take a break for 30 minutes and then resume drinking.    You will have loose watery stools and may also have chills. Dress for comfort. Expect to feel bloating, nausea and other discomfort until the stool clears from your colon.             Sincerely,        Eh Villa MD/Oksana KRAUSE LPN

## 2021-04-23 DIAGNOSIS — I51.7 ENLARGED HEART: ICD-10-CM

## 2021-04-23 DIAGNOSIS — I10 ESSENTIAL HYPERTENSION, BENIGN: Primary | ICD-10-CM

## 2021-04-26 ENCOUNTER — TELEPHONE (OUTPATIENT)
Dept: SURGERY | Facility: OTHER | Age: 71
End: 2021-04-26

## 2021-04-26 ENCOUNTER — ANESTHESIA EVENT (OUTPATIENT)
Dept: SURGERY | Facility: HOSPITAL | Age: 71
End: 2021-04-26

## 2021-04-26 RX ORDER — MEPERIDINE HYDROCHLORIDE 25 MG/ML
12.5 INJECTION INTRAMUSCULAR; INTRAVENOUS; SUBCUTANEOUS
Status: CANCELLED | OUTPATIENT
Start: 2021-04-26

## 2021-04-26 RX ORDER — LABETALOL 20 MG/4 ML (5 MG/ML) INTRAVENOUS SYRINGE
10
Status: CANCELLED | OUTPATIENT
Start: 2021-04-26

## 2021-04-26 RX ORDER — HYDROMORPHONE HYDROCHLORIDE 1 MG/ML
.3-.5 INJECTION, SOLUTION INTRAMUSCULAR; INTRAVENOUS; SUBCUTANEOUS EVERY 10 MIN PRN
Status: CANCELLED | OUTPATIENT
Start: 2021-04-26

## 2021-04-26 RX ORDER — LIDOCAINE 40 MG/G
CREAM TOPICAL
Status: CANCELLED | OUTPATIENT
Start: 2021-04-26

## 2021-04-26 RX ORDER — ONDANSETRON 2 MG/ML
4 INJECTION INTRAMUSCULAR; INTRAVENOUS EVERY 30 MIN PRN
Status: CANCELLED | OUTPATIENT
Start: 2021-04-26

## 2021-04-26 RX ORDER — NALOXONE HYDROCHLORIDE 0.4 MG/ML
0.2 INJECTION, SOLUTION INTRAMUSCULAR; INTRAVENOUS; SUBCUTANEOUS
Status: CANCELLED | OUTPATIENT
Start: 2021-04-26 | End: 2021-04-27

## 2021-04-26 RX ORDER — SODIUM CHLORIDE, SODIUM LACTATE, POTASSIUM CHLORIDE, CALCIUM CHLORIDE 600; 310; 30; 20 MG/100ML; MG/100ML; MG/100ML; MG/100ML
INJECTION, SOLUTION INTRAVENOUS CONTINUOUS
Status: CANCELLED | OUTPATIENT
Start: 2021-04-26

## 2021-04-26 RX ORDER — NALOXONE HYDROCHLORIDE 0.4 MG/ML
0.4 INJECTION, SOLUTION INTRAMUSCULAR; INTRAVENOUS; SUBCUTANEOUS
Status: CANCELLED | OUTPATIENT
Start: 2021-04-26 | End: 2021-04-27

## 2021-04-26 RX ORDER — ONDANSETRON 4 MG/1
4 TABLET, ORALLY DISINTEGRATING ORAL EVERY 30 MIN PRN
Status: CANCELLED | OUTPATIENT
Start: 2021-04-26

## 2021-04-26 RX ORDER — FENTANYL CITRATE 50 UG/ML
25-50 INJECTION, SOLUTION INTRAMUSCULAR; INTRAVENOUS EVERY 5 MIN PRN
Status: CANCELLED | OUTPATIENT
Start: 2021-04-26

## 2021-04-26 RX ORDER — ALBUTEROL SULFATE 0.83 MG/ML
2.5 SOLUTION RESPIRATORY (INHALATION) EVERY 4 HOURS PRN
Status: CANCELLED | OUTPATIENT
Start: 2021-04-26

## 2021-04-26 ASSESSMENT — LIFESTYLE VARIABLES: TOBACCO_USE: 1

## 2021-04-26 NOTE — TELEPHONE ENCOUNTER
Returned call to patient. He has 8.3 oz bottle of generic Miralax. Confirmed with patient that he has the correct medication. Prep instructiuons reviewed with patient.

## 2021-04-26 NOTE — TELEPHONE ENCOUNTER
Patient left message on [facility] phone asking for a call back about the Miralax-powdered he picked up from pharmacy. He is unsure he has the right med.    [phone] is phone number

## 2021-04-26 NOTE — ANESTHESIA PREPROCEDURE EVALUATION
Anesthesia Pre-Procedure Evaluation    Patient: Oswald Goel   MRN: [id number] : 1950        Preoperative Diagnosis: Colon cancer screening [Z12.11]   Procedure : Procedure(s):  screening colonoscopy, possible biopsy, possible polypectomy     Past Medical History:   Diagnosis Date     Depressive disorder, not elsewhere classified 2011     Hypertension, Benign 2011     Hypertrophy (benign) of prostate without urinary o 10/10/2007     Nonallopathic lesion of cervical region, not elsewhere classified 2008     Pure hypercholesterolemia 1999     RBBB 2011      Past Surgical History:   Procedure Laterality Date     COLONOSCOPY  2011    repeat 10 yrs     COLONOSCOPY  2005     HERNIA REPAIR       left arthroscopy       RELEASE CARPAL TUNNEL      RT     TONSILLECTOMY       TRANSPOSITION ULNAR NERVE (ELBOW) Left 2015    Procedure: TRANSPOSITION ULNAR NERVE (ELBOW);  Surgeon: Mahesh Capps MD;  Location: [facility]      Allergies   Allergen Reactions     Animal Dander      Deer hair      Social History     Tobacco Use     Smoking status: Former Smoker     Types: Cigarettes     Smokeless tobacco: Former User     Tobacco comment: no passive exposure, tried to quit-yes   Substance Use Topics     Alcohol use: Yes     Comment: 2 glasses, daily, yesterday      Wt Readings from Last 1 Encounters:   21 95.4 kg (210 lb 6.4 oz)        Anesthesia Evaluation            ROS/MED HX  ENT/Pulmonary:     (+) tobacco use,     Neurologic:       Cardiovascular:     (+) Dyslipidemia hypertension-----Previous cardiac testing   Echo: Date: 3/2021 Results:  No pericardial effusion is present.  Global and regional left ventricular function is normal with an EF of 60-65%.  Mild concentric wall thickening consistent with left ventricular hypertrophy  is present.  The right ventricle is normal size.  Both atria appear normal.  The mitral valve is normal.  Mild to moderate aortic insufficiency is  present.  The pulmonic valve is normal.  The tricuspid valve is normal.  The aorta root is normal.  Left ventricular size is normal.  ______________________________________________________________________________  Stress Test: Date: Results:    ECG Reviewed: Date: Results:    Cath: Date: Results:      METS/Exercise Tolerance:     Hematologic:       Musculoskeletal:       GI/Hepatic:       Renal/Genitourinary:     (+) BPH,     Endo:       Psychiatric/Substance Use:     (+) psychiatric history depression     Infectious Disease:       Malignancy:       Other:               OUTSIDE LABS:  CBC:   Lab Results   Component Value Date    WBC 5.0 03/16/2021    WBC 5.2 05/24/2019    HGB 14.0 03/16/2021    HGB 12.5 (L) 05/24/2019    HCT 41.9 03/16/2021    HCT 38.0 (L) 05/24/2019     03/16/2021     05/24/2019     BMP:   Lab Results   Component Value Date     03/16/2021     11/25/2019    POTASSIUM 3.9 03/16/2021    POTASSIUM 4.2 11/25/2019    CHLORIDE 106 03/16/2021    CHLORIDE 107 11/25/2019    CO2 28 03/16/2021    CO2 28 11/25/2019    BUN 22 03/16/2021    BUN 13 11/25/2019    CR 1.00 03/16/2021    CR 0.95 11/25/2019    GLC 99 03/16/2021     (H) 11/25/2019     COAGS:   Lab Results   Component Value Date    INR 1.00 02/21/2017     POC: No results found for: BGM, HCG, HCGS  HEPATIC:   Lab Results   Component Value Date    ALBUMIN 3.8 03/16/2021    PROTTOTAL 8.1 03/16/2021    ALT 40 03/16/2021    AST 29 03/16/2021    ALKPHOS 93 03/16/2021    BILITOTAL 0.9 03/16/2021     OTHER:   Lab Results   Component Value Date    MYA 9.4 03/16/2021    SED 33 (H) 05/22/2015       Anesthesia Plan                        Consents            Postoperative Care            Comments:    No ERIN Oden CRNA

## 2021-04-27 NOTE — PROGRESS NOTES
[facility] HEART CARE   CARDIOLOGY CONSULT     Oswald Goel   1950  [id number]    ALDEN Lowery     Chief Complaint   Patient presents with     New Patient     enlarged heart          HPI:   Mr. Goel who is a 70-year-old gentleman is being seen by cardiology for concerns of an enlarged heart seen on his chest x-ray.  Thankfully, his echocardiogram did not show these findings.  He also has complaints of numbness and tingling as well as shortness of breath/TENORIO.    He was diagnosed with COVID-19 on approximately 11/20/2020.  He has had shortness of breath/TENORIO since.  He describes a history of minimal/nonocclusive coronary disease based on his cardiac catheterization 40 to 50 years ago at the [facility], upper and lower extremity tingling of unknown etiology, regular alcohol usage, hyperlipidemia-controlled, hypertension-controlled, regular Tylenol usage, and hypertriglyceridemia-uncontrolled.    He has been short of breath since approximately 11/20/2020 when he was diagnosed with COVID-19.  He was seen in the clinic on 3/11/2021 related to these ongoing issues with dyspnea on exertion.  He gives examples of carrying wood, exercising, and lifting other heavy items.  He is uncertain if he was short breath prior to the diagnosis of COVID-19.  As part of his work-up, he had a chest x-ray and BNP among other labs.  His chest x-ray on 3/11/2021 showed cardiomegaly without evidence of heart failure.  His lungs were clear.  He was referred to cardiology for his enlarged heart.    He had an echocardiogram on 3/25/2021 which showed a normal EF of 60-65%, with mild concentric wall thickening, and normal left ventricular chamber sizes.  I suspect the chest x-ray over enlarged the size of his heart and his heart is not enlarged.    He also has concerns for tingling in his arms and legs.  He noted the symptoms after he was given the diagnosis of an enlarged heart.  We discussed doing routine labs to rule out abnormalities for his  tingling.  This includes thyroid function, folate, B12, and magnesium.    Also, with the shortness of breath and potential history of heart disease, she was suggested he have a stress test on 4/28/2021.  He states he would be able do an exercise stress test.  This was ordered.  He reports having poor lungs, with history of COVID-19, will plan for PFT's.  He does occasionally smoke which he describes as being socially.  He denies ever having consistent tobacco usage.    IMAGING RESULTS:   CXR on 3/11/21:  1. Cardiomegaly without failure.  2. Clear lungs.      ECHO on 3/25/21:  No pericardial effusion is present.  Global and regional left ventricular function is normal with an EF of 60-65%.  Mild concentric wall thickening consistent with left ventricular hypertrophy is present.  The right ventricle is normal size.  Both atria appear normal.  The mitral valve is normal.  Mild to moderate aortic insufficiency is present.  The pulmonic valve is normal.  The tricuspid valve is normal.  The aorta root is normal.  Left ventricular size is normal.  Mild concentric wall thickening consistent with left ventricular hypertrophy is present.        CURRENT MEDICATIONS:   Prior to Admission medications    Medication Sig Start Date End Date Taking? Authorizing Provider   acetaminophen (TYLENOL) 500 MG tablet Take 500-1,000 mg by mouth every 6 hours as needed for mild pain    Reported, Patient   allopurinol (ZYLOPRIM) 100 MG tablet Take 2 tablets (200 mg) by mouth daily  Patient not taking: Reported on 3/11/2021 11/25/19   ALDEN Lowery MD   amLODIPine (NORVASC) 2.5 MG tablet Take with the 5 mg tablet 11/19/20   ALDEN Lowery MD   amLODIPine (NORVASC) 5 MG tablet Take 1 tablet (5 mg) by mouth daily take with the 2.5 mg tablet daily 11/19/20   ALDEN Lowery MD   amoxicillin (AMOXIL) 500 MG capsule Take 4 capsules 30-60 minutes before procedure  Patient not taking: Reported on 8/25/2020 6/16/20   ALDEN Lowery MD   colchicine  (COLCYRS) 0.6 MG tablet Take 1 tablet (0.6 mg) by mouth daily  Patient not taking: Reported on 3/11/2021 11/25/19   ALDEN Lowery MD   cyclobenzaprine (FLEXERIL) 10 MG tablet Take 1 tablet (10 mg) by mouth 3 times daily as needed for muscle spasms  Patient not taking: Reported on 3/11/2021 8/25/20   ALDEN Lowery MD   Ferrous Gluconate 240 (27 Fe) MG TABS Take 1 mg by mouth daily    Reported, Patient   fish oil-omega-3 fatty acids 1000 MG capsule Take 1 g by mouth daily    Reported, Patient   lisinopril (ZESTRIL) 20 MG tablet TAKE 1 TABLET BY MOUTH ONCE DAILY. 2/16/21   ALDEN Lowery MD   Milk Thistle-Dand-Fennel-Licor (MILK THISTLE XTRA) CAPS capsule     Reported, Patient   mirtazapine (REMERON) 15 MG tablet Take 1 tablet (15 mg) by mouth At Bedtime  Patient not taking: Reported on 3/11/2021 11/25/19   ALDEN Lowery MD   Multiple Vitamin (DAILY MULTIVITAMIN PO) Take 1 tablet by Oral route every day with food    Reported, Patient   simvastatin (ZOCOR) 40 MG tablet Take 1 tablet (40 mg) by mouth At Bedtime 3/16/21   ALDEN Lowery MD   Turmeric 500 MG TABS     Reported, Patient   vitamin C (ASCORBIC ACID) 1000 MG TABS Take 1,000 mg by mouth daily    Reported, Patient       ALLERGIES:   Allergies   Allergen Reactions     Animal Dander      Deer hair        PAST MEDICAL HISTORY:   Past Medical History:   Diagnosis Date     Depressive disorder, not elsewhere classified 02/22/2011     Hypertension, Benign 02/22/2011     Hypertrophy (benign) of prostate without urinary o 10/10/2007     Nonallopathic lesion of cervical region, not elsewhere classified 08/27/2008     Pure hypercholesterolemia 12/29/1999     RBBB 02/22/2011        PAST SURGICAL HISTORY:   Past Surgical History:   Procedure Laterality Date     COLONOSCOPY  03-    repeat 10 yrs     COLONOSCOPY  2005     HERNIA REPAIR       left arthroscopy       RELEASE CARPAL TUNNEL      RT     TONSILLECTOMY       TRANSPOSITION ULNAR NERVE (ELBOW) Left  11/19/2015    Procedure: TRANSPOSITION ULNAR NERVE (ELBOW);  Surgeon: Mahesh Capps MD;  Location: [facility]        FAMILY HISTORY:   Family History   Problem Relation Age of Onset     Cancer Mother         Pancreatic     Heart Disease Mother         Heart Surgery        SOCIAL HISTORY:   Social History     Socioeconomic History     Marital status:      Spouse name: Not on file     Number of children: Not on file     Years of education: Not on file     Highest education level: Not on file   Occupational History     Employer: UNEMPLOYED     Occupation: mines     Comment: retired   Social Needs     Financial resource strain: Not on file     Food insecurity     Worry: Not on file     Inability: Not on file     Transportation needs     Medical: Not on file     Non-medical: Not on file   Tobacco Use     Smoking status: Former Smoker     Types: Cigarettes     Smokeless tobacco: Former User     Tobacco comment: no passive exposure, tried to quit-yes   Substance and Sexual Activity     Alcohol use: Yes     Comment: 2 glasses, daily, yesterday     Drug use: No     Sexual activity: Not on file   Lifestyle     Physical activity     Days per week: Not on file     Minutes per session: Not on file     Stress: Not on file   Relationships     Social connections     Talks on phone: Not on file     Gets together: Not on file     Attends Bahai service: Not on file     Active member of club or organization: Not on file     Attends meetings of clubs or organizations: Not on file     Relationship status: Not on file     Intimate partner violence     Fear of current or ex partner: Not on file     Emotionally abused: Not on file     Physically abused: Not on file     Forced sexual activity: Not on file   Other Topics Concern      Service Not Asked     Blood Transfusions Not Asked     Caffeine Concern Yes     Comment: coffee, > 6 cups, daily     Occupational Exposure Not Asked     Hobby Hazards Not Asked     Sleep  Concern Not Asked     Stress Concern Not Asked     Weight Concern Not Asked     Special Diet Not Asked     Back Care Not Asked     Exercise Not Asked     Bike Helmet Not Asked     Seat Belt Not Asked     Self-Exams Not Asked     Parent/sibling w/ CABG, MI or angioplasty before 65F 55M? Yes   Social History Narrative    1/15: Kirti lives with one of his sones.  He is retired from [employer].  He likes to curl.           ROS:   CONSTITUTIONAL: No weight loss, fever, chills, admits to weakness and fatigue.   HEENT: Eyes: No visual changes. Ears, Nose, Throat: No hearing loss, congestion or difficulty swallowing.   CARDIOVASCULAR: No chest pain, chest pressure or chest discomfort. No palpitations or lower extremity edema.   RESPIRATORY: (+) shortness of breath with dyspnea upon exertion, no cough or sputum production.   GASTROINTESTINAL: No abdominal pain. No anorexia, nausea, vomiting or diarrhea.   NEUROLOGICAL: No headache, lightheadedness, dizziness, syncope, ataxia or weakness.   HEMATOLOGIC: No anemia, bleeding or bruising.   PSYCHIATRIC: No history of depression or anxiety.   ENDOCRINOLOGIC: No reports of sweating, cold or heat intolerance. No polyuria or polydipsia.   SKIN: No abnormal rashes or itching.       PHYSICAL EXAM:   GENERAL: The patient is a well-developed, well-nourished, in no apparent distress. Alert and oriented x3.   HEENT: Head is normocephalic and atraumatic. Eyes are symmetrical with normal visual tracking.  HEART: Regular rate and rhythm, S1S2 present without murmur, rub or gallop.   LUNGS: Respirations regular and unlabored. Clear to auscultation.   EXTREMITIES: No peripheral edema present.   NEUROLOGIC: Alert and oriented X3.    SKIN: No jaundice. No rashes or visible skin lesions present.        LAB RESULTS:   Orders Only on 03/16/2021   Component Date Value Ref Range Status     N-Terminal Pro Bnp 03/16/2021 69  0 - 125 pg/mL Final     PSA 03/16/2021 0.20  0 - 4 ug/L Final      WBC 03/16/2021 5.0  4.0 - 11.0 10e9/L Final     RBC Count 03/16/2021 4.33* 4.4 - 5.9 10e12/L Final     Hemoglobin 03/16/2021 14.0  13.3 - 17.7 g/dL Final     Hematocrit 03/16/2021 41.9  40.0 - 53.0 % Final     MCV 03/16/2021 97  78 - 100 fl Final     MCH 03/16/2021 32.3  26.5 - 33.0 pg Final     MCHC 03/16/2021 33.4  31.5 - 36.5 g/dL Final     RDW 03/16/2021 13.3  10.0 - 15.0 % Final     Platelet Count 03/16/2021 209  150 - 450 10e9/L Final     Diff Method 03/16/2021 Automated Method   Final     % Neutrophils 03/16/2021 59.9  % Final     % Lymphocytes 03/16/2021 25.9  % Final     % Monocytes 03/16/2021 11.2  % Final     % Eosinophils 03/16/2021 2.4  % Final     % Basophils 03/16/2021 0.2  % Final     % Immature Granulocytes 03/16/2021 0.4  % Final     Nucleated RBCs 03/16/2021 0  0 /100 Final     Absolute Neutrophil 03/16/2021 3.0  1.6 - 8.3 10e9/L Final     Absolute Lymphocytes 03/16/2021 1.3  0.8 - 5.3 10e9/L Final     Absolute Monocytes 03/16/2021 0.6  0.0 - 1.3 10e9/L Final     Absolute Eosinophils 03/16/2021 0.1  0.0 - 0.7 10e9/L Final     Absolute Basophils 03/16/2021 0.0  0.0 - 0.2 10e9/L Final     Abs Immature Granulocytes 03/16/2021 0.0  0 - 0.4 10e9/L Final     Absolute Nucleated RBC 03/16/2021 0.0   Final     Cholesterol 03/16/2021 195  <200 mg/dL Final     Triglycerides 03/16/2021 214* <150 mg/dL Final     HDL Cholesterol 03/16/2021 46  >39 mg/dL Final     LDL Cholesterol Calculated 03/16/2021 106* <100 mg/dL Final     Non HDL Cholesterol 03/16/2021 149* <130 mg/dL Final     Sodium 03/16/2021 138  133 - 144 mmol/L Final     Potassium 03/16/2021 3.9  3.4 - 5.3 mmol/L Final     Chloride 03/16/2021 106  94 - 109 mmol/L Final     Carbon Dioxide 03/16/2021 28  20 - 32 mmol/L Final     Anion Gap 03/16/2021 4  3 - 14 mmol/L Final     Glucose 03/16/2021 99  70 - 99 mg/dL Final     Urea Nitrogen 03/16/2021 22  7 - 30 mg/dL Final     Creatinine 03/16/2021 1.00  0.66 - 1.25 mg/dL Final     GFR Estimate 03/16/2021  75  >60 mL/min/[1.73_m2] Final     GFR Estimate If Black 03/16/2021 87  >60 mL/min/[1.73_m2] Final     Calcium 03/16/2021 9.4  8.5 - 10.1 mg/dL Final     Bilirubin Total 03/16/2021 0.9  0.2 - 1.3 mg/dL Final     Albumin 03/16/2021 3.8  3.4 - 5.0 g/dL Final     Protein Total 03/16/2021 8.1  6.8 - 8.8 g/dL Final     Alkaline Phosphatase 03/16/2021 93  40 - 150 U/L Final     ALT 03/16/2021 40  0 - 70 U/L Final     AST 03/16/2021 29  0 - 45 U/L Final          ASSESSMENT:       ICD-10-CM    1. Enlarged heart  I51.7 CARDIOLOGY EVAL ADULT REFERRAL     BNP-N terminal pro     Troponin I     CRP cardiac risk     NM MPI Treadmill     Troponin I     BNP-N terminal pro   2. Tingling  R20.2 Magnesium     TSH with free T4 reflex     TSH with free T4 reflex     Magnesium   3. Mixed hyperlipidemia  E78.2 TSH with free T4 reflex     TSH with free T4 reflex   4. Essential hypertension, benign  I10 TSH with free T4 reflex     TSH with free T4 reflex   5. RBBB  I45.10    6. History of tobacco abuse  Z87.891 Pulmonary Function Test   7. Vitamin B12 deficiency (non anemic)  E53.8 Vitamin B12     Folate     Folate     Vitamin B12   8. Vitamin D deficiency  E55.9 Vitamin D Deficiency     Vitamin D Deficiency   9. TENORIO (dyspnea on exertion)  R06.00 Magnesium     Pulmonary Function Test     BNP-N terminal pro     D dimer quantitative     Troponin I     CRP cardiac risk     NM MPI Treadmill     Troponin I     D dimer quantitative     BNP-N terminal pro     Magnesium   10. History of COVID-19 on 11/20/2020  Z86.16    11. History of left heart catheterization 40 to 50 years ago at the [facility]  Z98.890 BNP-N terminal pro     Troponin I     CRP cardiac risk     NM MPI Treadmill     Troponin I     BNP-N terminal pro   12. Coronary artery disease involving native coronary artery of native heart without angina pectoris  I25.10 BNP-N terminal pro     Troponin I     CRP cardiac risk     NM MPI Treadmill     Troponin I     BNP-N terminal pro   13.  Hyperglycemia  R73.9 Hemoglobin A1c     Hemoglobin A1c   14. Regular alcohol consumption  Z78.9    15. Hypertriglyceridemia  E78.1          PLAN:   1.  We discussed the chest x-ray showing a large heart.  It was explained to him that chest x-rays can be misleading.  He was appropriately set up for an echocardiogram which showed normal heart chambers.  2.  He has been dyspneic on exertion, no chest pain.  It was suggested he have PFT's and a stress test.  He would be able to walk on a treadmill.  He reports that approximately 40 to 50 years ago, he had a cardiac catheterization at the [facility].  He was found to have nonocclusive coronary disease.  As a result, he will have a stress test.  3.  He does have a history of occasional smoking with dyspnea on exertion.  He states his lung capacity may be limited.  We will plan for PFT's.  4.  He also complains of numbness and tingling.  He does drink alcohol on a regular basis.  It was suggested we check some routine labs to see if there is a deficiency causing his numbness and tingling.  We will plan for a cardiac CRP, troponin, D-dimer, BNP, TSH, magnesium, A1c, vitamin D, folate, and B12.  5.  If his stress test, PFT's, and labs are normal, he will be seen in follow-up on an as-needed basis.  Otherwise, if testing is abnormal, he will be seen in follow-up related to these abnormalities.    Thank you for allowing me to participate in the care of your patient. Please do not hesitate to contact me if you have any questions.     Adrian Washington, DO

## 2021-04-28 ENCOUNTER — TELEPHONE (OUTPATIENT)
Dept: CARDIOLOGY | Facility: OTHER | Age: 71
End: 2021-04-28

## 2021-04-28 ENCOUNTER — OFFICE VISIT (OUTPATIENT)
Dept: CARDIOLOGY | Facility: OTHER | Age: 71
End: 2021-04-28
Attending: FAMILY MEDICINE
Payer: COMMERCIAL

## 2021-04-28 ENCOUNTER — OFFICE VISIT (OUTPATIENT)
Dept: FAMILY MEDICINE | Facility: OTHER | Age: 71
End: 2021-04-28
Attending: SURGERY
Payer: COMMERCIAL

## 2021-04-28 VITALS
DIASTOLIC BLOOD PRESSURE: 68 MMHG | TEMPERATURE: 97.1 F | HEIGHT: 68 IN | BODY MASS INDEX: 31.37 KG/M2 | HEART RATE: 60 BPM | RESPIRATION RATE: 14 BRPM | OXYGEN SATURATION: 98 % | SYSTOLIC BLOOD PRESSURE: 138 MMHG | WEIGHT: 207 LBS

## 2021-04-28 DIAGNOSIS — Z87.891 HISTORY OF TOBACCO ABUSE: ICD-10-CM

## 2021-04-28 DIAGNOSIS — E53.8 VITAMIN B12 DEFICIENCY (NON ANEMIC): ICD-10-CM

## 2021-04-28 DIAGNOSIS — E78.2 MIXED HYPERLIPIDEMIA: ICD-10-CM

## 2021-04-28 DIAGNOSIS — I51.7 ENLARGED HEART: Primary | ICD-10-CM

## 2021-04-28 DIAGNOSIS — E78.1 HYPERTRIGLYCERIDEMIA: ICD-10-CM

## 2021-04-28 DIAGNOSIS — I25.10 CORONARY ARTERY DISEASE INVOLVING NATIVE CORONARY ARTERY OF NATIVE HEART WITHOUT ANGINA PECTORIS: ICD-10-CM

## 2021-04-28 DIAGNOSIS — R20.2 TINGLING: ICD-10-CM

## 2021-04-28 DIAGNOSIS — Z20.822 COVID-19 RULED OUT: ICD-10-CM

## 2021-04-28 DIAGNOSIS — E55.9 VITAMIN D DEFICIENCY: ICD-10-CM

## 2021-04-28 DIAGNOSIS — Z20.822 COVID-19 RULED OUT: Primary | ICD-10-CM

## 2021-04-28 DIAGNOSIS — Z78.9 REGULAR ALCOHOL CONSUMPTION: ICD-10-CM

## 2021-04-28 DIAGNOSIS — I10 ESSENTIAL HYPERTENSION, BENIGN: ICD-10-CM

## 2021-04-28 DIAGNOSIS — I45.10 RIGHT BUNDLE BRANCH BLOCK: ICD-10-CM

## 2021-04-28 DIAGNOSIS — Z98.890 HISTORY OF LEFT HEART CATHETERIZATION: ICD-10-CM

## 2021-04-28 DIAGNOSIS — R06.09 DOE (DYSPNEA ON EXERTION): ICD-10-CM

## 2021-04-28 DIAGNOSIS — Z86.16 HISTORY OF COVID-19: ICD-10-CM

## 2021-04-28 DIAGNOSIS — R73.9 HYPERGLYCEMIA: ICD-10-CM

## 2021-04-28 LAB
D DIMER PPP FEU-MCNC: 0.7 UG/ML FEU (ref 0–0.5)
EST. AVERAGE GLUCOSE BLD GHB EST-MCNC: 108 MG/DL
HBA1C MFR BLD: 5.4 % (ref 0–5.6)
MAGNESIUM SERPL-MCNC: 2.3 MG/DL (ref 1.6–2.3)
NT-PROBNP SERPL-MCNC: 36 PG/ML (ref 0–125)
SARS-COV-2 RNA RESP QL NAA+PROBE: NORMAL
SPECIMEN SOURCE: NORMAL
TROPONIN I SERPL-MCNC: <0.015 UG/L (ref 0–0.04)
TSH SERPL DL<=0.005 MIU/L-ACNC: 1.19 MU/L (ref 0.4–4)

## 2021-04-28 PROCEDURE — 83735 ASSAY OF MAGNESIUM: CPT | Mod: ZL | Performed by: INTERNAL MEDICINE

## 2021-04-28 PROCEDURE — 99205 OFFICE O/P NEW HI 60 MIN: CPT | Performed by: INTERNAL MEDICINE

## 2021-04-28 PROCEDURE — 999N001182 HC STATISTIC ESTIMATED AVERAGE GLUCOSE: Mod: ZL | Performed by: INTERNAL MEDICINE

## 2021-04-28 PROCEDURE — U0005 INFEC AGEN DETEC AMPLI PROBE: HCPCS | Mod: ZL | Performed by: SURGERY

## 2021-04-28 PROCEDURE — 83036 HEMOGLOBIN GLYCOSYLATED A1C: CPT | Mod: ZL | Performed by: INTERNAL MEDICINE

## 2021-04-28 PROCEDURE — 82306 VITAMIN D 25 HYDROXY: CPT | Mod: ZL | Performed by: INTERNAL MEDICINE

## 2021-04-28 PROCEDURE — 36415 COLL VENOUS BLD VENIPUNCTURE: CPT | Mod: ZL | Performed by: INTERNAL MEDICINE

## 2021-04-28 PROCEDURE — 85379 FIBRIN DEGRADATION QUANT: CPT | Mod: ZL | Performed by: INTERNAL MEDICINE

## 2021-04-28 PROCEDURE — U0003 INFECTIOUS AGENT DETECTION BY NUCLEIC ACID (DNA OR RNA); SEVERE ACUTE RESPIRATORY SYNDROME CORONAVIRUS 2 (SARS-COV-2) (CORONAVIRUS DISEASE [COVID-19]), AMPLIFIED PROBE TECHNIQUE, MAKING USE OF HIGH THROUGHPUT TECHNOLOGIES AS DESCRIBED BY CMS-2020-01-R: HCPCS | Mod: ZL | Performed by: SURGERY

## 2021-04-28 PROCEDURE — 84443 ASSAY THYROID STIM HORMONE: CPT | Mod: ZL | Performed by: INTERNAL MEDICINE

## 2021-04-28 PROCEDURE — 84484 ASSAY OF TROPONIN QUANT: CPT | Mod: ZL | Performed by: INTERNAL MEDICINE

## 2021-04-28 PROCEDURE — G0463 HOSPITAL OUTPT CLINIC VISIT: HCPCS

## 2021-04-28 PROCEDURE — 86141 C-REACTIVE PROTEIN HS: CPT | Mod: ZL | Performed by: INTERNAL MEDICINE

## 2021-04-28 PROCEDURE — 82746 ASSAY OF FOLIC ACID SERUM: CPT | Mod: ZL | Performed by: INTERNAL MEDICINE

## 2021-04-28 PROCEDURE — 83880 ASSAY OF NATRIURETIC PEPTIDE: CPT | Mod: ZL | Performed by: INTERNAL MEDICINE

## 2021-04-28 PROCEDURE — 82607 VITAMIN B-12: CPT | Mod: ZL | Performed by: INTERNAL MEDICINE

## 2021-04-28 ASSESSMENT — MIFFLIN-ST. JEOR: SCORE: 1663.45

## 2021-04-28 ASSESSMENT — PAIN SCALES - GENERAL: PAINLEVEL: NO PAIN (0)

## 2021-04-28 NOTE — LETTER
May 3, 2021      Oswald Goel  [address] [facility] [address]        Dear ,    We are writing to inform you of your test results.    Here are your lab results.  It looks like your vitamin D is deficient.  You should consider taking 3398-9026 international units of vitamin D over-the-counter.  Nothing on here would explain the tingling you are experiencing.  Otherwise, your labs look pretty good.    Resulted Orders   CRP cardiac risk   Result Value Ref Range    CRP Cardiac Risk 1.8 mg/L      Comment:      Reference Values:  Low Risk:           <1.0 mg/L  Average Risk:       1.0-3.0 mg/L  High Risk:          >3.0 mg/L  Acute Inflammation: >10.0 mg/L     Troponin I   Result Value Ref Range    Troponin I ES <0.015 0.000 - 0.045 ug/L      Comment:      The 99th percentile for upper reference range is 0.045 ug/L.  Troponin values   in the range of 0.045 - 0.120 ug/L may be associated with risks of adverse   clinical events.     D dimer quantitative   Result Value Ref Range    D Dimer 0.7 (H) 0.0 - 0.50 ug/ml FEU      Comment:      This D-dimer assay is intended for use in conjunction with a clinical pretest   probability assessment model to exclude pulmonary embolism (PE) and deep   venous thrombosis (DVT) in outpatients suspected of PE or DVT. The cut-off   value is 0.5 ug/mL FEU.     BNP-N terminal pro   Result Value Ref Range    N-Terminal Pro Bnp 36 0 - 125 pg/mL      Comment:         Reference range shown and results flagged as abnormal are for the outpatient,   non acute settings. Establishing a baseline value for each individual patient   is useful for follow-up.  Suggested inpatient cut points for confirming diagnosis of CHF in an acute   setting are:   >450 pg/mL (age 18 to less than 50)   >900 pg/mL (age 50 to less than 75)   >1800 pg/mL (75 yrs and older)  An inpatient or emergency department NT-proPBNP <300 pg/mL effectively rules   out acute CHF, with 99% negative predictive value.      TSH with  free T4 reflex   Result Value Ref Range    TSH 1.19 0.40 - 4.00 mU/L   Magnesium   Result Value Ref Range    Magnesium 2.3 1.6 - 2.3 mg/dL   Hemoglobin A1c   Result Value Ref Range    Hemoglobin A1C 5.4 0 - 5.6 %      Comment:      Normal <5.7% Prediabetes 5.7-6.4%  Diabetes 6.5% or higher - adopted from ADA   consensus guidelines.     Vitamin D Deficiency   Result Value Ref Range    Vitamin D Deficiency screening 21 20 - 75 ug/L      Comment:      Season, race, dietary intake, and treatment affect the concentration of   25-hydroxy-Vitamin D. Values may decrease during winter months and increase   during summer months. Values 20-29 ug/L may indicate Vitamin D insufficiency   and values <20 ug/L may indicate Vitamin D deficiency.  Vitamin D determination is routinely performed by an immunoassay specific for   25 hydroxyvitamin D3.  If an individual is on vitamin D2 (ergocalciferol)   supplementation, please specify 25 OH vitamin D2 and D3 level determination by   LCMSMS test [id number].     Folate   Result Value Ref Range    Folate 28.5 >5.4 ng/mL   Vitamin B12   Result Value Ref Range    Vitamin B12 541 193 - 986 pg/mL   Estimated Average Glucose   Result Value Ref Range    Estimated Average Glucose 108 mg/dL       If you have any questions or concerns, please call the clinic at the number listed above.       Sincerely,      Adrian Washington, DO

## 2021-04-28 NOTE — LETTER
May 4, 2021      Oswald Goel  [address] [facility] [address]        Dear ,    We are writing to inform you of your test results.    {results letter list:605074}    Resulted Orders   CRP cardiac risk   Result Value Ref Range    CRP Cardiac Risk 1.8 mg/L      Comment:      Reference Values:  Low Risk:           <1.0 mg/L  Average Risk:       1.0-3.0 mg/L  High Risk:          >3.0 mg/L  Acute Inflammation: >10.0 mg/L     Troponin I   Result Value Ref Range    Troponin I ES <0.015 0.000 - 0.045 ug/L      Comment:      The 99th percentile for upper reference range is 0.045 ug/L.  Troponin values   in the range of 0.045 - 0.120 ug/L may be associated with risks of adverse   clinical events.     D dimer quantitative   Result Value Ref Range    D Dimer 0.7 (H) 0.0 - 0.50 ug/ml FEU      Comment:      This D-dimer assay is intended for use in conjunction with a clinical pretest   probability assessment model to exclude pulmonary embolism (PE) and deep   venous thrombosis (DVT) in outpatients suspected of PE or DVT. The cut-off   value is 0.5 ug/mL FEU.     BNP-N terminal pro   Result Value Ref Range    N-Terminal Pro Bnp 36 0 - 125 pg/mL      Comment:         Reference range shown and results flagged as abnormal are for the outpatient,   non acute settings. Establishing a baseline value for each individual patient   is useful for follow-up.  Suggested inpatient cut points for confirming diagnosis of CHF in an acute   setting are:   >450 pg/mL (age 18 to less than 50)   >900 pg/mL (age 50 to less than 75)   >1800 pg/mL (75 yrs and older)  An inpatient or emergency department NT-proPBNP <300 pg/mL effectively rules   out acute CHF, with 99% negative predictive value.      TSH with free T4 reflex   Result Value Ref Range    TSH 1.19 0.40 - 4.00 mU/L   Magnesium   Result Value Ref Range    Magnesium 2.3 1.6 - 2.3 mg/dL   Hemoglobin A1c   Result Value Ref Range    Hemoglobin A1C 5.4 0 - 5.6 %      Comment:       Normal <5.7% Prediabetes 5.7-6.4%  Diabetes 6.5% or higher - adopted from ADA   consensus guidelines.     Vitamin D Deficiency   Result Value Ref Range    Vitamin D Deficiency screening 21 20 - 75 ug/L      Comment:      Season, race, dietary intake, and treatment affect the concentration of   25-hydroxy-Vitamin D. Values may decrease during winter months and increase   during summer months. Values 20-29 ug/L may indicate Vitamin D insufficiency   and values <20 ug/L may indicate Vitamin D deficiency.  Vitamin D determination is routinely performed by an immunoassay specific for   25 hydroxyvitamin D3.  If an individual is on vitamin D2 (ergocalciferol)   supplementation, please specify 25 OH vitamin D2 and D3 level determination by   LCMSMS test VITD23.     Folate   Result Value Ref Range    Folate 28.5 >5.4 ng/mL   Vitamin B12   Result Value Ref Range    Vitamin B12 541 193 - 986 pg/mL   Estimated Average Glucose   Result Value Ref Range    Estimated Average Glucose 108 mg/dL       If you have any questions or concerns, please call the clinic at the number listed above.       Sincerely,      Adrian Washington, DO

## 2021-04-28 NOTE — PATIENT INSTRUCTIONS
Thank you for allowing Dr. Washington and our  team to participate in your care. Please call our office at [phone] with scheduling questions or if you need to cancel or change your appointment. With any other questions or concerns you may call Melissa cardiology nurse at [phone].       If you experience chest pain, chest pressure, chest tightness, shortness of breath, fainting, lightheadedness, nausea, vomiting, or other concerning symptoms, please report to the Emergency Department or call 911. These symptoms may be emergent, and best treated in the Emergency Department.    Follow up as needed if all test are normal.     Labs today and someone will call you for results.     You will be scheduled for a stress test to evaluate the vessels which supply blood to the heart to rule out the possibility of blockages. You will be called by the hospital scheduling department to schedule this appointment.    The hospital will call you to schedule a pulmonary function test to evaluate your lung function.

## 2021-04-28 NOTE — TELEPHONE ENCOUNTER
Good Afternoon,     It was brought to my attention that Mr. Goel is scheduled for a colonoscopy on Monday, 5-3-21.     Does the Nuclear Medicine Treadmill Stress Test need to be completed prior to his colonoscopy procedure?    Thank you for your clarification on this.    Avril Hernadez, [facility]  [phone]

## 2021-04-29 ENCOUNTER — TELEPHONE (OUTPATIENT)
Dept: FAMILY MEDICINE | Facility: OTHER | Age: 71
End: 2021-04-29

## 2021-04-29 ENCOUNTER — TELEPHONE (OUTPATIENT)
Dept: SURGERY | Facility: OTHER | Age: 71
End: 2021-04-29

## 2021-04-29 LAB
CRP SERPL HS-MCNC: 1.8 MG/L
FOLATE SERPL-MCNC: 28.5 NG/ML
LABORATORY COMMENT REPORT: NORMAL
SARS-COV-2 RNA RESP QL NAA+PROBE: NEGATIVE
SPECIMEN SOURCE: NORMAL
VIT B12 SERPL-MCNC: 541 PG/ML (ref 193–986)

## 2021-04-29 NOTE — TELEPHONE ENCOUNTER
Discussed with patient. He will call to reschedule after he schedules his stress test to select a new surgery date. Huma in OR notified of cancellation.

## 2021-04-29 NOTE — TELEPHONE ENCOUNTER
Spoke with patient his surgery for colonoscopy has been cancelled until after his stress test pre op appointment is also cancelled   Wanda Zambrano LPN

## 2021-04-29 NOTE — TELEPHONE ENCOUNTER
Dr Suazo nurse called and stated that need to cancel surgery with Dr Villa on 5/3/2021. Patient needs a stress test first then can be rescheduled. Will cancel preop

## 2021-04-30 LAB — DEPRECATED CALCIDIOL+CALCIFEROL SERPL-MC: 21 UG/L (ref 20–75)

## 2021-05-03 ENCOUNTER — HOSPITAL ENCOUNTER (OUTPATIENT)
Dept: NUCLEAR MEDICINE | Facility: HOSPITAL | Age: 71
Setting detail: NUCLEAR MEDICINE
End: 2021-05-03
Attending: INTERNAL MEDICINE
Payer: COMMERCIAL

## 2021-05-03 ENCOUNTER — ANESTHESIA (OUTPATIENT)
Dept: SURGERY | Facility: HOSPITAL | Age: 71
End: 2021-05-03

## 2021-05-03 ENCOUNTER — HOSPITAL ENCOUNTER (OUTPATIENT)
Dept: CARDIOLOGY | Facility: HOSPITAL | Age: 71
Setting detail: NUCLEAR MEDICINE
End: 2021-05-03
Attending: INTERNAL MEDICINE
Payer: COMMERCIAL

## 2021-05-03 DIAGNOSIS — I25.10 CORONARY ARTERY DISEASE INVOLVING NATIVE CORONARY ARTERY OF NATIVE HEART WITHOUT ANGINA PECTORIS: ICD-10-CM

## 2021-05-03 DIAGNOSIS — R06.09 DOE (DYSPNEA ON EXERTION): ICD-10-CM

## 2021-05-03 DIAGNOSIS — Z98.890 HISTORY OF LEFT HEART CATHETERIZATION: ICD-10-CM

## 2021-05-03 DIAGNOSIS — I51.7 ENLARGED HEART: ICD-10-CM

## 2021-05-03 LAB
CV BLOOD PRESSURE: 60 %
CV STRESS MAX HR HE: 109
NUC STRESS EJECTION FRACTION: 65 %
RATE PRESSURE PRODUCT: NORMAL
STRESS ECHO BASELINE DIASTOLIC HE: 68
STRESS ECHO BASELINE HR: 60
STRESS ECHO BASELINE SYSTOLIC BP: 144
STRESS ECHO CALCULATED PERCENT HR: 74 %
STRESS ECHO LAST STRESS DIASTOLIC BP: 92
STRESS ECHO LAST STRESS SYSTOLIC BP: 206
STRESS ECHO POST ESTIMATED WORKLOAD: 4.6 METS
STRESS ECHO POST EXERCISE DUR MIN: 2 MIN
STRESS ECHO POST EXERCISE DUR SEC: 30 SEC
STRESS ECHO TARGET HR: 148

## 2021-05-03 PROCEDURE — 93017 CV STRESS TEST TRACING ONLY: CPT | Performed by: INTERNAL MEDICINE

## 2021-05-03 PROCEDURE — A9500 TC99M SESTAMIBI: HCPCS | Performed by: RADIOLOGY

## 2021-05-03 PROCEDURE — 343N000001 HC RX 343: Performed by: RADIOLOGY

## 2021-05-03 PROCEDURE — 258N000003 HC RX IP 258 OP 636: Performed by: INTERNAL MEDICINE

## 2021-05-03 PROCEDURE — 93016 CV STRESS TEST SUPVJ ONLY: CPT | Performed by: INTERNAL MEDICINE

## 2021-05-03 PROCEDURE — 78452 HT MUSCLE IMAGE SPECT MULT: CPT

## 2021-05-03 PROCEDURE — 93018 CV STRESS TEST I&R ONLY: CPT | Performed by: INTERNAL MEDICINE

## 2021-05-03 RX ORDER — SODIUM CHLORIDE 9 MG/ML
INJECTION, SOLUTION INTRAVENOUS ONCE
Status: COMPLETED | OUTPATIENT
Start: 2021-05-03 | End: 2021-05-03

## 2021-05-03 RX ADMIN — SODIUM CHLORIDE: 9 INJECTION, SOLUTION INTRAVENOUS at 08:15

## 2021-05-03 RX ADMIN — Medication 10.7 MILLICURIE: at 06:44

## 2021-05-03 RX ADMIN — Medication 32.3 MILLICURIE: at 08:27

## 2021-05-11 DIAGNOSIS — I10 ESSENTIAL HYPERTENSION, BENIGN: ICD-10-CM

## 2021-05-11 RX ORDER — LISINOPRIL 20 MG/1
TABLET ORAL
Qty: 90 TABLET | Refills: 0 | Status: SHIPPED | OUTPATIENT
Start: 2021-05-11 | End: 2021-08-31

## 2021-05-11 NOTE — TELEPHONE ENCOUNTER
Lisinopril  Last Written Prescription Date: 2/16/21  Last Fill Quantity: 90 # of Refills: 0  Last Office Visit: 4/30/21

## 2021-05-26 ENCOUNTER — HOSPITAL ENCOUNTER (OUTPATIENT)
Dept: RESPIRATORY THERAPY | Facility: HOSPITAL | Age: 71
Discharge: HOME | End: 2021-05-26
Attending: INTERNAL MEDICINE | Admitting: INTERNAL MEDICINE
Payer: COMMERCIAL

## 2021-05-26 DIAGNOSIS — Z87.891 HISTORY OF TOBACCO ABUSE: ICD-10-CM

## 2021-05-26 DIAGNOSIS — R06.09 DOE (DYSPNEA ON EXERTION): ICD-10-CM

## 2021-05-26 LAB
COHGB MFR BLD: 1.4 % (ref 0–2)
HGB BLD-MCNC: 14.2 G/DL (ref 13.3–17.7)

## 2021-05-26 PROCEDURE — 36415 COLL VENOUS BLD VENIPUNCTURE: CPT | Performed by: INTERNAL MEDICINE

## 2021-05-26 PROCEDURE — 94726 PLETHYSMOGRAPHY LUNG VOLUMES: CPT | Mod: 26 | Performed by: INTERNAL MEDICINE

## 2021-05-26 PROCEDURE — 94729 DIFFUSING CAPACITY: CPT

## 2021-05-26 PROCEDURE — 94729 DIFFUSING CAPACITY: CPT | Mod: 26 | Performed by: INTERNAL MEDICINE

## 2021-05-26 PROCEDURE — 94060 EVALUATION OF WHEEZING: CPT

## 2021-05-26 PROCEDURE — 82375 ASSAY CARBOXYHB QUANT: CPT | Performed by: INTERNAL MEDICINE

## 2021-05-26 PROCEDURE — 94060 EVALUATION OF WHEEZING: CPT | Mod: 26 | Performed by: INTERNAL MEDICINE

## 2021-05-26 PROCEDURE — 85018 HEMOGLOBIN: CPT | Performed by: INTERNAL MEDICINE

## 2021-05-26 PROCEDURE — 250N000009 HC RX 250: Performed by: INTERNAL MEDICINE

## 2021-05-26 PROCEDURE — 94726 PLETHYSMOGRAPHY LUNG VOLUMES: CPT

## 2021-05-26 RX ORDER — ALBUTEROL SULFATE 0.83 MG/ML
2.5 SOLUTION RESPIRATORY (INHALATION) ONCE
Status: COMPLETED | OUTPATIENT
Start: 2021-05-26 | End: 2021-05-26

## 2021-05-26 RX ADMIN — ALBUTEROL SULFATE 2.5 MG: 2.5 SOLUTION RESPIRATORY (INHALATION) at 08:02

## 2021-06-04 ENCOUNTER — PATIENT OUTREACH (OUTPATIENT)
Dept: CARDIOLOGY | Facility: OTHER | Age: 71
End: 2021-06-04

## 2021-06-07 NOTE — PROGRESS NOTES
Outreach to patient to let him know he can follow up with Dr. Washington as needed and PCP regarding minimal COPD. Verbalized understanding. No other questions at this time.

## 2021-06-07 NOTE — TELEPHONE ENCOUNTER
Pt called back and was given results below. Pt wondering if he is to follow up with Dr Washington or PCP? Please advise. Thank you!

## 2021-06-28 ENCOUNTER — TELEPHONE (OUTPATIENT)
Dept: SURGERY | Facility: OTHER | Age: 71
End: 2021-06-28

## 2021-06-28 DIAGNOSIS — Z01.818 PREOP TESTING: Primary | ICD-10-CM

## 2021-06-28 NOTE — TELEPHONE ENCOUNTER
Please see note from .    Adrian Washington, Sue Polanco, CLIFF Rogers!   Thanks for the follow-up!  He should be okay to proceed with his colonoscopy.  He could be scheduled anytime.  His testing came back normal.     Dr. Washington            Left message to patient to call back to schedule colonoscopy.

## 2021-06-28 NOTE — TELEPHONE ENCOUNTER
Patient called and would like to schedule colonoscopy with Dr Villa, patient had been scheduled 5/3/2021 but had to do other tests first. Please call back at [phone]

## 2021-06-29 ENCOUNTER — PREP FOR PROCEDURE (OUTPATIENT)
Dept: SURGERY | Facility: OTHER | Age: 71
End: 2021-06-29

## 2021-06-29 DIAGNOSIS — Z12.11 SCREEN FOR COLON CANCER: Primary | ICD-10-CM

## 2021-06-29 NOTE — TELEPHONE ENCOUNTER
Referral received for colonoscopy for screening for colon cancer.   This patient was approved by Sonia, surgery education RN for meet and greet colonoscopy without preop appointment. However, I did schedule him a pre-op. Patient was also cleared by  to proceed with colonoscopy.  Patient scheduled for colonoscopy on 7/29/21 with Dr. Villa at [facility] with gatorade bowel prep.   Instructions given via phone and instructions mailed to patient with surgery handbook.   COVID-19 test: 7/24/21  Preop: 7/23/21  Sue Prieto LPN

## 2021-07-01 ENCOUNTER — TELEPHONE (OUTPATIENT)
Dept: SURGERY | Facility: OTHER | Age: 71
End: 2021-07-01

## 2021-07-01 NOTE — TELEPHONE ENCOUNTER
Voicemail received from patient stating that he would like to reschedule his colonoscopy that is scheduled for 7/29/21 with . Patient stated that he has a dentist appointment that day. Attempted to reach patient to reschedule. Left message for patient to return call. Patient will need to have covid testing, and preop appointment rescheduled as well. Sue Prieto LPN

## 2021-07-01 NOTE — TELEPHONE ENCOUNTER
Patient left voicemail at 12:36pm stating that he was going to keep his colonoscopy as scheduled, and reschedule his dental appointment. Patient stated he does not need a call back. Just wanted to let me know that he doesn't need to change colonoscopy date. Sue Prieto, LPN

## 2021-07-20 NOTE — PROGRESS NOTES
[facility] [place]  [address]  [facility] [place] [address]  Phone: [phone]  Primary Provider: ALDEN Schilling  Pre-op Performing Provider: ALDEN SCHILLING      PREOPERATIVE EVALUATION:  Today's date: 7/23/2021    Oswald Goel is a 70 year old male who presents for a preoperative evaluation.    Surgical Information:  Surgery/Procedure: Colonoscopy  [place] [facility] [place]: Dr. Villa  Surgery Date: 7/29/21  Time of Surgery: TBD  Where patient plans to recover: At home with family  Fax number for surgical facility: Note does not need to be faxed, will be available electronically in Epic.    Type of Anesthesia Anticipated: to be determined    Assessment & Plan     The proposed surgical procedure is considered LOW risk.    Preop general physical exam    - Basic metabolic panel; Future  - CBC with platelets and differential; Future  - CBC with platelets and differential  - Basic metabolic panel         Risks and Recommendations:  The patient has the following additional risks and recommendations for perioperative complications:   - No identified additional risk factors other than previously addressed    Medication Instructions:  Patient will take the amlodipine the morning of the procedure but not the ACE inhibitor because patient is using it for hypertension and not congestive heart failure    RECOMMENDATION:  APPROVAL GIVEN to proceed with proposed procedure, without further diagnostic evaluation.  Patient can do 4 METS without problems.  We will get his Covid test tomorrow.  He will be n.p.o. after midnight                      Subjective     HPI related to upcoming procedure: This will be his third colonoscopy his first 1 did have polyps      Preop Questions 7/23/2021   1. Have you ever had a heart attack or stroke? No   2. Have you ever had surgery on your heart or blood vessels, such as a stent placement, a coronary artery bypass, or surgery on an artery in your head, neck, heart, or legs?  No   3. Do you have chest pain with activity? No   4. Do you have a history of  heart failure? No   5. Do you currently have a cold, bronchitis or symptoms of other infection? No   6. Do you have a cough, shortness of breath, or wheezing? No   7. Do you or anyone in your family have previous history of blood clots? No   8. Do you or does anyone in your family have a serious bleeding problem such as prolonged bleeding following surgeries or cuts? No   9. Have you ever had problems with anemia or been told to take iron pills? YES -    10. Have you had any abnormal blood loss such as black, tarry or bloody stools? No   11. Have you ever had a blood transfusion? No   12. Are you willing to have a blood transfusion if it is medically needed before, during, or after your surgery? Yes   13. Have you or any of your relatives ever had problems with anesthesia? No   14. Do you have sleep apnea, excessive snoring or daytime drowsiness? No   15. Do you have any artifical heart valves or other implanted medical devices like a pacemaker, defibrillator, or continuous glucose monitor? No   16. Do you have artificial joints? YES - Shoulder and hip   17. Are you allergic to latex? No     Health Care Directive:  Patient does not have a Health Care Directive or Living Will: Discussed advance care planning with patient; however, patient declined at this time.    Preoperative Review of :   reviewed - no record of controlled substances prescribed.      Status of Chronic Conditions:  See problem list for active medical problems.  Problems all longstanding and stable, except as noted/documented.  See ROS for pertinent symptoms related to these conditions.      Review of Systems  CONSTITUTIONAL: NEGATIVE for fever, chills, change in weight  ENT/MOUTH: NEGATIVE for ear, mouth and throat problems  RESP: NEGATIVE for significant cough or SOB  CV: NEGATIVE for chest pain, palpitations or peripheral edema    Patient Active Problem List     Diagnosis Date Noted     Screen for colon cancer 06/29/2021     Priority: Medium     Added automatically from request for surgery [id number]       Tingling 04/28/2021     Priority: Medium     History of tobacco abuse 04/28/2021     Priority: Medium     TENORIO (dyspnea on exertion) 04/28/2021     Priority: Medium     History of left heart catheterization 40 to 50 years ago at the [facility] 04/28/2021     Priority: Medium     Coronary artery disease involving native coronary artery of native heart without angina pectoris 04/28/2021     Priority: Medium     History of COVID-19 on 11/20/2020 04/28/2021     Priority: Medium     Regular alcohol consumption 04/28/2021     Priority: Medium     Hypertriglyceridemia 04/28/2021     Priority: Medium     Colon cancer screening 03/25/2021     Priority: Medium     Added automatically from request for surgery [id number]       ACP (advance care planning) 08/10/2016     Priority: Medium     Advance Care Planning 8/10/2016: ACP Review of Chart / Resources Provided:  Reviewed chart for advance care plan.  Oswald Goel has no plan or code status on file. Discussed available resources and provided with information. Confirmed code status reflects current choices pending further ACP discussions.  Confirmed/documented legally designated decision makers.  Added by YOANDY NASH             Primary localized osteoarthrosis, pelvic region and thigh 01/20/2015     Priority: Medium     Advanced care planning/counseling discussion 03/30/2012     Priority: Medium     Dysthymic disorder 02/22/2011     Priority: Medium     Essential hypertension, benign 02/22/2011     Priority: Medium     RBBB 02/22/2011     Priority: Medium     Nonallopathic lesion of cervical region 08/27/2008     Priority: Medium     Problem list name updated by automated process. Provider to review       Hypertrophy of prostate without urinary obstruction 10/10/2007     Priority: Medium     Problem list name updated by automated process.  Provider to review       Mixed hyperlipidemia 12/29/1999     Priority: Medium      Past Medical History:   Diagnosis Date     Depressive disorder, not elsewhere classified 02/22/2011     Hypertension, Benign 02/22/2011     Hypertrophy (benign) of prostate without urinary o 10/10/2007     Nonallopathic lesion of cervical region, not elsewhere classified 08/27/2008     Pure hypercholesterolemia 12/29/1999     RBBB 02/22/2011     Past Surgical History:   Procedure Laterality Date     COLONOSCOPY  03-    repeat 10 yrs     COLONOSCOPY  2005     HERNIA REPAIR       left arthroscopy       RELEASE CARPAL TUNNEL      RT     TONSILLECTOMY       TRANSPOSITION ULNAR NERVE (ELBOW) Left 11/19/2015    Procedure: TRANSPOSITION ULNAR NERVE (ELBOW);  Surgeon: Mahesh Capps MD;  Location: [facility]     Current Outpatient Medications   Medication Sig Dispense Refill     acetaminophen (TYLENOL) 500 MG tablet Take 500-1,000 mg by mouth every 6 hours as needed for mild pain       amLODIPine (NORVASC) 2.5 MG tablet Take with the 5 mg tablet 90 tablet 3     amLODIPine (NORVASC) 5 MG tablet Take 1 tablet (5 mg) by mouth daily take with the 2.5 mg tablet daily 90 tablet 3     amoxicillin (AMOXIL) 500 MG capsule Take 4 capsules 30-60 minutes before procedure (Patient not taking: Reported on 8/25/2020) 4 capsule 0     Ferrous Gluconate 240 (27 Fe) MG TABS Take 1 mg by mouth daily       fish oil-omega-3 fatty acids 1000 MG capsule Take 1 g by mouth daily       lisinopril (ZESTRIL) 20 MG tablet TAKE 1 TABLET BY MOUTH ONCE DAILY. 90 tablet 0     Milk Thistle-Dand-Fennel-Licor (MILK THISTLE XTRA) CAPS capsule        mirtazapine (REMERON) 15 MG tablet Take 1 tablet (15 mg) by mouth At Bedtime (Patient not taking: Reported on 3/11/2021) 30 tablet 5     Multiple Vitamin (DAILY MULTIVITAMIN PO) Take 1 tablet by Oral route every day with food       simvastatin (ZOCOR) 40 MG tablet Take 1 tablet (40 mg) by mouth At Bedtime 90 tablet 3      "Turmeric 500 MG TABS        vitamin C (ASCORBIC ACID) 1000 MG TABS Take 1,000 mg by mouth daily         Allergies   Allergen Reactions     Animal Dander      Deer hair        Social History     Tobacco Use     Smoking status: Former Smoker     Types: Cigarettes     Smokeless tobacco: Former User     Tobacco comment: no passive exposure, tried to quit-yes   Substance Use Topics     Alcohol use: Yes     Comment: 2 glasses, daily, yesterday     Family History   Problem Relation Age of Onset     Cancer Mother         Pancreatic     Heart Disease Mother         Heart Surgery     History   Drug Use No         Objective     /68 (BP Location: Left arm, Patient Position: Sitting, Cuff Size: Adult Large)   Pulse 58   Temp 97.5  F (36.4  C) (Tympanic)   Ht 1.727 m (5' 8\")   Wt 91.2 kg (201 lb)   SpO2 96%   BMI 30.56 kg/m      Physical Exam    GENERAL APPEARANCE: healthy, alert and no distress     EYES: EOMI,  PERRL     HENT: ear canals and TM's normal and nose and mouth without ulcers or lesions     NECK: no adenopathy, no asymmetry, masses, or scars and thyroid normal to palpation     RESP: lungs clear to auscultation - no rales, rhonchi or wheezes     CV: regular rates and rhythm, normal S1 S2, no S3 or S4 and no murmur, click or rub     ABDOMEN:  soft, nontender, no HSM or masses and bowel sounds normal     MS: extremities normal- no gross deformities noted, no evidence of inflammation in joints, FROM in all extremities.     SKIN: no suspicious lesions or rashes     NEURO: Normal strength and tone, sensory exam grossly normal, mentation intact and speech normal     PSYCH: mentation appears normal. and affect normal     LYMPHATICS: No cervical adenopathy         Diagnostics:  Recent Results (from the past 24 hour(s))   Basic metabolic panel    Collection Time: 07/23/21 10:18 AM   Result Value Ref Range    Sodium 139 133 - 144 mmol/L    Potassium 3.9 3.4 - 5.3 mmol/L    Chloride 107 94 - 109 mmol/L    Carbon " Dioxide (CO2) 25 20 - 32 mmol/L    Anion Gap 7 3 - 14 mmol/L    Urea Nitrogen 17 7 - 30 mg/dL    Creatinine 1.02 0.66 - 1.25 mg/dL    Calcium 9.1 8.5 - 10.1 mg/dL    Glucose 94 70 - 99 mg/dL    GFR Estimate 73 >60 mL/min/1.73m2   CBC with platelets and differential    Collection Time: 07/23/21 10:18 AM   Result Value Ref Range    WBC Count 6.5 4.0 - 11.0 10e3/uL    RBC Count 4.13 (L) 4.40 - 5.90 10e6/uL    Hemoglobin 13.3 13.3 - 17.7 g/dL    Hematocrit 39.2 (L) 40.0 - 53.0 %    MCV 95 78 - 100 fL    MCH 32.2 26.5 - 33.0 pg    MCHC 33.9 31.5 - 36.5 g/dL    RDW 13.1 10.0 - 15.0 %    Platelet Count 224 150 - 450 10e3/uL    % Neutrophils 71 %    % Lymphocytes 18 %    % Monocytes 9 %    % Eosinophils 2 %    % Basophils 0 %    % Immature Granulocytes 0 %    NRBCs per 100 WBC 0 <1 /100    Absolute Neutrophils 4.6 1.6 - 8.3 10e3/uL    Absolute Lymphocytes 1.2 0.8 - 5.3 10e3/uL    Absolute Monocytes 0.6 0.0 - 1.3 10e3/uL    Absolute Eosinophils 0.1 0.0 - 0.7 10e3/uL    Absolute Basophils 0.0 0.0 - 0.2 10e3/uL    Absolute Immature Granulocytes 0.0 <=0.0 10e3/uL    Absolute NRBCs 0.0 10e3/uL      EKG-patient had an echo in March of this year and a nuclear med stress test which was negative for inducible myocardial ischemia and LV function was normal EF was 60% at rest and 65% at stress    Revised Cardiac Risk Index (RCRI):  The patient has the following serious cardiovascular risks for perioperative complications:   - No serious cardiac risks = 0 points     RCRI Interpretation: 0 points: Class I (very low risk - 0.4% complication rate)           Signed Electronically by: ALDEN Lowery MD  Copy of this evaluation report is provided to requesting physician.

## 2021-07-20 NOTE — H&P (VIEW-ONLY)
[facility] [place]  [address]  [place] [address]  Phone: [phone]  Primary Provider: ALDEN Schilling  Pre-op Performing Provider: ALDEN SCHILLING      PREOPERATIVE EVALUATION:  Today's date: 7/23/2021    Oswald Goel is a 70 year old male who presents for a preoperative evaluation.    Surgical Information:  Surgery/Procedure: Colonoscopy  Surgery Location: [facility] [place]  Surgeon: Dr. Villa  Surgery Date: 7/29/21  Time of Surgery: TBD  Where patient plans to recover: At home with family  Fax number for surgical facility: Note does not need to be faxed, will be available electronically in Epic.    Type of Anesthesia Anticipated: to be determined    Assessment & Plan     The proposed surgical procedure is considered LOW risk.    Preop general physical exam    - Basic metabolic panel; Future  - CBC with platelets and differential; Future  - CBC with platelets and differential  - Basic metabolic panel         Risks and Recommendations:  The patient has the following additional risks and recommendations for perioperative complications:   - No identified additional risk factors other than previously addressed    Medication Instructions:  Patient will take the amlodipine the morning of the procedure but not the ACE inhibitor because patient is using it for hypertension and not congestive heart failure    RECOMMENDATION:  APPROVAL GIVEN to proceed with proposed procedure, without further diagnostic evaluation.  Patient can do 4 METS without problems.  We will get his Covid test tomorrow.  He will be n.p.o. after midnight                      Subjective     HPI related to upcoming procedure: This will be his third colonoscopy his first 1 did have polyps      Preop Questions 7/23/2021   1. Have you ever had a heart attack or stroke? No   2. Have you ever had surgery on your heart or blood vessels, such as a stent placement, a coronary artery bypass, or surgery on an artery in your head, neck, heart, or legs?  No   3. Do you have chest pain with activity? No   4. Do you have a history of  heart failure? No   5. Do you currently have a cold, bronchitis or symptoms of other infection? No   6. Do you have a cough, shortness of breath, or wheezing? No   7. Do you or anyone in your family have previous history of blood clots? No   8. Do you or does anyone in your family have a serious bleeding problem such as prolonged bleeding following surgeries or cuts? No   9. Have you ever had problems with anemia or been told to take iron pills? YES -    10. Have you had any abnormal blood loss such as black, tarry or bloody stools? No   11. Have you ever had a blood transfusion? No   12. Are you willing to have a blood transfusion if it is medically needed before, during, or after your surgery? Yes   13. Have you or any of your relatives ever had problems with anesthesia? No   14. Do you have sleep apnea, excessive snoring or daytime drowsiness? No   15. Do you have any artifical heart valves or other implanted medical devices like a pacemaker, defibrillator, or continuous glucose monitor? No   16. Do you have artificial joints? YES - Shoulder and hip   17. Are you allergic to latex? No     Health Care Directive:  Patient does not have a Health Care Directive or Living Will: Discussed advance care planning with patient; however, patient declined at this time.    Preoperative Review of :   reviewed - no record of controlled substances prescribed.      Status of Chronic Conditions:  See problem list for active medical problems.  Problems all longstanding and stable, except as noted/documented.  See ROS for pertinent symptoms related to these conditions.      Review of Systems  CONSTITUTIONAL: NEGATIVE for fever, chills, change in weight  ENT/MOUTH: NEGATIVE for ear, mouth and throat problems  RESP: NEGATIVE for significant cough or SOB  CV: NEGATIVE for chest pain, palpitations or peripheral edema    Patient Active Problem List     Diagnosis Date Noted     Screen for colon cancer 06/29/2021     Priority: Medium     Added automatically from request for surgery [id number]       Tingling 04/28/2021     Priority: Medium     History of tobacco abuse 04/28/2021     Priority: Medium     TENORIO (dyspnea on exertion) 04/28/2021     Priority: Medium     History of left heart catheterization 40 to 50 years ago at the [facility] 04/28/2021     Priority: Medium     Coronary artery disease involving native coronary artery of native heart without angina pectoris 04/28/2021     Priority: Medium     History of COVID-19 on 11/20/2020 04/28/2021     Priority: Medium     Regular alcohol consumption 04/28/2021     Priority: Medium     Hypertriglyceridemia 04/28/2021     Priority: Medium     Colon cancer screening 03/25/2021     Priority: Medium     Added automatically from request for surgery [id number]       ACP (advance care planning) 08/10/2016     Priority: Medium     Advance Care Planning 8/10/2016: ACP Review of Chart / Resources Provided:  Reviewed chart for advance care plan.  Oswald Goel has no plan or code status on file. Discussed available resources and provided with information. Confirmed code status reflects current choices pending further ACP discussions.  Confirmed/documented legally designated decision makers.  Added by YOANDY NASH             Primary localized osteoarthrosis, pelvic region and thigh 01/20/2015     Priority: Medium     Advanced care planning/counseling discussion 03/30/2012     Priority: Medium     Dysthymic disorder 02/22/2011     Priority: Medium     Essential hypertension, benign 02/22/2011     Priority: Medium     RBBB 02/22/2011     Priority: Medium     Nonallopathic lesion of cervical region 08/27/2008     Priority: Medium     Problem list name updated by automated process. Provider to review       Hypertrophy of prostate without urinary obstruction 10/10/2007     Priority: Medium     Problem list name updated by automated process.  Provider to review       Mixed hyperlipidemia 12/29/1999     Priority: Medium      Past Medical History:   Diagnosis Date     Depressive disorder, not elsewhere classified 02/22/2011     Hypertension, Benign 02/22/2011     Hypertrophy (benign) of prostate without urinary o 10/10/2007     Nonallopathic lesion of cervical region, not elsewhere classified 08/27/2008     Pure hypercholesterolemia 12/29/1999     RBBB 02/22/2011     Past Surgical History:   Procedure Laterality Date     COLONOSCOPY  03-    repeat 10 yrs     COLONOSCOPY  2005     HERNIA REPAIR       left arthroscopy       RELEASE CARPAL TUNNEL      RT     TONSILLECTOMY       TRANSPOSITION ULNAR NERVE (ELBOW) Left 11/19/2015    Procedure: TRANSPOSITION ULNAR NERVE (ELBOW);  Surgeon: Mahesh Capps MD;  Location: [facility]     Current Outpatient Medications   Medication Sig Dispense Refill     acetaminophen (TYLENOL) 500 MG tablet Take 500-1,000 mg by mouth every 6 hours as needed for mild pain       amLODIPine (NORVASC) 2.5 MG tablet Take with the 5 mg tablet 90 tablet 3     amLODIPine (NORVASC) 5 MG tablet Take 1 tablet (5 mg) by mouth daily take with the 2.5 mg tablet daily 90 tablet 3     amoxicillin (AMOXIL) 500 MG capsule Take 4 capsules 30-60 minutes before procedure (Patient not taking: Reported on 8/25/2020) 4 capsule 0     Ferrous Gluconate 240 (27 Fe) MG TABS Take 1 mg by mouth daily       fish oil-omega-3 fatty acids 1000 MG capsule Take 1 g by mouth daily       lisinopril (ZESTRIL) 20 MG tablet TAKE 1 TABLET BY MOUTH ONCE DAILY. 90 tablet 0     Milk Thistle-Dand-Fennel-Licor (MILK THISTLE XTRA) CAPS capsule        mirtazapine (REMERON) 15 MG tablet Take 1 tablet (15 mg) by mouth At Bedtime (Patient not taking: Reported on 3/11/2021) 30 tablet 5     Multiple Vitamin (DAILY MULTIVITAMIN PO) Take 1 tablet by Oral route every day with food       simvastatin (ZOCOR) 40 MG tablet Take 1 tablet (40 mg) by mouth At Bedtime 90 tablet 3      "Turmeric 500 MG TABS        vitamin C (ASCORBIC ACID) 1000 MG TABS Take 1,000 mg by mouth daily         Allergies   Allergen Reactions     Animal Dander      Deer hair        Social History     Tobacco Use     Smoking status: Former Smoker     Types: Cigarettes     Smokeless tobacco: Former User     Tobacco comment: no passive exposure, tried to quit-yes   Substance Use Topics     Alcohol use: Yes     Comment: 2 glasses, daily, yesterday     Family History   Problem Relation Age of Onset     Cancer Mother         Pancreatic     Heart Disease Mother         Heart Surgery     History   Drug Use No         Objective     /68 (BP Location: Left arm, Patient Position: Sitting, Cuff Size: Adult Large)   Pulse 58   Temp 97.5  F (36.4  C) (Tympanic)   Ht 1.727 m (5' 8\")   Wt 91.2 kg (201 lb)   SpO2 96%   BMI 30.56 kg/m      Physical Exam    GENERAL APPEARANCE: healthy, alert and no distress     EYES: EOMI,  PERRL     HENT: ear canals and TM's normal and nose and mouth without ulcers or lesions     NECK: no adenopathy, no asymmetry, masses, or scars and thyroid normal to palpation     RESP: lungs clear to auscultation - no rales, rhonchi or wheezes     CV: regular rates and rhythm, normal S1 S2, no S3 or S4 and no murmur, click or rub     ABDOMEN:  soft, nontender, no HSM or masses and bowel sounds normal     MS: extremities normal- no gross deformities noted, no evidence of inflammation in joints, FROM in all extremities.     SKIN: no suspicious lesions or rashes     NEURO: Normal strength and tone, sensory exam grossly normal, mentation intact and speech normal     PSYCH: mentation appears normal. and affect normal     LYMPHATICS: No cervical adenopathy         Diagnostics:  Recent Results (from the past 24 hour(s))   Basic metabolic panel    Collection Time: 07/23/21 10:18 AM   Result Value Ref Range    Sodium 139 133 - 144 mmol/L    Potassium 3.9 3.4 - 5.3 mmol/L    Chloride 107 94 - 109 mmol/L    Carbon " Dioxide (CO2) 25 20 - 32 mmol/L    Anion Gap 7 3 - 14 mmol/L    Urea Nitrogen 17 7 - 30 mg/dL    Creatinine 1.02 0.66 - 1.25 mg/dL    Calcium 9.1 8.5 - 10.1 mg/dL    Glucose 94 70 - 99 mg/dL    GFR Estimate 73 >60 mL/min/1.73m2   CBC with platelets and differential    Collection Time: 07/23/21 10:18 AM   Result Value Ref Range    WBC Count 6.5 4.0 - 11.0 10e3/uL    RBC Count 4.13 (L) 4.40 - 5.90 10e6/uL    Hemoglobin 13.3 13.3 - 17.7 g/dL    Hematocrit 39.2 (L) 40.0 - 53.0 %    MCV 95 78 - 100 fL    MCH 32.2 26.5 - 33.0 pg    MCHC 33.9 31.5 - 36.5 g/dL    RDW 13.1 10.0 - 15.0 %    Platelet Count 224 150 - 450 10e3/uL    % Neutrophils 71 %    % Lymphocytes 18 %    % Monocytes 9 %    % Eosinophils 2 %    % Basophils 0 %    % Immature Granulocytes 0 %    NRBCs per 100 WBC 0 <1 /100    Absolute Neutrophils 4.6 1.6 - 8.3 10e3/uL    Absolute Lymphocytes 1.2 0.8 - 5.3 10e3/uL    Absolute Monocytes 0.6 0.0 - 1.3 10e3/uL    Absolute Eosinophils 0.1 0.0 - 0.7 10e3/uL    Absolute Basophils 0.0 0.0 - 0.2 10e3/uL    Absolute Immature Granulocytes 0.0 <=0.0 10e3/uL    Absolute NRBCs 0.0 10e3/uL      EKG-patient had an echo in March of this year and a nuclear med stress test which was negative for inducible myocardial ischemia and LV function was normal EF was 60% at rest and 65% at stress    Revised Cardiac Risk Index (RCRI):  The patient has the following serious cardiovascular risks for perioperative complications:   - No serious cardiac risks = 0 points     RCRI Interpretation: 0 points: Class I (very low risk - 0.4% complication rate)           Signed Electronically by: ALDEN Lowery MD  Copy of this evaluation report is provided to requesting physician.

## 2021-07-22 ENCOUNTER — ANESTHESIA EVENT (OUTPATIENT)
Dept: SURGERY | Facility: HOSPITAL | Age: 71
End: 2021-07-22
Payer: COMMERCIAL

## 2021-07-22 ASSESSMENT — LIFESTYLE VARIABLES: TOBACCO_USE: 1

## 2021-07-22 NOTE — ANESTHESIA PREPROCEDURE EVALUATION
Anesthesia Pre-Procedure Evaluation    Patient: Oswald Goel   MRN: [id number] : 1950        Preoperative Diagnosis: Screen for colon cancer [Z12.11]   Procedure : Procedure(s):  Colonoscopy, possible biopsy, possible polypectomy     Past Medical History:   Diagnosis Date     Depressive disorder, not elsewhere classified 2011     Hypertension, Benign 2011     Hypertrophy (benign) of prostate without urinary o 10/10/2007     Nonallopathic lesion of cervical region, not elsewhere classified 2008     Pure hypercholesterolemia 1999     RBBB 2011      Past Surgical History:   Procedure Laterality Date     COLONOSCOPY  2011    repeat 10 yrs     COLONOSCOPY  2005     HERNIA REPAIR       left arthroscopy       RELEASE CARPAL TUNNEL      RT     TONSILLECTOMY       TRANSPOSITION ULNAR NERVE (ELBOW) Left 2015    Procedure: TRANSPOSITION ULNAR NERVE (ELBOW);  Surgeon: Mahesh Capps MD;  Location: [facility]      Allergies   Allergen Reactions     Animal Dander      Deer hair      Social History     Tobacco Use     Smoking status: Former Smoker     Types: Cigarettes     Smokeless tobacco: Former User     Tobacco comment: no passive exposure, tried to quit-yes   Substance Use Topics     Alcohol use: Yes     Comment: 2 glasses, daily, yesterday      Wt Readings from Last 1 Encounters:   21 93.9 kg (207 lb)        Anesthesia Evaluation   Pt has had prior anesthetic. Type: General and MAC.        ROS/MED HX  ENT/Pulmonary:     (+) tobacco use, Past use,     Neurologic:     (+) peripheral neuropathy,     Cardiovascular:     (+) Dyslipidemia hypertension--CAD ---TENORIO. Previous cardiac testing   Echo: Date: 3/2021 Results:  No pericardial effusion is present.  Global and regional left ventricular function is normal with an EF of 60-65%.  Mild concentric wall thickening consistent with left ventricular hypertrophy  is present.  The right ventricle is normal size.  Both atria  appear normal.  The mitral valve is normal.  Mild to moderate aortic insufficiency is present.  The pulmonic valve is normal.  The tricuspid valve is normal.  The aorta root is normal.  Left ventricular size is normal.  ______________________________________________________________________________  Stress Test: Date: 05/03/21 Results:  EF 60%  ECG Reviewed: Date: 04/28/2021 Results:  SR with RBBB  Cath:  Date: Results:      METS/Exercise Tolerance: 4 - Raking leaves, gardening    Hematologic:       Musculoskeletal: Comment: Osteoarthritis      GI/Hepatic:     (+) bowel prep,     Renal/Genitourinary:     (+) BPH,     Endo:  - neg endo ROS     Psychiatric/Substance Use: Comment: Daily EtOH    (+) psychiatric history depression     Infectious Disease:  - neg infectious disease ROS     Malignancy:  - neg malignancy ROS     Other:  - neg other ROS          Physical Exam    Airway        Mallampati: I   TM distance: > 3 FB   Neck ROM: full   Mouth opening: > 3 cm    Respiratory Devices and Support         Dental       (+) partials      Cardiovascular   cardiovascular exam normal       Rhythm and rate: regular and normal     Pulmonary   pulmonary exam normal        breath sounds clear to auscultation           OUTSIDE LABS:  CBC:   Lab Results   Component Value Date    WBC 5.0 03/16/2021    WBC 5.2 05/24/2019    HGB 14.2 05/26/2021    HGB 14.0 03/16/2021    HCT 41.9 03/16/2021    HCT 38.0 (L) 05/24/2019     03/16/2021     05/24/2019     BMP:   Lab Results   Component Value Date     03/16/2021     11/25/2019    POTASSIUM 3.9 03/16/2021    POTASSIUM 4.2 11/25/2019    CHLORIDE 106 03/16/2021    CHLORIDE 107 11/25/2019    CO2 28 03/16/2021    CO2 28 11/25/2019    BUN 22 03/16/2021    BUN 13 11/25/2019    CR 1.00 03/16/2021    CR 0.95 11/25/2019    GLC 99 03/16/2021     (H) 11/25/2019     COAGS:   Lab Results   Component Value Date    INR 1.00 02/21/2017     POC: No results found for: BGM, HCG,  HCGS  HEPATIC:   Lab Results   Component Value Date    ALBUMIN 3.8 03/16/2021    PROTTOTAL 8.1 03/16/2021    ALT 40 03/16/2021    AST 29 03/16/2021    ALKPHOS 93 03/16/2021    BILITOTAL 0.9 03/16/2021     OTHER:   Lab Results   Component Value Date    A1C 5.4 04/28/2021    MYA 9.4 03/16/2021    MAG 2.3 04/28/2021    TSH 1.19 04/28/2021    CRP 1.8 04/28/2021    SED 33 (H) 05/22/2015       Anesthesia Plan    ASA Status:  3   NPO Status:  NPO Appropriate    Anesthesia Type: MAC.     - Reason for MAC: straight local not clinically adequate   Induction: Intravenous, Propofol.   Maintenance: TIVA.        Consents    Anesthesia Plan(s) and associated risks, benefits, and realistic alternatives discussed. Questions answered and patient/representative(s) expressed understanding.     - Discussed with:  Patient         Postoperative Care    Pain management: Multi-modal analgesia.   PONV prophylaxis: Background Propofol Infusion     Comments:                ERIN Singh CRNA

## 2021-07-23 ENCOUNTER — OFFICE VISIT (OUTPATIENT)
Dept: FAMILY MEDICINE | Facility: OTHER | Age: 71
End: 2021-07-23
Attending: FAMILY MEDICINE
Payer: COMMERCIAL

## 2021-07-23 VITALS
SYSTOLIC BLOOD PRESSURE: 136 MMHG | HEIGHT: 68 IN | DIASTOLIC BLOOD PRESSURE: 68 MMHG | WEIGHT: 201 LBS | OXYGEN SATURATION: 96 % | BODY MASS INDEX: 30.46 KG/M2 | HEART RATE: 58 BPM | TEMPERATURE: 97.5 F

## 2021-07-23 DIAGNOSIS — Z01.818 PREOP GENERAL PHYSICAL EXAM: Primary | ICD-10-CM

## 2021-07-23 LAB
ANION GAP SERPL CALCULATED.3IONS-SCNC: 7 MMOL/L (ref 3–14)
BASOPHILS # BLD AUTO: 0 10E3/UL (ref 0–0.2)
BASOPHILS NFR BLD AUTO: 0 %
BUN SERPL-MCNC: 17 MG/DL (ref 7–30)
CALCIUM SERPL-MCNC: 9.1 MG/DL (ref 8.5–10.1)
CHLORIDE BLD-SCNC: 107 MMOL/L (ref 94–109)
CO2 SERPL-SCNC: 25 MMOL/L (ref 20–32)
CREAT SERPL-MCNC: 1.02 MG/DL (ref 0.66–1.25)
EOSINOPHIL # BLD AUTO: 0.1 10E3/UL (ref 0–0.7)
EOSINOPHIL NFR BLD AUTO: 2 %
ERYTHROCYTE [DISTWIDTH] IN BLOOD BY AUTOMATED COUNT: 13.1 % (ref 10–15)
GFR SERPL CREATININE-BSD FRML MDRD: 73 ML/MIN/1.73M2
GLUCOSE BLD-MCNC: 94 MG/DL (ref 70–99)
HCT VFR BLD AUTO: 39.2 % (ref 40–53)
HGB BLD-MCNC: 13.3 G/DL (ref 13.3–17.7)
IMM GRANULOCYTES # BLD: 0 10E3/UL
IMM GRANULOCYTES NFR BLD: 0 %
LYMPHOCYTES # BLD AUTO: 1.2 10E3/UL (ref 0.8–5.3)
LYMPHOCYTES NFR BLD AUTO: 18 %
MCH RBC QN AUTO: 32.2 PG (ref 26.5–33)
MCHC RBC AUTO-ENTMCNC: 33.9 G/DL (ref 31.5–36.5)
MCV RBC AUTO: 95 FL (ref 78–100)
MONOCYTES # BLD AUTO: 0.6 10E3/UL (ref 0–1.3)
MONOCYTES NFR BLD AUTO: 9 %
NEUTROPHILS # BLD AUTO: 4.6 10E3/UL (ref 1.6–8.3)
NEUTROPHILS NFR BLD AUTO: 71 %
NRBC # BLD AUTO: 0 10E3/UL
NRBC BLD AUTO-RTO: 0 /100
PLATELET # BLD AUTO: 224 10E3/UL (ref 150–450)
POTASSIUM BLD-SCNC: 3.9 MMOL/L (ref 3.4–5.3)
RBC # BLD AUTO: 4.13 10E6/UL (ref 4.4–5.9)
SODIUM SERPL-SCNC: 139 MMOL/L (ref 133–144)
WBC # BLD AUTO: 6.5 10E3/UL (ref 4–11)

## 2021-07-23 PROCEDURE — 80048 BASIC METABOLIC PNL TOTAL CA: CPT | Mod: ZL | Performed by: FAMILY MEDICINE

## 2021-07-23 PROCEDURE — G0463 HOSPITAL OUTPT CLINIC VISIT: HCPCS

## 2021-07-23 PROCEDURE — 36415 COLL VENOUS BLD VENIPUNCTURE: CPT | Mod: ZL | Performed by: FAMILY MEDICINE

## 2021-07-23 PROCEDURE — 85025 COMPLETE CBC W/AUTO DIFF WBC: CPT | Mod: ZL | Performed by: FAMILY MEDICINE

## 2021-07-23 PROCEDURE — 99214 OFFICE O/P EST MOD 30 MIN: CPT | Performed by: FAMILY MEDICINE

## 2021-07-23 PROCEDURE — G0463 HOSPITAL OUTPT CLINIC VISIT: HCPCS | Mod: 25

## 2021-07-23 ASSESSMENT — ANXIETY QUESTIONNAIRES
5. BEING SO RESTLESS THAT IT IS HARD TO SIT STILL: NOT AT ALL
GAD7 TOTAL SCORE: 0
6. BECOMING EASILY ANNOYED OR IRRITABLE: NOT AT ALL
2. NOT BEING ABLE TO STOP OR CONTROL WORRYING: NOT AT ALL
7. FEELING AFRAID AS IF SOMETHING AWFUL MIGHT HAPPEN: NOT AT ALL
1. FEELING NERVOUS, ANXIOUS, OR ON EDGE: NOT AT ALL
3. WORRYING TOO MUCH ABOUT DIFFERENT THINGS: NOT AT ALL
4. TROUBLE RELAXING: NOT AT ALL

## 2021-07-23 ASSESSMENT — MIFFLIN-ST. JEOR: SCORE: 1636.23

## 2021-07-23 ASSESSMENT — PATIENT HEALTH QUESTIONNAIRE - PHQ9: SUM OF ALL RESPONSES TO PHQ QUESTIONS 1-9: 0

## 2021-07-23 ASSESSMENT — PAIN SCALES - GENERAL: PAINLEVEL: NO PAIN (0)

## 2021-07-23 NOTE — NURSING NOTE
"Chief Complaint   Patient presents with     Pre-Op Exam       Initial /68 (BP Location: Left arm, Patient Position: Sitting, Cuff Size: Adult Large)   Pulse 58   Temp 97.5  F (36.4  C) (Tympanic)   Ht 1.727 m (5' 8\")   Wt 91.2 kg (201 lb)   SpO2 96%   BMI 30.56 kg/m   Estimated body mass index is 30.56 kg/m  as calculated from the following:    Height as of this encounter: 1.727 m (5' 8\").    Weight as of this encounter: 91.2 kg (201 lb).  Medication Reconciliation: complete  Wanda Zambrano LPN  "

## 2021-07-24 ENCOUNTER — OFFICE VISIT (OUTPATIENT)
Dept: FAMILY MEDICINE | Facility: OTHER | Age: 71
End: 2021-07-24
Attending: SURGERY
Payer: COMMERCIAL

## 2021-07-24 DIAGNOSIS — Z01.818 PREOP TESTING: ICD-10-CM

## 2021-07-24 LAB — SARS-COV-2 RNA RESP QL NAA+PROBE: NEGATIVE

## 2021-07-24 PROCEDURE — U0005 INFEC AGEN DETEC AMPLI PROBE: HCPCS | Mod: ZL

## 2021-07-24 ASSESSMENT — ANXIETY QUESTIONNAIRES: GAD7 TOTAL SCORE: 0

## 2021-07-29 ENCOUNTER — HOSPITAL ENCOUNTER (OUTPATIENT)
Facility: HOSPITAL | Age: 71
Discharge: HOME | End: 2021-07-29
Attending: SURGERY | Admitting: SURGERY
Payer: COMMERCIAL

## 2021-07-29 ENCOUNTER — ANESTHESIA (OUTPATIENT)
Dept: SURGERY | Facility: HOSPITAL | Age: 71
End: 2021-07-29
Payer: COMMERCIAL

## 2021-07-29 VITALS
SYSTOLIC BLOOD PRESSURE: 135 MMHG | HEART RATE: 51 BPM | TEMPERATURE: 97.2 F | RESPIRATION RATE: 16 BRPM | DIASTOLIC BLOOD PRESSURE: 86 MMHG | OXYGEN SATURATION: 96 %

## 2021-07-29 DIAGNOSIS — Z12.11 SCREEN FOR COLON CANCER: ICD-10-CM

## 2021-07-29 PROCEDURE — 258N000003 HC RX IP 258 OP 636: Performed by: NURSE ANESTHETIST, CERTIFIED REGISTERED

## 2021-07-29 PROCEDURE — 250N000011 HC RX IP 250 OP 636: Performed by: NURSE ANESTHETIST, CERTIFIED REGISTERED

## 2021-07-29 PROCEDURE — 45378 DIAGNOSTIC COLONOSCOPY: CPT | Performed by: NURSE ANESTHETIST, CERTIFIED REGISTERED

## 2021-07-29 PROCEDURE — 999N000141 HC STATISTIC PRE-PROCEDURE NURSING ASSESSMENT: Performed by: SURGERY

## 2021-07-29 PROCEDURE — 99100 ANES PT EXTEME AGE<1 YR&>70: CPT | Performed by: NURSE ANESTHETIST, CERTIFIED REGISTERED

## 2021-07-29 PROCEDURE — 360N000075 HC SURGERY LEVEL 2, PER MIN: Performed by: SURGERY

## 2021-07-29 PROCEDURE — 710N000012 HC RECOVERY PHASE 2, PER MINUTE: Performed by: SURGERY

## 2021-07-29 PROCEDURE — 370N000017 HC ANESTHESIA TECHNICAL FEE, PER MIN: Performed by: SURGERY

## 2021-07-29 PROCEDURE — G0121 COLON CA SCRN NOT HI RSK IND: HCPCS | Performed by: SURGERY

## 2021-07-29 PROCEDURE — 250N000009 HC RX 250: Performed by: NURSE ANESTHETIST, CERTIFIED REGISTERED

## 2021-07-29 RX ORDER — PROPOFOL 10 MG/ML
INJECTION, EMULSION INTRAVENOUS PRN
Status: DISCONTINUED | OUTPATIENT
Start: 2021-07-29 | End: 2021-07-29

## 2021-07-29 RX ORDER — NALOXONE HYDROCHLORIDE 0.4 MG/ML
0.2 INJECTION, SOLUTION INTRAMUSCULAR; INTRAVENOUS; SUBCUTANEOUS
Status: DISCONTINUED | OUTPATIENT
Start: 2021-07-29 | End: 2021-07-29 | Stop reason: HOSPADM

## 2021-07-29 RX ORDER — NALOXONE HYDROCHLORIDE 0.4 MG/ML
0.4 INJECTION, SOLUTION INTRAMUSCULAR; INTRAVENOUS; SUBCUTANEOUS
Status: DISCONTINUED | OUTPATIENT
Start: 2021-07-29 | End: 2021-07-29 | Stop reason: HOSPADM

## 2021-07-29 RX ORDER — LIDOCAINE 40 MG/G
CREAM TOPICAL
Status: DISCONTINUED | OUTPATIENT
Start: 2021-07-29 | End: 2021-07-29 | Stop reason: HOSPADM

## 2021-07-29 RX ORDER — SODIUM CHLORIDE, SODIUM LACTATE, POTASSIUM CHLORIDE, CALCIUM CHLORIDE 600; 310; 30; 20 MG/100ML; MG/100ML; MG/100ML; MG/100ML
INJECTION, SOLUTION INTRAVENOUS CONTINUOUS
Status: DISCONTINUED | OUTPATIENT
Start: 2021-07-29 | End: 2021-07-29 | Stop reason: HOSPADM

## 2021-07-29 RX ORDER — ONDANSETRON 2 MG/ML
4 INJECTION INTRAMUSCULAR; INTRAVENOUS EVERY 30 MIN PRN
Status: DISCONTINUED | OUTPATIENT
Start: 2021-07-29 | End: 2021-07-29 | Stop reason: HOSPADM

## 2021-07-29 RX ORDER — MEPERIDINE HYDROCHLORIDE 25 MG/ML
12.5 INJECTION INTRAMUSCULAR; INTRAVENOUS; SUBCUTANEOUS
Status: DISCONTINUED | OUTPATIENT
Start: 2021-07-29 | End: 2021-07-29 | Stop reason: HOSPADM

## 2021-07-29 RX ORDER — ONDANSETRON 4 MG/1
4 TABLET, ORALLY DISINTEGRATING ORAL EVERY 30 MIN PRN
Status: DISCONTINUED | OUTPATIENT
Start: 2021-07-29 | End: 2021-07-29 | Stop reason: HOSPADM

## 2021-07-29 RX ORDER — FLUMAZENIL 0.1 MG/ML
0.2 INJECTION, SOLUTION INTRAVENOUS
Status: DISCONTINUED | OUTPATIENT
Start: 2021-07-29 | End: 2021-07-29 | Stop reason: HOSPADM

## 2021-07-29 RX ORDER — LIDOCAINE HYDROCHLORIDE 20 MG/ML
INJECTION, SOLUTION INFILTRATION; PERINEURAL PRN
Status: DISCONTINUED | OUTPATIENT
Start: 2021-07-29 | End: 2021-07-29

## 2021-07-29 RX ADMIN — PROPOFOL 30 MG: 10 INJECTION, EMULSION INTRAVENOUS at 09:27

## 2021-07-29 RX ADMIN — SODIUM CHLORIDE, POTASSIUM CHLORIDE, SODIUM LACTATE AND CALCIUM CHLORIDE: 600; 310; 30; 20 INJECTION, SOLUTION INTRAVENOUS at 09:12

## 2021-07-29 RX ADMIN — PROPOFOL 100 MG: 10 INJECTION, EMULSION INTRAVENOUS at 09:24

## 2021-07-29 RX ADMIN — PROPOFOL 30 MG: 10 INJECTION, EMULSION INTRAVENOUS at 09:30

## 2021-07-29 RX ADMIN — PROPOFOL 10 MG: 10 INJECTION, EMULSION INTRAVENOUS at 09:33

## 2021-07-29 RX ADMIN — LIDOCAINE HYDROCHLORIDE 40 MG: 20 INJECTION, SOLUTION INFILTRATION; PERINEURAL at 09:24

## 2021-07-29 NOTE — OR NURSING
Patient and responsible adult given discharge instructions with no questions regarding instructions. Derrell score 20. Pain level 0/10.  Discharged from unit via ambulation. Patient discharged to home accompanied by friend Raquel Canales.

## 2021-07-29 NOTE — OP NOTE
Oswald Goel MRN# [id number]   YOB: 1950 Age: 70 year old      Date of Admission:  7/29/2021  Date of Service:   7/29/21    Primary care provider: ALDEN Lowery    PREOPERATIVE DIAGNOSIS:  Colon Cancer Screening        POSTOPERATIVE DIAGNOSIS:  Normal colon          PROCEDURE:  Colonoscopy           INDICATIONS:  Screening colonoscopy.      Specimen: * No specimens in log *    SURGEON: Eh Villa MD    DESCRIPTION OF PROCEDURE: Oswald Goel was brought into the endoscopy suite and placed in the left lateral decubitus position. After preprocedural pause and attended monitored anesthesia was administered, the external anus was inspected and was normal. Digital rectal exam was normal. The colonoscope was inserted and advanced under direct visualization to the level of the cecum which was identified by the appendiceal orifice and the ileocecal valve. The prep was excellent.. Upon slow withdrawal of the colonoscope, approximately 95% of the mucosa was directly visualized. The colon was without mucosal abnormality. There was no evidence of further polyps, inflammation, bleeding or AVMs. Retroflexion of the rectum was normal. The extra air was removed from the colon, and the colonoscope withdrawn. The patient tolerated the procedure well and was taken to postanesthesia care unit.     We invite the patient to return in 10 years for follow up screening evaluation.    Eh Villa MD

## 2021-07-29 NOTE — OR NURSING
Arrived from OR with IV in left wrist intact, no redness or swelling; infusing LR at 100 ml per hour. Passing flatus on arrival.

## 2021-07-29 NOTE — ANESTHESIA CARE TRANSFER NOTE
Patient: Oswald Goel    Procedure(s):  Colonoscopy    Diagnosis: Screen for colon cancer [Z12.11]  Diagnosis Additional Information: No value filed.    Anesthesia Type:   MAC     Note:    Oropharynx: oropharynx clear of all foreign objects and spontaneously breathing  Level of Consciousness: awake and drowsy  Oxygen Supplementation: room air    Independent Airway: airway patency satisfactory and stable  Dentition: dentition unchanged  Vital Signs Stable: post-procedure vital signs reviewed and stable  Report to RN Given: handoff report given  Patient transferred to: Phase II    Handoff Report: Identifed the Patient, Identified the Reponsible Provider, Reviewed the pertinent medical history, Discussed the surgical course, Reviewed Intra-OP anesthesia mangement and issues during anesthesia, Set expectations for post-procedure period and Allowed opportunity for questions and acknowledgement of understanding      Vitals:  Vitals Value Taken Time   BP     Temp     Pulse     Resp     SpO2         Electronically Signed By: ERIN Gutierres CRNA  July 29, 2021  9:39 AM

## 2021-07-29 NOTE — DISCHARGE INSTRUCTIONS
You had a normal colonoscopy today.          INSTRUCTIONS AFTER COLONOSCOPY    WHEN YOU ARE BACK HOME:    Plan to rest for an hour or two after you get home.    You may have some cramping or pressure until you pass gas.    You may resume your regular medications.    Eat a small, light meal at first, and then gradually return to normal meal sizes.    You will be contacted the next day to see how you are doing.  If you had a polyp removed:    Slight bleeding may occur.  You may have a slight blood stain on the toilet paper after a bowel movement.    To lessen the chance of bleeding, avoid heavy exercise for ONE WEEK.  This includes heavy lifting, vigorous sport activities, and heavy physical labor.  You may resume your normal sexual activity.      Avoid aspirin or aspirin products if instructed by your doctor.    If there is a polyp or biopsy, you will be contacted with results within one week.     WHAT TO WATCH FOR:  Problems rarely occur after the exam; however, it is important for you to watch for early signs of possible problems.  If you have     Unusual pain in your abdomen    Nausea and vomiting that persists    Excessive bleeding    Black or bloody bowel movements    Fever or temperature above 100.6 F  Please call your doctor ([facility] [phone]) or go to the nearest hospital emergency room.          Post-Anesthesia Patient Instructions    IMMEDIATELY FOLLOWING SURGERY:  Do not drive or operate machinery for the first twenty four hours after surgery.  Do not make any important decisions for twenty four hours after surgery or while taking narcotic pain medications or sedatives.  If you develop intractable nausea and vomiting or a severe headache please notify your doctor immediately.    FOLLOW-UP:  Please make an appointment with your surgeon as instructed. You do not need to follow up with anesthesia unless specifically instructed to do so.    WOUND CARE INSTRUCTIONS (if applicable):  Keep a dry clean  dressing on the anesthesia/puncture wound site if there is drainage.  Once the wound has quit draining you may leave it open to air.  Generally you should leave the bandage intact for twenty four hours unless there is drainage.  If the epidural site drains for more than 36-48 hours please call the anesthesia department.    QUESTIONS?:  Please feel free to call your physician or the hospital  if you have any questions, and they will be happy to assist you.

## 2021-07-29 NOTE — ANESTHESIA POSTPROCEDURE EVALUATION
Patient: Oswald Goel    Procedure(s):  Colonoscopy    Diagnosis:Screen for colon cancer [Z12.11]  Diagnosis Additional Information: No value filed.    Anesthesia Type:  MAC    Note:  Disposition: Outpatient   Postop Pain Control: Uneventful            Sign Out: Well controlled pain   PONV: No   Neuro/Psych: Uneventful            Sign Out: Acceptable/Baseline neuro status   Airway/Respiratory: Uneventful            Sign Out: Acceptable/Baseline resp. status   CV/Hemodynamics: Uneventful            Sign Out: Acceptable CV status; No obvious hypovolemia; No obvious fluid overload   Other NRE: NONE   DID A NON-ROUTINE EVENT OCCUR? No           Last vitals:  Vitals Value Taken Time   /86 07/29/21 1025   Temp 97.2  F (36.2  C) 07/29/21 1020   Pulse 51 07/29/21 1025   Resp 16 07/29/21 1030   SpO2 96 % 07/29/21 1030       Electronically Signed By: ERIN Nielson CRNA  July 29, 2021  10:48 AM

## 2021-08-30 DIAGNOSIS — I10 ESSENTIAL HYPERTENSION, BENIGN: ICD-10-CM

## 2021-08-31 RX ORDER — LISINOPRIL 20 MG/1
TABLET ORAL
Qty: 90 TABLET | Refills: 1 | Status: SHIPPED | OUTPATIENT
Start: 2021-08-31 | End: 2021-11-26

## 2021-09-14 ENCOUNTER — OFFICE VISIT (OUTPATIENT)
Dept: FAMILY MEDICINE | Facility: OTHER | Age: 71
End: 2021-09-14
Attending: FAMILY MEDICINE
Payer: COMMERCIAL

## 2021-09-14 VITALS
RESPIRATION RATE: 18 BRPM | SYSTOLIC BLOOD PRESSURE: 106 MMHG | OXYGEN SATURATION: 95 % | DIASTOLIC BLOOD PRESSURE: 56 MMHG | HEART RATE: 65 BPM | WEIGHT: 201 LBS | HEIGHT: 68 IN | TEMPERATURE: 97 F | BODY MASS INDEX: 30.46 KG/M2

## 2021-09-14 DIAGNOSIS — L72.3 SEBACEOUS CYST: Primary | ICD-10-CM

## 2021-09-14 PROCEDURE — 99213 OFFICE O/P EST LOW 20 MIN: CPT | Performed by: FAMILY MEDICINE

## 2021-09-14 PROCEDURE — G0463 HOSPITAL OUTPT CLINIC VISIT: HCPCS | Mod: 25

## 2021-09-14 ASSESSMENT — PATIENT HEALTH QUESTIONNAIRE - PHQ9: SUM OF ALL RESPONSES TO PHQ QUESTIONS 1-9: 0

## 2021-09-14 ASSESSMENT — ANXIETY QUESTIONNAIRES
5. BEING SO RESTLESS THAT IT IS HARD TO SIT STILL: NOT AT ALL
7. FEELING AFRAID AS IF SOMETHING AWFUL MIGHT HAPPEN: NOT AT ALL
1. FEELING NERVOUS, ANXIOUS, OR ON EDGE: NOT AT ALL
3. WORRYING TOO MUCH ABOUT DIFFERENT THINGS: NOT AT ALL
6. BECOMING EASILY ANNOYED OR IRRITABLE: NOT AT ALL
4. TROUBLE RELAXING: NOT AT ALL
GAD7 TOTAL SCORE: 0
2. NOT BEING ABLE TO STOP OR CONTROL WORRYING: NOT AT ALL

## 2021-09-14 ASSESSMENT — MIFFLIN-ST. JEOR: SCORE: 1636.23

## 2021-09-14 ASSESSMENT — PAIN SCALES - GENERAL: PAINLEVEL: NO PAIN (0)

## 2021-09-14 NOTE — PROGRESS NOTES
"    Assessment & Plan     Sebaceous cyst    - Adult General Surg Referral; Future  Him see the surgeon to have the cyst excised.           BMI:   Estimated body mass index is 30.56 kg/m  as calculated from the following:    Height as of this encounter: 1.727 m (5' 8\").    Weight as of this encounter: 91.2 kg (201 lb).               ALDEN Lowery MD  [facility] - ABDIELJESSICA Akers is a 70 year old who presents for the following health issues     HPI     Concern - Mole  Onset: 1 month  Description: Black spot on back  Intensity: moderate  Progression of Symptoms:  same  Accompanying Signs & Symptoms: Mid back rough uneven edges, upper back lump with black dot in middle  Previous history of similar problem: N0  Precipitating factors:        Worsened by: NA  Alleviating factors:        Improved by: NA  Therapies tried and outcome:  none         Review of Systems   Constitutional, HEENT, cardiovascular, pulmonary, gi and gu systems are negative, except as otherwise noted.      Objective    /56 (BP Location: Right arm, Patient Position: Sitting, Cuff Size: Adult Large)   Pulse 65   Temp 97  F (36.1  C) (Tympanic)   Resp 18   Ht 1.727 m (5' 8\")   Wt 91.2 kg (201 lb)   SpO2 95%   BMI 30.56 kg/m    Body mass index is 30.56 kg/m .  Physical Exam   GENERAL: healthy, alert and no distress  EYES: Eyes grossly normal to inspection, PERRL and conjunctivae and sclerae normal  RESP: Breathing comfortably.  SKIN: Skin check on his back in the mid back region has a about a 2 cm diameter raised lesion with the central spot that looks like a noninflamed sebaceous cyst.  He has a small seborrheic keratosis on his back but no worrisome lesions                "

## 2021-09-14 NOTE — NURSING NOTE
"Chief Complaint   Patient presents with     Derm Problem       Initial /56 (BP Location: Right arm, Patient Position: Sitting, Cuff Size: Adult Large)   Pulse 65   Temp 97  F (36.1  C) (Tympanic)   Resp 18   Ht 1.727 m (5' 8\")   Wt 91.2 kg (201 lb)   SpO2 95%   BMI 30.56 kg/m   Estimated body mass index is 30.56 kg/m  as calculated from the following:    Height as of this encounter: 1.727 m (5' 8\").    Weight as of this encounter: 91.2 kg (201 lb).  Medication Reconciliation: complete  Wanda Zambrano LPN  "

## 2021-09-15 ASSESSMENT — ANXIETY QUESTIONNAIRES: GAD7 TOTAL SCORE: 0

## 2021-10-01 ENCOUNTER — OFFICE VISIT (OUTPATIENT)
Dept: SURGERY | Facility: OTHER | Age: 71
End: 2021-10-01
Attending: SURGERY
Payer: COMMERCIAL

## 2021-10-01 ENCOUNTER — IMMUNIZATION (OUTPATIENT)
Dept: FAMILY MEDICINE | Facility: OTHER | Age: 71
End: 2021-10-01
Attending: FAMILY MEDICINE
Payer: COMMERCIAL

## 2021-10-01 VITALS
BODY MASS INDEX: 30.31 KG/M2 | HEART RATE: 61 BPM | HEIGHT: 68 IN | TEMPERATURE: 97.4 F | DIASTOLIC BLOOD PRESSURE: 78 MMHG | OXYGEN SATURATION: 97 % | WEIGHT: 200 LBS | SYSTOLIC BLOOD PRESSURE: 138 MMHG

## 2021-10-01 DIAGNOSIS — L72.3 SEBACEOUS CYST: ICD-10-CM

## 2021-10-01 PROCEDURE — 91300 PR COVID VAC PFIZER DIL RECON 30 MCG/0.3 ML IM: CPT

## 2021-10-01 PROCEDURE — 11404 EXC TR-EXT B9+MARG 3.1-4 CM: CPT | Performed by: SURGERY

## 2021-10-01 PROCEDURE — 88304 TISSUE EXAM BY PATHOLOGIST: CPT | Mod: TC | Performed by: SURGERY

## 2021-10-01 PROCEDURE — 11404 EXC TR-EXT B9+MARG 3.1-4 CM: CPT

## 2021-10-01 PROCEDURE — G0463 HOSPITAL OUTPT CLINIC VISIT: HCPCS

## 2021-10-01 PROCEDURE — 12032 INTMD RPR S/A/T/EXT 2.6-7.5: CPT

## 2021-10-01 PROCEDURE — 12032 INTMD RPR S/A/T/EXT 2.6-7.5: CPT | Performed by: SURGERY

## 2021-10-01 ASSESSMENT — PAIN SCALES - GENERAL: PAINLEVEL: NO PAIN (0)

## 2021-10-01 ASSESSMENT — MIFFLIN-ST. JEOR: SCORE: 1631.69

## 2021-10-01 NOTE — PATIENT INSTRUCTIONS
Thank you for allowing Dr. Villa and the surgical team to participate in your care today. If you have any questions or concerns, you can call Dr. Villa's office at [phone] or [phone] between the hours of 8AM and 4:30PM Monday through Friday.     POST PROCEDURE INSTRUCTIONS      Apply ice to the surgical area to reduce swelling. (no longer than 20 minutes at a time)    Remove your dressing in 48 hours    Keep incision clean and dry   Do NOT soak in water such as a tub bath or swimming   Do NOT put make-up, deodorant, powders, hairspray, lotions, etc on the incision    Cover with a clean dressing daily or when wet/soiled    If you have steri-strips, these will fall off on their own in 7 days. If they are still adhered after 7 days, you may remove them by pulling gently.     You can use acetaminophen(Tylenol) or the prescription you received for pain.     If you have any bleeding, cover the wound with clean gauze and hold pressure for 10 Minutes. If the bleeding does not stop or is heavy and profuse, call the clinic or go to the Urgent Care/Emergency Department.      SIGNS OF INFECTION ARE:    Redness, swelling, red streaks, pus, drainage, warmth, fever, increased pain, foul smell.     Contact your surgeon or primary health care provider if you notice any of the warning signs.     FOLLOW - UP    As needed.  We will call you within a week with your pathology results.

## 2021-10-01 NOTE — NURSING NOTE
"Chief Complaint   Patient presents with     Consult For     sebaceous cyst, mid-back 2cm raised area. Referred by Dr. STEPHANY Lowery.        Initial /78 (BP Location: Left arm, Patient Position: Chair, Cuff Size: Adult Large)   Pulse 61   Ht 1.727 m (5' 8\")   Wt 90.7 kg (200 lb)   SpO2 97%   BMI 30.41 kg/m   Estimated body mass index is 30.41 kg/m  as calculated from the following:    Height as of this encounter: 1.727 m (5' 8\").    Weight as of this encounter: 90.7 kg (200 lb).  Medication Reconciliation: complete  TATUM ENGLISH LPN    "

## 2021-10-05 LAB
PATH REPORT.COMMENTS IMP SPEC: NORMAL
PATH REPORT.COMMENTS IMP SPEC: NORMAL
PATH REPORT.FINAL DX SPEC: NORMAL
PATH REPORT.GROSS SPEC: NORMAL
PATH REPORT.MICROSCOPIC SPEC OTHER STN: NORMAL
PATH REPORT.RELEVANT HX SPEC: NORMAL
PHOTO IMAGE: NORMAL

## 2021-10-05 PROCEDURE — 88304 TISSUE EXAM BY PATHOLOGIST: CPT | Mod: 26 | Performed by: PATHOLOGY

## 2021-10-08 NOTE — PROGRESS NOTES
[facility]  Minor Procedure Note    Preoperative diagnosis:  Symptomatic cyst     Postoperative diagnosis:  Same    Procedure:  Excisional biopsy 2cm right upper back     Anesthesia:  Local    History: 70 year old year old male with history of upper back cyst for several years, he has noted it getting bigger, his friends notice it when they pat him on the back. He is here by way of his PCP for outpatient excision.    Findings:  Sebaceous cyst     Tissue to pathology:    2.5 cm diameter sebaceous cyst removed.     Details:   The requisite time out pause observed during which the patient confirmed their identity, date of birth, side and site of the requested excision.  The region was prepped and draped sterilely; local anesthesia was obtained with infiltration of 4 cc of 2% lidocaine, with epi.  A transverse incision was made over the presenting portion of the mass and carried through full thickness skin.  Using combination of blunt and sharp dissection the mass was delivered through the incision.  The wound was irrigated and closed in layers with interrupted 3-0 Vicryl in the subcutaneous tissue.  The skin was reapproximated with 4-0 monocryl in a subcuticular fashion. Derma-bond placed over wound.   The procedure was well tolerated and the patient was discharged with wound care instructions and followup appointment.       Eh Villa MD

## 2021-11-26 DIAGNOSIS — I10 ESSENTIAL HYPERTENSION, BENIGN: ICD-10-CM

## 2021-11-26 RX ORDER — LISINOPRIL 20 MG/1
TABLET ORAL
Qty: 90 TABLET | Refills: 0 | Status: SHIPPED | OUTPATIENT
Start: 2021-11-26 | End: 2022-02-24

## 2021-11-26 NOTE — TELEPHONE ENCOUNTER
Lisinopril  Last Written Prescription Date: 8/31//21  Last Fill Quantity: 90 # of Refills: 1  Last Office Visit: 9/14/21

## 2021-12-02 DIAGNOSIS — I10 ESSENTIAL HYPERTENSION, BENIGN: ICD-10-CM

## 2021-12-02 RX ORDER — AMLODIPINE BESYLATE 2.5 MG/1
TABLET ORAL
Qty: 90 TABLET | Refills: 0 | Status: SHIPPED | OUTPATIENT
Start: 2021-12-02 | End: 2022-03-07

## 2021-12-02 RX ORDER — AMLODIPINE BESYLATE 5 MG/1
TABLET ORAL
Qty: 90 TABLET | Refills: 0 | Status: SHIPPED | OUTPATIENT
Start: 2021-12-02 | End: 2022-02-24

## 2021-12-02 NOTE — TELEPHONE ENCOUNTER
Norvasc 5 mg  Last Written Prescription Date: 11/19/20  Last Fill Quantity: 90 # of Refills: 3  Last Office Visit: 9/14/21    Norvasc 2.5 mg  Last Written Prescription Date: 11/19/20  Last Fill Quantity: 90 # of Refills: 3  Last Office Visit: 9/14/21

## 2022-02-09 ENCOUNTER — TELEPHONE (OUTPATIENT)
Dept: FAMILY MEDICINE | Facility: OTHER | Age: 72
End: 2022-02-09
Payer: COMMERCIAL

## 2022-02-09 NOTE — TELEPHONE ENCOUNTER
3:13 PM    Reason for Call: OVERBOOK    Patient needs to get rescheduled from 3/14/2022 for an establish care.       Was an appointment offered for this call? No  If yes : Appointment type              Date    Preferred method for responding to this message: Telephone Call  What is your phone number ? [phone]    If we cannot reach you directly, may we leave a detailed response at the number you provided? Yes    Can this message wait until your PCP/provider returns, if unavailable today? Not applicable, provider is in today.    Elsi Carrera

## 2022-02-24 DIAGNOSIS — I10 ESSENTIAL HYPERTENSION, BENIGN: ICD-10-CM

## 2022-02-24 RX ORDER — LISINOPRIL 20 MG/1
TABLET ORAL
Qty: 90 TABLET | Refills: 0 | Status: SHIPPED | OUTPATIENT
Start: 2022-02-24 | End: 2022-03-23

## 2022-02-24 RX ORDER — AMLODIPINE BESYLATE 5 MG/1
TABLET ORAL
Qty: 90 TABLET | Refills: 0 | Status: SHIPPED | OUTPATIENT
Start: 2022-02-24 | End: 2022-03-23

## 2022-03-07 DIAGNOSIS — I10 ESSENTIAL HYPERTENSION, BENIGN: ICD-10-CM

## 2022-03-07 RX ORDER — AMLODIPINE BESYLATE 2.5 MG/1
TABLET ORAL
Qty: 90 TABLET | Refills: 0 | Status: SHIPPED | OUTPATIENT
Start: 2022-03-07 | End: 2022-03-23 | Stop reason: DRUGHIGH

## 2022-03-23 ENCOUNTER — OFFICE VISIT (OUTPATIENT)
Dept: FAMILY MEDICINE | Facility: OTHER | Age: 72
End: 2022-03-23
Attending: FAMILY MEDICINE
Payer: COMMERCIAL

## 2022-03-23 VITALS
SYSTOLIC BLOOD PRESSURE: 138 MMHG | DIASTOLIC BLOOD PRESSURE: 68 MMHG | HEART RATE: 71 BPM | TEMPERATURE: 97.9 F | OXYGEN SATURATION: 98 % | BODY MASS INDEX: 30.41 KG/M2 | HEIGHT: 68 IN

## 2022-03-23 DIAGNOSIS — I10 ESSENTIAL HYPERTENSION, BENIGN: ICD-10-CM

## 2022-03-23 DIAGNOSIS — Z00.00 ENCOUNTER FOR MEDICAL EXAMINATION TO ESTABLISH CARE: Primary | ICD-10-CM

## 2022-03-23 DIAGNOSIS — E61.1 IRON DEFICIENCY: ICD-10-CM

## 2022-03-23 DIAGNOSIS — E78.00 PURE HYPERCHOLESTEROLEMIA: ICD-10-CM

## 2022-03-23 DIAGNOSIS — E55.9 HYPOVITAMINOSIS D: ICD-10-CM

## 2022-03-23 DIAGNOSIS — G47.09 OTHER INSOMNIA: ICD-10-CM

## 2022-03-23 DIAGNOSIS — Z29.8 * * * SBE PROPHYLAXIS * * *: ICD-10-CM

## 2022-03-23 PROCEDURE — G0463 HOSPITAL OUTPT CLINIC VISIT: HCPCS

## 2022-03-23 PROCEDURE — 99214 OFFICE O/P EST MOD 30 MIN: CPT | Performed by: FAMILY MEDICINE

## 2022-03-23 RX ORDER — TRAZODONE HYDROCHLORIDE 50 MG/1
25-50 TABLET, FILM COATED ORAL AT BEDTIME
Qty: 30 TABLET | Refills: 1 | Status: SHIPPED | OUTPATIENT
Start: 2022-03-23 | End: 2022-06-10

## 2022-03-23 RX ORDER — SIMVASTATIN 40 MG
40 TABLET ORAL AT BEDTIME
Qty: 90 TABLET | Refills: 1 | Status: SHIPPED | OUTPATIENT
Start: 2022-03-23 | End: 2022-06-10

## 2022-03-23 RX ORDER — LISINOPRIL 20 MG/1
20 TABLET ORAL DAILY
Qty: 90 TABLET | Refills: 2 | Status: SHIPPED | OUTPATIENT
Start: 2022-03-23 | End: 2023-03-06

## 2022-03-23 RX ORDER — AMOXICILLIN 500 MG/1
CAPSULE ORAL
Qty: 4 CAPSULE | Refills: 0 | Status: SHIPPED | OUTPATIENT
Start: 2022-03-23 | End: 2022-04-05

## 2022-03-23 RX ORDER — AMLODIPINE BESYLATE 5 MG/1
7.5 TABLET ORAL DAILY
Qty: 135 TABLET | Refills: 2 | Status: SHIPPED | OUTPATIENT
Start: 2022-03-23 | End: 2022-04-20

## 2022-03-23 SDOH — HEALTH STABILITY: PHYSICAL HEALTH: ON AVERAGE, HOW MANY DAYS PER WEEK DO YOU ENGAGE IN MODERATE TO STRENUOUS EXERCISE (LIKE A BRISK WALK)?: 5 DAYS

## 2022-03-23 SDOH — HEALTH STABILITY: PHYSICAL HEALTH: ON AVERAGE, HOW MANY MINUTES DO YOU ENGAGE IN EXERCISE AT THIS LEVEL?: 50 MIN

## 2022-03-23 ASSESSMENT — ANXIETY QUESTIONNAIRES
IF YOU CHECKED OFF ANY PROBLEMS ON THIS QUESTIONNAIRE, HOW DIFFICULT HAVE THESE PROBLEMS MADE IT FOR YOU TO DO YOUR WORK, TAKE CARE OF THINGS AT HOME, OR GET ALONG WITH OTHER PEOPLE: NOT DIFFICULT AT ALL
6. BECOMING EASILY ANNOYED OR IRRITABLE: SEVERAL DAYS
3. WORRYING TOO MUCH ABOUT DIFFERENT THINGS: NOT AT ALL
7. FEELING AFRAID AS IF SOMETHING AWFUL MIGHT HAPPEN: NOT AT ALL
2. NOT BEING ABLE TO STOP OR CONTROL WORRYING: NOT AT ALL
4. TROUBLE RELAXING: NOT AT ALL
GAD7 TOTAL SCORE: 2
1. FEELING NERVOUS, ANXIOUS, OR ON EDGE: SEVERAL DAYS
5. BEING SO RESTLESS THAT IT IS HARD TO SIT STILL: NOT AT ALL

## 2022-03-23 ASSESSMENT — SOCIAL DETERMINANTS OF HEALTH (SDOH)
WITHIN THE LAST YEAR, HAVE YOU BEEN HUMILIATED OR EMOTIONALLY ABUSED IN OTHER WAYS BY YOUR PARTNER OR EX-PARTNER?: NO
WITHIN THE LAST YEAR, HAVE YOU BEEN AFRAID OF YOUR PARTNER OR EX-PARTNER?: NO
WITHIN THE LAST YEAR, HAVE TO BEEN RAPED OR FORCED TO HAVE ANY KIND OF SEXUAL ACTIVITY BY YOUR PARTNER OR EX-PARTNER?: NO
WITHIN THE LAST YEAR, HAVE YOU BEEN KICKED, HIT, SLAPPED, OR OTHERWISE PHYSICALLY HURT BY YOUR PARTNER OR EX-PARTNER?: NO

## 2022-03-23 ASSESSMENT — LIFESTYLE VARIABLES
HOW OFTEN DO YOU HAVE SIX OR MORE DRINKS ON ONE OCCASION: NEVER
HOW OFTEN DO YOU HAVE A DRINK CONTAINING ALCOHOL: 4 OR MORE TIMES A WEEK
HOW MANY STANDARD DRINKS CONTAINING ALCOHOL DO YOU HAVE ON A TYPICAL DAY: 1 OR 2

## 2022-03-23 ASSESSMENT — PAIN SCALES - GENERAL: PAINLEVEL: NO PAIN (0)

## 2022-03-23 ASSESSMENT — PATIENT HEALTH QUESTIONNAIRE - PHQ9: SUM OF ALL RESPONSES TO PHQ QUESTIONS 1-9: 3

## 2022-03-23 NOTE — PROGRESS NOTES
"  Assessment & Plan     Encounter for medical examination to establish care  Records here    * * * SBE PROPHYLAXIS * * *  Going to dentist soon, needs rx  - amoxicillin (AMOXIL) 500 MG capsule; Take 4 capsules 30-60 minutes before procedure    Other insomnia  Tried remeron previously but didn't think it was helpful and was an 'antidepressant'  Start trazodone and follow-up 6-7 wks  - traZODone (DESYREL) 50 MG tablet; Take 0.5-1 tablets (25-50 mg) by mouth At Bedtime    Essential hypertension, benign  Was taking 2 tablets, would like to try 1.5 of one tablet  - amLODIPine (NORVASC) 5 MG tablet; Take 1.5 tablets (7.5 mg) by mouth daily  - lisinopril (ZESTRIL) 20 MG tablet; Take 1 tablet (20 mg) by mouth daily    Pure hypercholesterolemia  Needs fasting lipids next week  - simvastatin (ZOCOR) 40 MG tablet; Take 1 tablet (40 mg) by mouth At Bedtime  - Comprehensive metabolic panel; Future  - Lipid Profile (Chol, Trig, HDL, LDL calc); Future    Hypovitaminosis D  Taking 1000 daily  - Vitamin D Deficiency; Future    Iron deficiency  Was taking supplements at home, need to check levels  Hold meds until labs done  - Iron and iron binding capacity; Future  - Ferritin; Future    30 minutes spent on the date of the encounter doing chart review, history and exam, documentation and further activities per the note       BMI:   Estimated body mass index is 30.41 kg/m  as calculated from the following:    Height as of this encounter: 1.727 m (5' 8\").    Weight as of 10/1/21: 90.7 kg (200 lb).   Weight management plan: Discussed healthy diet and exercise guidelines    Patient was agreeable to this plan and had no further questions.  There are no Patient Instructions on file for this visit.    No follow-ups on file.    Meche Dougherty MD  [facility] - VINNY Akers is a 71 year old who presents for the following health issues   HPI     Hyperlipidemia Follow-Up      Are you regularly taking any " "medication or supplement to lower your cholesterol?   Yes- Simvastatin     Are you having muscle aches or other side effects that you think could be caused by your cholesterol lowering medication?  No    Hypertension Follow-up      Do you check your blood pressure regularly outside of the clinic? No     Are you following a low salt diet? Yes    Are your blood pressures ever more than 140 on the top number (systolic) OR more   than 90 on the bottom number (diastolic), for example 140/90? No    Vascular Disease Follow-up      How often do you take nitroglycerin? Never, does not have a prescription    Do you take an aspirin every day? No    NEEDS SCRIPT FOR AMOXICILLIN FOR DENTAL PROCEDURE-NEVER GOT PRESCRIPTION.     Review of Systems   Constitutional, HEENT, cardiovascular, pulmonary, gi and gu systems are negative, except as otherwise noted.      Objective    /68 (BP Location: Left arm, Patient Position: Sitting, Cuff Size: Adult Large)   Pulse 71   Temp 97.9  F (36.6  C) (Tympanic)   Ht 1.727 m (5' 8\")   SpO2 98%   BMI 30.41 kg/m    Body mass index is 30.41 kg/m .  Physical Exam   GENERAL: healthy, alert and no distress  NECK: no adenopathy, no asymmetry, masses, or scars and thyroid normal to palpation  RESP: lungs clear to auscultation - no rales, rhonchi or wheezes  CV: regular rate and rhythm, normal S1 S2, no S3 or S4, no murmur, click or rub, no peripheral edema and peripheral pulses strong  ABDOMEN: soft, nontender, no hepatosplenomegaly, no masses and bowel sounds normal  MS: no gross musculoskeletal defects noted, no edema  PSYCH: mentation appears normal, affect normal/bright    No results found for any visits on 03/23/22.          "

## 2022-03-23 NOTE — NURSING NOTE
"Chief Complaint   Patient presents with     Establish Care       Initial BP (!) 164/68 (BP Location: Left arm, Patient Position: Sitting, Cuff Size: Adult Large)   Pulse 71   Temp 97.9  F (36.6  C) (Tympanic)   Ht 1.727 m (5' 8\")   SpO2 98%   BMI 30.41 kg/m   Estimated body mass index is 30.41 kg/m  as calculated from the following:    Height as of this encounter: 1.727 m (5' 8\").    Weight as of 10/1/21: 90.7 kg (200 lb).  Medication Reconciliation: complete  Janneth Enciso LPN  "

## 2022-03-24 ASSESSMENT — ANXIETY QUESTIONNAIRES: GAD7 TOTAL SCORE: 2

## 2022-03-30 ENCOUNTER — LAB (OUTPATIENT)
Dept: LAB | Facility: OTHER | Age: 72
End: 2022-03-30
Payer: COMMERCIAL

## 2022-03-30 DIAGNOSIS — E78.00 PURE HYPERCHOLESTEROLEMIA: ICD-10-CM

## 2022-03-30 DIAGNOSIS — E61.1 IRON DEFICIENCY: ICD-10-CM

## 2022-03-30 DIAGNOSIS — E55.9 HYPOVITAMINOSIS D: ICD-10-CM

## 2022-03-30 LAB
ALBUMIN SERPL-MCNC: 3.8 G/DL (ref 3.4–5)
ALP SERPL-CCNC: 114 U/L (ref 40–150)
ALT SERPL W P-5'-P-CCNC: 40 U/L (ref 0–70)
ANION GAP SERPL CALCULATED.3IONS-SCNC: 2 MMOL/L (ref 3–14)
AST SERPL W P-5'-P-CCNC: 30 U/L (ref 0–45)
BILIRUB SERPL-MCNC: 0.6 MG/DL (ref 0.2–1.3)
BUN SERPL-MCNC: 19 MG/DL (ref 7–30)
CALCIUM SERPL-MCNC: 9 MG/DL (ref 8.5–10.1)
CHLORIDE BLD-SCNC: 107 MMOL/L (ref 94–109)
CHOLEST SERPL-MCNC: 175 MG/DL
CO2 SERPL-SCNC: 29 MMOL/L (ref 20–32)
CREAT SERPL-MCNC: 1.07 MG/DL (ref 0.66–1.25)
FASTING STATUS PATIENT QL REPORTED: YES
FERRITIN SERPL-MCNC: 185 NG/ML (ref 26–388)
GFR SERPL CREATININE-BSD FRML MDRD: 73 ML/MIN/1.73M2
GLUCOSE BLD-MCNC: 103 MG/DL (ref 70–99)
HDLC SERPL-MCNC: 53 MG/DL
IRON SATN MFR SERPL: 16 % (ref 15–46)
IRON SERPL-MCNC: 53 UG/DL (ref 35–180)
LDLC SERPL CALC-MCNC: 104 MG/DL
NONHDLC SERPL-MCNC: 122 MG/DL
POTASSIUM BLD-SCNC: 3.9 MMOL/L (ref 3.4–5.3)
PROT SERPL-MCNC: 7.9 G/DL (ref 6.8–8.8)
SODIUM SERPL-SCNC: 138 MMOL/L (ref 133–144)
TIBC SERPL-MCNC: 340 UG/DL (ref 240–430)
TRIGL SERPL-MCNC: 90 MG/DL

## 2022-03-30 PROCEDURE — 36415 COLL VENOUS BLD VENIPUNCTURE: CPT | Mod: ZL

## 2022-03-30 PROCEDURE — 80053 COMPREHEN METABOLIC PANEL: CPT | Mod: ZL

## 2022-03-30 PROCEDURE — 83550 IRON BINDING TEST: CPT | Mod: ZL

## 2022-03-30 PROCEDURE — 82306 VITAMIN D 25 HYDROXY: CPT | Mod: ZL

## 2022-03-30 PROCEDURE — 80061 LIPID PANEL: CPT | Mod: ZL

## 2022-03-30 PROCEDURE — 82728 ASSAY OF FERRITIN: CPT | Mod: ZL

## 2022-03-31 LAB — DEPRECATED CALCIDIOL+CALCIFEROL SERPL-MC: 32 UG/L (ref 20–75)

## 2022-04-05 ENCOUNTER — HOSPITAL ENCOUNTER (EMERGENCY)
Facility: HOSPITAL | Age: 72
Discharge: HOME | End: 2022-04-05
Attending: NURSE PRACTITIONER | Admitting: NURSE PRACTITIONER
Payer: COMMERCIAL

## 2022-04-05 ENCOUNTER — NURSE TRIAGE (OUTPATIENT)
Dept: FAMILY MEDICINE | Facility: OTHER | Age: 72
End: 2022-04-05
Payer: COMMERCIAL

## 2022-04-05 VITALS
SYSTOLIC BLOOD PRESSURE: 164 MMHG | DIASTOLIC BLOOD PRESSURE: 84 MMHG | HEART RATE: 63 BPM | OXYGEN SATURATION: 99 % | RESPIRATION RATE: 16 BRPM | TEMPERATURE: 97.9 F

## 2022-04-05 DIAGNOSIS — R20.2 PARESTHESIA: ICD-10-CM

## 2022-04-05 LAB
ALBUMIN SERPL-MCNC: 4.3 G/DL (ref 3.4–5)
ALP SERPL-CCNC: 95 U/L (ref 40–150)
ALT SERPL W P-5'-P-CCNC: 50 U/L (ref 0–70)
ANION GAP SERPL CALCULATED.3IONS-SCNC: 6 MMOL/L (ref 3–14)
AST SERPL W P-5'-P-CCNC: 32 U/L (ref 0–45)
BASOPHILS # BLD AUTO: 0 10E3/UL (ref 0–0.2)
BASOPHILS NFR BLD AUTO: 0 %
BILIRUB SERPL-MCNC: 1 MG/DL (ref 0.2–1.3)
BUN SERPL-MCNC: 18 MG/DL (ref 7–30)
CALCIUM SERPL-MCNC: 9.5 MG/DL (ref 8.5–10.1)
CHLORIDE BLD-SCNC: 104 MMOL/L (ref 94–109)
CK SERPL-CCNC: 314 U/L (ref 30–300)
CO2 SERPL-SCNC: 25 MMOL/L (ref 20–32)
CREAT SERPL-MCNC: 0.94 MG/DL (ref 0.66–1.25)
EOSINOPHIL # BLD AUTO: 0 10E3/UL (ref 0–0.7)
EOSINOPHIL NFR BLD AUTO: 0 %
ERYTHROCYTE [DISTWIDTH] IN BLOOD BY AUTOMATED COUNT: 12.8 % (ref 10–15)
GFR SERPL CREATININE-BSD FRML MDRD: 86 ML/MIN/1.73M2
GLUCOSE BLD-MCNC: 108 MG/DL (ref 70–99)
HCT VFR BLD AUTO: 41.4 % (ref 40–53)
HGB BLD-MCNC: 14.1 G/DL (ref 13.3–17.7)
HOLD SPECIMEN: NORMAL
IMM GRANULOCYTES # BLD: 0 10E3/UL
IMM GRANULOCYTES NFR BLD: 0 %
LYMPHOCYTES # BLD AUTO: 1.1 10E3/UL (ref 0.8–5.3)
LYMPHOCYTES NFR BLD AUTO: 14 %
MCH RBC QN AUTO: 32.3 PG (ref 26.5–33)
MCHC RBC AUTO-ENTMCNC: 34.1 G/DL (ref 31.5–36.5)
MCV RBC AUTO: 95 FL (ref 78–100)
MONOCYTES # BLD AUTO: 0.5 10E3/UL (ref 0–1.3)
MONOCYTES NFR BLD AUTO: 6 %
NEUTROPHILS # BLD AUTO: 6.3 10E3/UL (ref 1.6–8.3)
NEUTROPHILS NFR BLD AUTO: 80 %
NRBC # BLD AUTO: 0 10E3/UL
NRBC BLD AUTO-RTO: 0 /100
PLATELET # BLD AUTO: 249 10E3/UL (ref 150–450)
POTASSIUM BLD-SCNC: 3.7 MMOL/L (ref 3.4–5.3)
PROT SERPL-MCNC: 8.5 G/DL (ref 6.8–8.8)
RBC # BLD AUTO: 4.37 10E6/UL (ref 4.4–5.9)
SODIUM SERPL-SCNC: 135 MMOL/L (ref 133–144)
WBC # BLD AUTO: 8 10E3/UL (ref 4–11)

## 2022-04-05 PROCEDURE — 80053 COMPREHEN METABOLIC PANEL: CPT | Performed by: NURSE PRACTITIONER

## 2022-04-05 PROCEDURE — 85025 COMPLETE CBC W/AUTO DIFF WBC: CPT | Performed by: NURSE PRACTITIONER

## 2022-04-05 PROCEDURE — G0463 HOSPITAL OUTPT CLINIC VISIT: HCPCS

## 2022-04-05 PROCEDURE — 36415 COLL VENOUS BLD VENIPUNCTURE: CPT | Performed by: NURSE PRACTITIONER

## 2022-04-05 PROCEDURE — 82607 VITAMIN B-12: CPT | Performed by: NURSE PRACTITIONER

## 2022-04-05 PROCEDURE — 82550 ASSAY OF CK (CPK): CPT | Performed by: NURSE PRACTITIONER

## 2022-04-05 PROCEDURE — 99213 OFFICE O/P EST LOW 20 MIN: CPT | Performed by: NURSE PRACTITIONER

## 2022-04-05 ASSESSMENT — ENCOUNTER SYMPTOMS
EYE REDNESS: 0
RHINORRHEA: 0
WOUND: 0
SORE THROAT: 0
VOMITING: 0
EYE PAIN: 0
POLYPHAGIA: 0
CHILLS: 0
NUMBNESS: 1
COUGH: 0
SHORTNESS OF BREATH: 1
POLYDIPSIA: 0
FATIGUE: 0
FEVER: 0
SINUS PAIN: 0
HEADACHES: 0
DYSURIA: 0
ABDOMINAL PAIN: 0
NAUSEA: 0
DIZZINESS: 0
ACTIVITY CHANGE: 1
DIARRHEA: 0
LIGHT-HEADEDNESS: 0
APPETITE CHANGE: 0
CHEST TIGHTNESS: 0

## 2022-04-05 NOTE — ED TRIAGE NOTES
Pt presents with c/o tingling all over body intermittently. Mostly his hands, arms, and feet bilaterally. Started 3 weeks ago and worsened within the last week. Pt has not been seen by primary for this. Pt saw new primary but did not mention this to his doctor. Pt has been taking tylenol.

## 2022-04-05 NOTE — DISCHARGE INSTRUCTIONS
Stop your Zocor (simvastatin) 40 mg until you see Dr. Dougherty     Push fluids    Avoid caffeine and alcohol     Limit strenuous activity

## 2022-04-05 NOTE — ED PROVIDER NOTES
History     Chief Complaint   Patient presents with     Numbness     tingling     HPI  Oswald Goel is a 71 year old male who presents to the urgent care with a 2-3 week history of bilateral intermittent tingling to hands, feet, face, and arms x2-3 weeks. He stopped drinking coffee and reduced alcohol intake from 3 drinks per day to one drink per day without relief. He is a non smoker and is not a diabetic. He has not had any new immunizations since shingles vaccine in November of 2021. He recently started taking trazodone for sleep but was having the tingling before starting this medication. He works out for 65 minutes on the [employer] machine most days per week, and the numbness exacerbated by exercise today. He denies headache, dizziness, light-headedness, n/v/d, cough, fever, rashes, congestion, chest pain, and abdominal pain. He also denies recent trauma. He has taken acetaminophen without relief.     Allergies:  Allergies   Allergen Reactions     Animal Dander      Deer hair       Problem List:    Patient Active Problem List    Diagnosis Date Noted     * * * SBE PROPHYLAXIS * * * 03/23/2022     Priority: Medium     Other insomnia 03/23/2022     Priority: Medium     Pure hypercholesterolemia 03/23/2022     Priority: Medium     Hypovitaminosis D 03/23/2022     Priority: Medium     Iron deficiency 03/23/2022     Priority: Medium     Screen for colon cancer 06/29/2021     Priority: Medium     Added automatically from request for surgery [id number]       Tingling 04/28/2021     Priority: Medium     History of tobacco abuse 04/28/2021     Priority: Medium     TENORIO (dyspnea on exertion) 04/28/2021     Priority: Medium     History of [facility] to [facility]  04/28/[facility]     Priority: Medium     Coronary artery disease involving native coronary artery of native heart without angina pectoris 04/28/2021     Priority: Medium     History of COVID-19 on 11/20/2020 04/28/2021     Priority:  Medium     Regular alcohol consumption 04/28/2021     Priority: Medium     Hypertriglyceridemia 04/28/2021     Priority: Medium     Colon cancer screening 03/25/2021     Priority: Medium     Added automatically from request for surgery [id number]       ACP (advance care planning) 08/10/2016     Priority: Medium     Advance Care Planning 8/10/2016: ACP Review of Chart / Resources Provided:  Reviewed chart for advance care plan.  Oswald ORNELAS Amando has no plan or code status on file. Discussed available resources and provided with information. Confirmed code status reflects current choices pending further ACP discussions.  Confirmed/documented legally designated decision makers.  Added by YOANDY NASH             Primary localized osteoarthrosis, pelvic region and thigh 01/20/2015     Priority: Medium     Advanced care planning/counseling discussion 03/30/2012     Priority: Medium     Dysthymic disorder 02/22/2011     Priority: Medium     Essential hypertension, benign 02/22/2011     Priority: Medium     RBBB 02/22/2011     Priority: Medium     Nonallopathic lesion of cervical region 08/27/2008     Priority: Medium     Problem list name updated by automated process. Provider to review       Hypertrophy of prostate without urinary obstruction 10/10/2007     Priority: Medium     Problem list name updated by automated process. Provider to review       Mixed hyperlipidemia 12/29/1999     Priority: Medium        Past Medical History:    Past Medical History:   Diagnosis Date     Depressive disorder, not elsewhere classified 02/22/2011     Hypertension, Benign 02/22/2011     Hypertrophy (benign) of prostate without urinary o 10/10/2007     Nonallopathic lesion of cervical region, not elsewhere classified 08/27/2008     Pure hypercholesterolemia 12/29/1999     RBBB 02/22/2011       Past Surgical History:    Past Surgical History:   Procedure Laterality Date     COLONOSCOPY  03/01/2011    repeat 10 yrs     COLONOSCOPY  01/01/2005      COLONOSCOPY N/A 07/29/2021    Procedure: Colonoscopy;  Surgeon: Eh Villa MD;  Location: [facility]     HERNIA REPAIR N/A 1998    unknown     left arthroscopy Left 1968    knee -- Dr Love     RELEASE CARPAL TUNNEL Bilateral     Dr Gonsalez     right shoulder replacement Right     Dr Trinh     right total hip replacement N/A     Dr Hurtado     TONSILLECTOMY       TRANSPOSITION ULNAR NERVE (ELBOW) Left 11/19/2015    Procedure: TRANSPOSITION ULNAR NERVE (ELBOW);  Surgeon: Mahesh Capps MD;  Location: [facility]       Family History:    Family History   Problem Relation Age of Onset     Heart Disease Mother         CABG     Pancreatic Cancer Mother 81     Other - See Comments Father 84     Family History Negative Sister      Family History Negative Sister      Unknown/Adopted Maternal Grandmother      Unknown/Adopted Maternal Grandfather      Unknown/Adopted Paternal Grandmother      Unknown/Adopted Paternal Grandfather        Social History:  Marital Status:   [4]  Social History     Tobacco Use     Smoking status: Former Smoker     Packs/day: 0.10     Years: 2.00     Pack years: 0.20     Types: Cigarettes     Smokeless tobacco: Former User     Tobacco comment: no passive exposure, tried to quit-yes   Vaping Use     Vaping Use: Never used   Substance Use Topics     Alcohol use: Yes     Comment: daily      Drug use: No        Medications:    acetaminophen (TYLENOL) 500 MG tablet  amLODIPine (NORVASC) 5 MG tablet  Ferrous Gluconate 240 (27 Fe) MG TABS  fish oil-omega-3 fatty acids 1000 MG capsule  lisinopril (ZESTRIL) 20 MG tablet  Milk Thistle-Dand-Fennel-Licor (MILK THISTLE XTRA) CAPS capsule  Multiple Vitamin (DAILY MULTIVITAMIN PO)  simvastatin (ZOCOR) 40 MG tablet  traZODone (DESYREL) 50 MG tablet  Turmeric 500 MG TABS  vitamin C (ASCORBIC ACID) 1000 MG TABS          Review of Systems   Constitutional: Positive for activity change. Negative for appetite change, chills, fatigue and fever.   HENT:  Negative for congestion, ear pain, rhinorrhea, sinus pain and sore throat.    Eyes: Negative for pain and redness.   Respiratory: Positive for shortness of breath (exacerbated with activity ). Negative for cough and chest tightness.    Cardiovascular: Negative for chest pain.   Gastrointestinal: Negative for abdominal pain, diarrhea, nausea and vomiting.   Endocrine: Negative for polydipsia and polyphagia.   Genitourinary: Negative for dysuria.   Skin: Negative for rash and wound.   Neurological: Positive for numbness. Negative for dizziness, light-headedness and headaches.   All other systems reviewed and are negative.      Physical Exam   BP: 164/84  Pulse: 63  Temp: 97.9  F (36.6  C)  Resp: 16  SpO2: 99 %      Physical Exam  Vitals and nursing note reviewed.   Constitutional:       General: He is in acute distress.      Appearance: Normal appearance. He is normal weight. He is not ill-appearing.   HENT:      Head: Normocephalic.      Mouth/Throat:      Mouth: Mucous membranes are moist.      Pharynx: Oropharynx is clear. No oropharyngeal exudate.   Eyes:      General: No visual field deficit.     Extraocular Movements: Extraocular movements intact.      Conjunctiva/sclera: Conjunctivae normal.      Pupils: Pupils are equal, round, and reactive to light.   Cardiovascular:      Rate and Rhythm: Normal rate and regular rhythm.      Pulses: Normal pulses.      Heart sounds: S1 normal and S2 normal. Murmur heard.   Pulmonary:      Effort: Pulmonary effort is normal. No tachypnea, bradypnea or respiratory distress.      Breath sounds: Normal breath sounds. No stridor. No wheezing or rhonchi.   Musculoskeletal:         General: No swelling, tenderness, deformity or signs of injury.      Right hand: No swelling, deformity, lacerations, tenderness or bony tenderness. Normal range of motion. Normal strength. Decreased sensation (tinlging sensation). Normal capillary refill. Normal pulse.      Left hand: No swelling,  deformity, lacerations, tenderness or bony tenderness. Normal range of motion. Normal strength. Decreased sensation (tinlging ). Normal capillary refill. Normal pulse.      Cervical back: Full passive range of motion without pain. Normal range of motion.      Right lower leg: Normal. No edema.      Left lower leg: Normal. No edema.      Comments: Tingling to hands, feet, face   Skin:     General: Skin is warm and dry.      Coloration: Skin is not cyanotic, jaundiced or pale.      Findings: No bruising, lesion, rash or wound.   Neurological:      General: No focal deficit present.      Mental Status: He is alert and oriented to person, place, and time.      GCS: GCS eye subscore is 4. GCS verbal subscore is 5. GCS motor subscore is 6.      Cranial Nerves: No cranial nerve deficit, dysarthria or facial asymmetry.      Sensory: No sensory deficit.      Motor: Motor function is intact. No weakness, tremor, abnormal muscle tone or pronator drift.      Coordination: Coordination normal. Finger-Nose-Finger Test normal.      Gait: Gait normal.      Deep Tendon Reflexes: Reflexes normal.   Psychiatric:         Attention and Perception: Attention and perception normal.         Mood and Affect: Mood and affect normal.         Speech: Speech normal.         Behavior: Behavior normal. Behavior is cooperative.         Thought Content: Thought content normal.         Cognition and Memory: Cognition normal.         Judgment: Judgment normal.         ED Course                 Procedures                  Results for orders placed or performed during the hospital encounter of 04/05/22 (from the past 24 hour(s))   CBC with platelets differential    Narrative    The following orders were created for panel order CBC with platelets differential.  Procedure                               Abnormality         Status                     ---------                               -----------         ------                     CBC with platelets  and d...[[id number]]  Abnormal            Final result                 Please view results for these tests on the individual orders.   Comprehensive metabolic panel   Result Value Ref Range    Sodium 135 133 - 144 mmol/L    Potassium 3.7 3.4 - 5.3 mmol/L    Chloride 104 94 - 109 mmol/L    Carbon Dioxide (CO2) 25 20 - 32 mmol/L    Anion Gap 6 3 - 14 mmol/L    Urea Nitrogen 18 7 - 30 mg/dL    Creatinine 0.94 0.66 - 1.25 mg/dL    Calcium 9.5 8.5 - 10.1 mg/dL    Glucose 108 (H) 70 - 99 mg/dL    Alkaline Phosphatase 95 40 - 150 U/L    AST 32 0 - 45 U/L    ALT 50 0 - 70 U/L    Protein Total 8.5 6.8 - 8.8 g/dL    Albumin 4.3 3.4 - 5.0 g/dL    Bilirubin Total 1.0 0.2 - 1.3 mg/dL    GFR Estimate 86 >60 mL/min/1.73m2   CK total   Result Value Ref Range     (H) 30 - 300 U/L   CBC with platelets and differential   Result Value Ref Range    WBC Count 8.0 4.0 - 11.0 10e3/uL    RBC Count 4.37 (L) 4.40 - 5.90 10e6/uL    Hemoglobin 14.1 13.3 - 17.7 g/dL    Hematocrit 41.4 40.0 - 53.0 %    MCV 95 78 - 100 fL    MCH 32.3 26.5 - 33.0 pg    MCHC 34.1 31.5 - 36.5 g/dL    RDW 12.8 10.0 - 15.0 %    Platelet Count 249 150 - 450 10e3/uL    % Neutrophils 80 %    % Lymphocytes 14 %    % Monocytes 6 %    % Eosinophils 0 %    % Basophils 0 %    % Immature Granulocytes 0 %    NRBCs per 100 WBC 0 <1 /100    Absolute Neutrophils 6.3 1.6 - 8.3 10e3/uL    Absolute Lymphocytes 1.1 0.8 - 5.3 10e3/uL    Absolute Monocytes 0.5 0.0 - 1.3 10e3/uL    Absolute Eosinophils 0.0 0.0 - 0.7 10e3/uL    Absolute Basophils 0.0 0.0 - 0.2 10e3/uL    Absolute Immature Granulocytes 0.0 <=0.4 10e3/uL    Absolute NRBCs 0.0 10e3/uL   Extra Tube    Narrative    The following orders were created for panel order Extra Tube.  Procedure                               Abnormality         Status                     ---------                               -----------         ------                     Extra Red Top Tube[[id number]]                               Final result                Extra Blood Bank Purple ...[[id number]]                      In process                 Extra Heparinized Syringe[[id number]]                        In process                   Please view results for these tests on the individual orders.   Extra Red Top Tube   Result Value Ref Range    Hold Specimen         Medications - No data to display    Assessments & Plan (with Medical Decision Making)     I have reviewed the nursing notes.    I have reviewed the findings, diagnosis, plan and need for follow up with the patient.  (R20.2) Paresthesia    Comment: Oswald Goel is a 71 year old male who presents to the urgent care with a 2-3 week history of bilateral intermittent tingling to hands, feet, face, and arms x2-3 weeks. He stopped drinking coffee and reduced alcohol intake from 3 drinks per day to one drink per day without relief. He is a non smoker and is not a diabetic. He has not had any new immunizations since shingles vaccine in November of 2021. He recently started taking trazodone for sleep but was having the tingling before starting this medication. He works out for 65 minutes on the [employer] machine most days per week, and the numbness exacerbated by exercise today. He denies headache, dizziness, light-headedness, n/v/d, cough, fever, rashes, congestion, chest pain, and abdominal pain. He also denies recent trauma. He has taken acetaminophen without relief.     MDM: VS WNL and afebrile. Lungs clear, normal mentation, neuro exam normal, heart tones normal with old murmur present.     B12: level pending    CBC: WNL other than glucose of 108    CK: 314    CBC: WNL other than RBC of 4.37    Dr. Benson consulted regarding patient with agreement to stop simvastatin due to elevated CK of 314.    Plan: Encouraged to stop alcohol and caffeine usage. Reduce vigorous exercise if the tingling is present. Follow up with Dr. Dougherty. Return to ED/UC with any concerning symptoms.      These discharge  instructions and medications were reviewed with patinet and understanding verbalized.    Discharge Medication List as of 4/5/2022  5:52 PM          Final diagnoses:   Paresthesia     Corazon Ludwig, Nurse Practitioner Student  [facility]    4/5/2022   [facility]     Ute Schumacher, CNP  04/05/22 1827

## 2022-04-05 NOTE — ED TRIAGE NOTES
Patient has tingling all over his body, intermittently. Over the last couple of weeks. Today, worsened after being at the gym

## 2022-04-05 NOTE — TELEPHONE ENCOUNTER
Pt called and has tingling all over in legs and arms.Both legs and arms.He had this at the last office visit with PCP but did not mention at that time. He has had this since that time but seems to have worsened.He does have it now. He had it this AM after exercising and just went to the gym, came home and has it now.He feels like his BP may be elevated at this time.He denies HA,blurred vision.He denies that is it is a numb feeling but describes as tingling. Denies one side weakness. He thinks maybe the tingling is a little more in the right arm.Attempted to ask more about this and pt not sure if it is worse on the right side,but he thinks it is. PCP not available. Spoke with Trevor. Pt can go to .Advised pt to go to UC/ED. Advised if s/s worsen call 911.He verbalized understanding.    Marcella Shields RN      Reason for Disposition    [1] Numbness or tingling on both sides of body AND [2] is a new symptom present > 24 hours    Additional Information    Negative: [1] SEVERE weakness (i.e., unable to walk or barely able to walk, requires support) AND [2] new onset or worsening    Negative: [1] Weakness (i.e., paralysis, loss of muscle strength) of the face, arm / hand, or leg / foot on one side of the body AND [2] sudden onset AND [3] present now    Negative: [1] Loss of speech or garbled speech AND [2] sudden onset AND [3] present now    Negative: Difficult to awaken or acting confused (e.g., disoriented, slurred speech)    Negative: Sounds like a life-threatening emergency to the triager    Negative: [1] Numbness (i.e., loss of sensation) of the face, arm / hand, or leg / foot on one side of the body AND [2] sudden onset AND [3] present now    Negative: Confusion, disorientation, or hallucinations is main symptom    Negative: Neck pain is main symptom (and having weakness, numbness, or tingling in arm / hand because of neck pain)    Negative: Back pain is main symptom (and having weakness, numbness, or tingling in  leg because of back pain)    Negative: Dizziness is main symptom    Negative: Vision loss or change is main symptom    Negative: Followed a head injury within last 3 days    Negative: Followed a neck injury within last 3 days    Negative: Hand pain is main symptom (and having mild weakness, numbness, or tingling in hand related to hand pain)    Negative: [1] Tingling in both hands and/or feet AND [2] breathing faster than normal AND [3] feels similar to prior panic attack or hyperventilation episode    Negative: Weakness in both sides of the body or weakness all over    Negative: [1] Weakness (i.e., paralysis, loss of muscle strength) of the face, arm / hand, or leg / foot on one side of the body AND [2] sudden onset AND [3] brief (now gone)    Negative: [1] Numbness (i.e., loss of sensation) of the face, arm / hand, or leg / foot on one side of the body AND [2] sudden onset AND [3] brief (now gone)    Negative: [1] Loss of speech or garbled speech AND [2] sudden onset AND [3] brief (now gone)    Negative: Bell's palsy suspected (i.e., weakness on only one side of the face, developing over hours to days, no other symptoms)    Negative: Patient sounds very sick or weak to the triager    Negative: Neck pain (and neurologic deficit)    Negative: Back pain (and neurologic deficit)    Negative: [1] Weakness of the face, arm / hand, or leg / foot on one side of the body AND [2] gradual onset (e.g., days to weeks) AND [3] present now    Negative: [1] Numbness (i.e., loss of sensation) of the face, arm / hand, or leg / foot on one side of the body AND [2] gradual onset (e.g., days to weeks) AND [3] present now    Negative: [1] Loss of speech or garbled speech AND [2] gradual onset (e.g., days to weeks) AND [3] present now    Negative: Headache  (and neurologic deficit)    Negative: [1] Back pain AND [2] numbness (loss of sensation) in groin or rectal area    Negative: [1] Unable to urinate (or only a few drops) > 4 hours  "AND [2] bladder feels very full (e.g., palpable bladder or strong urge to urinate)    Negative: [1] Loss of bladder or bowel control (urine or bowel incontinence; wetting self, leaking stool) AND [2] new onset    Negative: [1] Tingling (e.g., pins and needles) of the face, arm / hand, or leg / foot on one side of the body AND [2] present now    Negative: [1] Numbness or tingling in one or both feet AND [2] is a chronic symptom (recurrent or ongoing AND present > 4 weeks)    Negative: [1] Numbness or tingling in one or both hands AND [2] is a chronic symptom (recurrent or ongoing AND present > 4 weeks)    Negative: [1] Weakness of arm / hand, or leg / foot AND [2] is a chronic symptom (recurrent or ongoing AND present > 4 weeks)    Negative: [1] Loss of speech or garbled speech AND [2] is a chronic symptom (recurrent or ongoing AND present > 4 weeks)    Answer Assessment - Initial Assessment Questions  1. SYMPTOM: \"What is the main symptom you are concerned about?\" (e.g., weakness, numbness)      Tingling all over both legs and both arms  2. ONSET: \"When did this start?\" (minutes, hours, days; while sleeping)      Last week about the time he was in the doctor office but has worsened he did not tell the doctor  3. LAST NORMAL: \"When was the last time you were normal (no symptoms)?\"      Couple weeks ago  4. PATTERN \"Does this come and go, or has it been constant since it started?\"  \"Is it present now?\"      Off and now,it is present now  5. CARDIAC SYMPTOMS: \"Have you had any of the following symptoms: chest pain, difficulty breathing, palpitations?\"      No but have start of COPD,breathing has not worsened,he thinks maybe his BP is elevated  6. NEUROLOGIC SYMPTOMS: \"Have you had any of the following symptoms: headache, dizziness, vision loss, double vision, changes in speech, unsteady on your feet?\"      No -  7. OTHER SYMPTOMS: \"Do you have any other symptoms?\"     no  8. PREGNANCY: \"Is there any chance you are " "pregnant?\" \"When was your last menstrual period?\"     na    Protocols used: NEUROLOGIC DEFICIT-A-AH      "

## 2022-04-06 ENCOUNTER — OFFICE VISIT (OUTPATIENT)
Dept: FAMILY MEDICINE | Facility: OTHER | Age: 72
End: 2022-04-06
Attending: FAMILY MEDICINE
Payer: COMMERCIAL

## 2022-04-06 VITALS
OXYGEN SATURATION: 94 % | WEIGHT: 199.4 LBS | SYSTOLIC BLOOD PRESSURE: 138 MMHG | HEART RATE: 65 BPM | DIASTOLIC BLOOD PRESSURE: 70 MMHG | TEMPERATURE: 97.6 F | HEIGHT: 68 IN | BODY MASS INDEX: 30.22 KG/M2

## 2022-04-06 DIAGNOSIS — G47.09 OTHER INSOMNIA: ICD-10-CM

## 2022-04-06 DIAGNOSIS — R20.2 PARESTHESIAS: Primary | ICD-10-CM

## 2022-04-06 DIAGNOSIS — M62.82 NON-TRAUMATIC RHABDOMYOLYSIS: ICD-10-CM

## 2022-04-06 DIAGNOSIS — E78.2 MIXED HYPERLIPIDEMIA: ICD-10-CM

## 2022-04-06 LAB — VIT B12 SERPL-MCNC: 546 PG/ML (ref 193–986)

## 2022-04-06 PROCEDURE — 99214 OFFICE O/P EST MOD 30 MIN: CPT | Performed by: FAMILY MEDICINE

## 2022-04-06 PROCEDURE — G0463 HOSPITAL OUTPT CLINIC VISIT: HCPCS

## 2022-04-06 ASSESSMENT — PAIN SCALES - GENERAL: PAINLEVEL: NO PAIN (0)

## 2022-04-06 NOTE — NURSING NOTE
"Chief Complaint   Patient presents with     ER F/U       Initial /70 (BP Location: Right arm, Patient Position: Sitting, Cuff Size: Adult Regular)   Pulse 65   Temp 97.6  F (36.4  C) (Tympanic)   Ht 1.727 m (5' 8\")   Wt 90.4 kg (199 lb 6.4 oz)   SpO2 94%   BMI 30.32 kg/m   Estimated body mass index is 30.32 kg/m  as calculated from the following:    Height as of this encounter: 1.727 m (5' 8\").    Weight as of this encounter: 90.4 kg (199 lb 6.4 oz).  Medication Reconciliation: complete  Avril Cummings LPN  "

## 2022-04-06 NOTE — PROGRESS NOTES
Assessment & Plan     Paresthesias  Suspect b12 deficiency second to excess etoh intake, he has cut back, pending results, I anticipate he may need injections but start b12 500mcg daily for now  Follow-up 1 mo    Non-traumatic rhabdomyolysis  Ck slightly elevated, zocor dose was increased last year, may add back at lower dose 20mg in the future when ck normalizes  Continue baby asa  Follow-up 1 mo    Other insomnia  Trazodone is helping  Follow-up 1 mos    (E78.2) Mixed hyperlipidemia  Comment:   Plan: hold off on zocor for now      Review of prior external note(s) from - urgent care  19 minutes spent on the date of the encounter doing chart review, history and exam, documentation and further activities per the note       Patient was agreeable to this plan and had no further questions.  Patient Instructions   1.  Vitamin B12 500mcg -- take daily  2.  Stop zocor for now (don't throw away)  3.  Drink 1 'pot' of water daily!  4.  Acetaminophen -- can take up to 3000mg daily (usually 1000mg 3x/day)  5.  Take baby ASA daily  6.  Activity as tolerated is just fine.      No follow-ups on file.    Meche Dougherty MD  [facility] - VINNY Akers is a 71 year old who presents for the following health issues   HPI     ED/UC Followup:    [facility]  [facility]  Date of visit: 4/5/2022  Reason for visit: Paresthesia  Current Status: bilateral feet numbness, tingling still present       Hyperlipidemia Follow-Up      Are you regularly taking any medication or supplement to lower your cholesterol?   No    Are you having muscle aches or other side effects that you think could be caused by your cholesterol lowering medication?  Yes- possibly but ck elevated to 317      How many servings of fruits and vegetables do you eat daily?  2-3    On average, how many sweetened beverages do you drink each day (Examples: soda, juice, sweet tea, etc.  Do NOT count diet or artificially sweetened beverages)?   1    How  "many days per week do you exercise enough to make your heart beat faster? 5    How many minutes a day do you exercise enough to make your heart beat faster? 30 - 60    How many days per week do you miss taking your medication? 0      Review of Systems   Constitutional, HEENT, cardiovascular, pulmonary, gi and gu systems are negative, except as otherwise noted.      Objective    /70 (BP Location: Right arm, Patient Position: Sitting, Cuff Size: Adult Regular)   Pulse 65   Temp 97.6  F (36.4  C) (Tympanic)   Ht 1.727 m (5' 8\")   Wt 90.4 kg (199 lb 6.4 oz)   SpO2 94%   BMI 30.32 kg/m    Body mass index is 30.32 kg/m .  Physical Exam   GENERAL: healthy, alert and no distress  NECK: no adenopathy, no asymmetry, masses, or scars and thyroid normal to palpation  RESP: lungs clear to auscultation - no rales, rhonchi or wheezes  CV: regular rate and rhythm, normal S1 S2, no S3 or S4, no murmur, click or rub, no peripheral edema and peripheral pulses strong  ABDOMEN: soft, nontender, no hepatosplenomegaly, no masses and bowel sounds normal  MS: no gross musculoskeletal defects noted, no edema  PSYCH: mentation appears normal, affect normal/bright    No results found for any visits on 04/06/22.          "

## 2022-04-06 NOTE — PATIENT INSTRUCTIONS
1.  Vitamin B12 500mcg -- take daily  2.  Stop zocor for now (don't throw away)  3.  Drink 1 'pot' of water daily!  4.  Acetaminophen -- can take up to 3000mg daily (usually 1000mg 3x/day)  5.  Take baby ASA daily  6.  Activity as tolerated is just fine.

## 2022-04-13 ENCOUNTER — TELEPHONE (OUTPATIENT)
Dept: FAMILY MEDICINE | Facility: OTHER | Age: 72
End: 2022-04-13
Payer: COMMERCIAL

## 2022-04-13 NOTE — TELEPHONE ENCOUNTER
3:37 PM    Reason for Call: Phone Call    Description: pt is wondering if Dr Dougherty would order a stress test. Please call pt    Was an appointment offered for this call? No  If yes : Appointment type              Date    Preferred method for responding to this message: Telephone Call  What is your phone number ? [phone]    If we cannot reach you directly, may we leave a detailed response at the number you provided? Yes    Can this message wait until your PCP/provider returns, if available today? YES    Lelia Parisi

## 2022-04-14 ENCOUNTER — NURSE TRIAGE (OUTPATIENT)
Dept: FAMILY MEDICINE | Facility: OTHER | Age: 72
End: 2022-04-14
Payer: COMMERCIAL

## 2022-04-14 NOTE — TELEPHONE ENCOUNTER
"Patient is returning the providers nurse call regarding the prior encounter. Patient took his blood pressure today at [facility] and he think's he may need a stress test.      Reason for Disposition    Systolic BP >= 130 OR Diastolic >= 80, and is taking BP medications    Answer Assessment - Initial Assessment Questions  1. BLOOD PRESSURE: \"What is the blood pressure?\" \"Did you take at least two measurements 5 minutes apart?\"      150's/70  At [facility]  2. ONSET: \"When did you take your blood pressure?\"      Today  3. HOW: \"How did you obtain the blood pressure?\" (e.g., visiting nurse, automatic home BP monitor)      automatic  4. HISTORY: \"Do you have a history of high blood pressure?\"      yes  5. MEDICATIONS: \"Are you taking any medications for blood pressure?\" \"Have you missed any doses recently?\"      yes  6. OTHER SYMPTOMS: \"Do you have any symptoms?\" (e.g., headache, chest pain, blurred vision, difficulty breathing, weakness)      Sometimes feels sweaty  Sometimes when he eats he has a little different in his chest  SOB but does have COPD and seem's to be getting worse, sometimes feet and hands tingle   7. PREGNANCY: \"Is there any chance you are pregnant?\" \"When was your last menstrual period?\"      no    Protocols used: HIGH BLOOD PRESSURE-A-OH      "

## 2022-04-20 ENCOUNTER — OFFICE VISIT (OUTPATIENT)
Dept: FAMILY MEDICINE | Facility: OTHER | Age: 72
End: 2022-04-20
Attending: FAMILY MEDICINE
Payer: COMMERCIAL

## 2022-04-20 VITALS
WEIGHT: 200 LBS | HEIGHT: 68 IN | HEART RATE: 63 BPM | TEMPERATURE: 97.7 F | OXYGEN SATURATION: 97 % | BODY MASS INDEX: 30.31 KG/M2 | DIASTOLIC BLOOD PRESSURE: 60 MMHG | SYSTOLIC BLOOD PRESSURE: 130 MMHG

## 2022-04-20 DIAGNOSIS — J43.9 PULMONARY EMPHYSEMA, UNSPECIFIED EMPHYSEMA TYPE (H): Primary | ICD-10-CM

## 2022-04-20 DIAGNOSIS — M79.605 BILATERAL LOWER EXTREMITY PAIN: ICD-10-CM

## 2022-04-20 DIAGNOSIS — E78.2 MIXED HYPERLIPIDEMIA: ICD-10-CM

## 2022-04-20 DIAGNOSIS — I10 ESSENTIAL HYPERTENSION, BENIGN: ICD-10-CM

## 2022-04-20 DIAGNOSIS — M79.604 BILATERAL LOWER EXTREMITY PAIN: ICD-10-CM

## 2022-04-20 PROCEDURE — G0463 HOSPITAL OUTPT CLINIC VISIT: HCPCS

## 2022-04-20 PROCEDURE — 99214 OFFICE O/P EST MOD 30 MIN: CPT | Performed by: FAMILY MEDICINE

## 2022-04-20 RX ORDER — VITAMIN B COMPLEX
3 TABLET ORAL DAILY
COMMUNITY

## 2022-04-20 RX ORDER — ASPIRIN 81 MG/1
81 TABLET, CHEWABLE ORAL DAILY
COMMUNITY
End: 2024-05-07 | Stop reason: SINTOL

## 2022-04-20 RX ORDER — AMLODIPINE BESYLATE 5 MG/1
7.5 TABLET ORAL DAILY
Qty: 135 TABLET | Refills: 2 | Status: SHIPPED | OUTPATIENT
Start: 2022-04-20 | End: 2023-01-04

## 2022-04-20 RX ORDER — SIMVASTATIN 20 MG
20 TABLET ORAL AT BEDTIME
Qty: 90 TABLET | Refills: 1 | Status: SHIPPED | OUTPATIENT
Start: 2022-04-20 | End: 2022-06-10 | Stop reason: DRUGHIGH

## 2022-04-20 RX ORDER — MONTELUKAST SODIUM 10 MG/1
10 TABLET ORAL AT BEDTIME
Qty: 30 TABLET | Refills: 1 | Status: SHIPPED | OUTPATIENT
Start: 2022-04-20 | End: 2022-06-10

## 2022-04-20 ASSESSMENT — PAIN SCALES - GENERAL: PAINLEVEL: MODERATE PAIN (4)

## 2022-04-20 NOTE — NURSING NOTE
"Chief Complaint   Patient presents with     Hypertension     Hospital F/U       Initial /60 (BP Location: Left arm, Patient Position: Sitting, Cuff Size: Adult Regular)   Pulse 63   Temp 97.7  F (36.5  C) (Tympanic)   Ht 1.727 m (5' 8\")   Wt 90.7 kg (200 lb)   SpO2 97%   BMI 30.41 kg/m   Estimated body mass index is 30.41 kg/m  as calculated from the following:    Height as of this encounter: 1.727 m (5' 8\").    Weight as of this encounter: 90.7 kg (200 lb).  Medication Reconciliation: complete  Janneth Enciso LPN  "

## 2022-04-20 NOTE — PROGRESS NOTES
Assessment & Plan     Essential hypertension, benign  This dose is working well, continue and refill given  - amLODIPine (NORVASC) 5 MG tablet; Take 1.5 tablets (7.5 mg) by mouth daily    Pulmonary emphysema, unspecified emphysema type (H)  Start singulair and follow-up 6-7 wks to see if effective for his shortness of breath, reviewed his previous stress test and echo and no concerns there.  He is working out almost daily without symptoms  - montelukast (SINGULAIR) 10 MG tablet; Take 1 tablet (10 mg) by mouth At Bedtime    Mixed hyperlipidemia  Restart at 20mg dose, CK was normal  - simvastatin (ZOCOR) 20 MG tablet; Take 1 tablet (20 mg) by mouth At Bedtime    Bilateral lower extremity pain  b12 normal, needs EMG to further assess  - Adult Neurology [facility] Referral    Provider  Link to [facility] Help Grid :[id number]}    Patient was agreeable to this plan and had no further questions.  There are no Patient Instructions on file for this visit.    No follow-ups on file.    Meche Dougherty MD  [facility] - ABDIELJESSICA Akers is a 71 year old who presents for the following health issues     HPI     Hyperlipidemia Follow-Up      Are you regularly taking any medication or supplement to lower your cholesterol?   No    Are you having muscle aches or other side effects that you think could be caused by your cholesterol lowering medication?  No    Hypertension Follow-up      Do you check your blood pressure regularly outside of the clinic? Yes     Are you following a low salt diet? Yes    Are your blood pressures ever more than 140 on the top number (systolic) OR more   than 90 on the bottom number (diastolic), for example 140/90? Yes       How many servings of fruits and vegetables do you eat daily?  0-1    On average, how many sweetened beverages do you drink each day (Examples: soda, juice, sweet tea, etc.  Do NOT count diet or artificially sweetened beverages)?   1    How many days per week do you  "exercise enough to make your heart beat faster? 7    How many minutes a day do you exercise enough to make your heart beat faster? 30 - 60    How many days per week do you miss taking your medication? 0    ED/UC Followup:    Facility:  [facility] [place]  Date of visit: 4/5/22  Reason for visit: Paresthesia, HTN  Current Status: Still having tingling all over.   \Pt stated not as bad, Still feels it in his feet.  Feels SOB and chest discomfort. Mild pain in feet         Review of Systems   Constitutional, HEENT, cardiovascular, pulmonary, gi and gu systems are negative, except as otherwise noted.      Objective    /60 (BP Location: Left arm, Patient Position: Sitting, Cuff Size: Adult Regular)   Pulse 63   Temp 97.7  F (36.5  C) (Tympanic)   Ht 1.727 m (5' 8\")   Wt 90.7 kg (200 lb)   SpO2 97%   BMI 30.41 kg/m    Body mass index is 30.41 kg/m .  Physical Exam   GENERAL: healthy, alert and no distress  NECK: no adenopathy, no asymmetry, masses, or scars and thyroid normal to palpation  RESP: lungs clear to auscultation - no rales, rhonchi or wheezes  CV: regular rate and rhythm, normal S1 S2, no S3 or S4, no murmur, click or rub, no peripheral edema and peripheral pulses strong  ABDOMEN: soft, nontender, no hepatosplenomegaly, no masses and bowel sounds normal  MS: no gross musculoskeletal defects noted, no edema  PSYCH: mentation appears normal, affect normal/bright    No results found for any visits on 04/20/22.          "

## 2022-04-23 ENCOUNTER — TRANSCRIBE ORDERS (OUTPATIENT)
Dept: OTHER | Age: 72
End: 2022-04-23
Payer: COMMERCIAL

## 2022-04-23 DIAGNOSIS — M79.604 BILATERAL LOWER EXTREMITY PAIN: Primary | ICD-10-CM

## 2022-04-23 DIAGNOSIS — M79.605 BILATERAL LOWER EXTREMITY PAIN: Primary | ICD-10-CM

## 2022-06-09 NOTE — PROGRESS NOTES
SUBJECTIVE:   Oswald Goel is a 71 year old male who presents for Preventive Visit.    Patient has been advised of split billing requirements and indicates understanding: Yes  Are you in the first 12 months of your Medicare coverage?  No    HPI  Do you feel safe in your environment? Yes    Have you ever done Advance Care Planning? (For example, a Health Directive, POLST, or a discussion with a medical provider or your loved ones about your wishes): No, advance care planning information given to patient to review.  Patient plans to discuss their wishes with loved ones or provider.         Fall risk  Fallen 2 or more times in the past year?: No  Any fall with injury in the past year?: No    Cognitive Screening   1) Repeat 3 items (Leader, Season, Table)    2) Clock draw: NORMAL  3) 3 item recall: Recalls 3 objects  Results: 3 items recalled: COGNITIVE IMPAIRMENT LESS LIKELY    Mini-CogTM Copyright S Claude. Licensed by the author for use in [employer]; reprinted with permission (jackie@[facility]). All rights reserved.      Do you have sleep apnea, excessive snoring or daytime drowsiness?: no    Reviewed and updated as needed this visit by clinical staff                    Reviewed and updated as needed this visit by Provider                   Social History     Tobacco Use     Smoking status: Former Smoker     Packs/day: 0.10     Years: 2.00     Pack years: 0.20     Types: Cigarettes     Smokeless tobacco: Former User     Tobacco comment: no passive exposure, tried to quit-yes   Substance Use Topics     Alcohol use: Yes     Comment: daily      If you drink alcohol do you typically have >3 drinks per day or >7 drinks per week? Yes    CHEST PAIN.  Chest Pain      Onset: few weeks    Description (location/character/radiation/duration): upper sternum    Intensity:  moderate    Accompanying signs and symptoms:        Shortness of breath: no        Sweating: no        Nausea/vomitting: no        Palpitations: no         Other (fevers/chills/cough/heartburn/lightheadedness): no     History (similar episodes/previous evaluation): None    Precipitating or alleviating factors:       Worse with exertion: no        Worse with breathing: no        Related to eating: no        Better with burping: no     Therapies tried and outcome: none but works out daily and has no trouble       Current providers sharing in care for this patient include:   Patient Care Team:  Meche Dougherty MD as PCP - General (Family Medicine)  Adrian Washington DO as Assigned Heart and Vascular Provider  Eh Villa MD as Assigned Surgical Provider  Meche Dougherty MD as Assigned PCP    The following health maintenance items are reviewed in Epic and correct as of today:  Health Maintenance Due   Topic Date Due     COPD ACTION PLAN  Never done     LUNG CANCER SCREENING  Never done     Pneumococcal Vaccine: 65+ Years (2 - PPSV23 or PCV20) 01/25/2019     DTAP/TDAP/TD IMMUNIZATION (2 - Td or Tdap) 04/30/2020     COVID-19 Vaccine (4 - Booster for Pfizer series) 02/01/2022     MEDICARE ANNUAL WELLNESS VISIT  03/11/2022     Lab work is in process  Labs reviewed in EPIC  BP Readings from Last 3 Encounters:   06/10/22 130/60   04/20/22 130/60   04/06/22 138/70    Wt Readings from Last 3 Encounters:   06/10/22 89.7 kg (197 lb 12.8 oz)   04/20/22 90.7 kg (200 lb)   04/06/22 90.4 kg (199 lb 6.4 oz)                  Patient Active Problem List   Diagnosis     Advanced care planning/counseling discussion     Mixed hyperlipidemia     Hypertrophy of prostate without urinary obstruction     Nonallopathic lesion of cervical region     Dysthymic disorder     Essential hypertension, benign     RBBB     Primary localized osteoarthrosis, pelvic region and thigh     ACP (advance care planning)     Colon cancer screening     Tingling     History of tobacco abuse     TENORIO (dyspnea on exertion)     History of left heart catheterization 40 to 50 years ago at the [facility]      Coronary artery disease involving native coronary artery of native heart without angina pectoris     History of COVID-19 on 11/20/2020     Regular alcohol consumption     Hypertriglyceridemia     Screen for colon cancer     * * * SBE PROPHYLAXIS * * *     Other insomnia     Pure hypercholesterolemia     Hypovitaminosis D     Iron deficiency     Non-traumatic rhabdomyolysis     Paresthesias     Pulmonary emphysema, unspecified emphysema type (H)     Bilateral lower extremity pain     Past Surgical History:   Procedure Laterality Date     COLONOSCOPY  03/01/2011    repeat 10 yrs     COLONOSCOPY  01/01/2005     COLONOSCOPY N/A 07/29/2021    Procedure: Colonoscopy;  Surgeon: Eh Villa MD;  Location: [facility] OR     HERNIA REPAIR N/A 1998    unknown     left arthroscopy Left 1968    knee -- Dr Love     RELEASE CARPAL TUNNEL Bilateral     Dr Gonsalez     right shoulder replacement Right     Dr Trinh     right total hip replacement N/A     Dr Hurtado     TONSILLECTOMY       TRANSPOSITION ULNAR NERVE (ELBOW) Left 11/19/2015    Procedure: TRANSPOSITION ULNAR NERVE (ELBOW);  Surgeon: Mahesh Capps MD;  Location: [facility] OR       Social History     Tobacco Use     Smoking status: Former Smoker     Packs/day: 0.10     Years: 2.00     Pack years: 0.20     Types: Cigarettes     Smokeless tobacco: Former User     Tobacco comment: no passive exposure, tried to quit-yes   Substance Use Topics     Alcohol use: Yes     Comment: daily      Family History   Problem Relation Age of Onset     Heart Disease Mother         CABG     Pancreatic Cancer Mother 81     Other - See Comments Father 84     Family History Negative Sister      Family History Negative Sister      Unknown/Adopted Maternal Grandmother      Unknown/Adopted Maternal Grandfather      Unknown/Adopted Paternal Grandmother      Unknown/Adopted Paternal Grandfather          Current Outpatient Medications   Medication Sig Dispense Refill     acetaminophen (TYLENOL) 500 MG  tablet Take 500-1,000 mg by mouth every 6 hours as needed for mild pain       amLODIPine (NORVASC) 5 MG tablet Take 1.5 tablets (7.5 mg) by mouth daily 135 tablet 2     aspirin (ASA) 81 MG chewable tablet Take 81 mg by mouth daily       cyanocobalamin (VITAMIN B-12) 500 MCG SUBL sublingual tablet Place 500 mcg under the tongue daily       Ferrous Gluconate 240 (27 Fe) MG TABS Take 1 mg by mouth daily       fish oil-omega-3 fatty acids 1000 MG capsule Take 1 g by mouth daily       lisinopril (ZESTRIL) 20 MG tablet Take 1 tablet (20 mg) by mouth daily 90 tablet 2     Milk Thistle-Dand-Fennel-Licor (MILK THISTLE XTRA) CAPS capsule        montelukast (SINGULAIR) 10 MG tablet Take 1 tablet (10 mg) by mouth At Bedtime 30 tablet 1     Multiple Vitamin (DAILY MULTIVITAMIN PO) Take 1 tablet by Oral route every day with food       simvastatin (ZOCOR) 40 MG tablet Take 0.5 tablets (20 mg) by mouth At Bedtime 90 tablet 1     traZODone (DESYREL) 50 MG tablet Take 0.5-1 tablets (25-50 mg) by mouth At Bedtime 30 tablet 1     Turmeric 500 MG TABS        vitamin C (ASCORBIC ACID) 1000 MG TABS Take 1,000 mg by mouth daily       Vitamin D3 (CHOLECALCIFEROL) 25 mcg (1000 units) tablet Take 3 tablets by mouth daily       Allergies   Allergen Reactions     Animal Dander      Deer hair     Pneumonia Vaccine:For adults with an immunocompromising condition, cerebrospinal fluid leak, or cochlear implant, administer 1 dose of PCV13 first then give 1 dose of PPSV23 at least 1 year later. Anyone who received any doses of PPSV23 before age 65 should receive 1 final dose of the vaccine at age 65 or older. Administer this last dose at least 5 years after the prior PPSV23 dose.    Review of Systems  Constitutional, HEENT, cardiovascular, pulmonary, GI, , musculoskeletal, neuro, skin, endocrine and psych systems are negative, except as otherwise noted.    OBJECTIVE:   There were no vitals taken for this visit. Estimated body mass index is 30.41  "kg/m  as calculated from the following:    Height as of 4/20/22: 1.727 m (5' 8\").    Weight as of 4/20/22: 90.7 kg (200 lb).  Physical Exam  GENERAL: healthy, alert and no distress  EYES: Eyes grossly normal to inspection, PERRL and conjunctivae and sclerae normal  HENT: ear canals and TM's normal, nose and mouth without ulcers or lesions  NECK: no adenopathy, no asymmetry, masses, or scars and thyroid normal to palpation  RESP: lungs clear to auscultation - no rales, rhonchi or wheezes  CV: regular rate and rhythm, normal S1 S2, no S3 or S4, no murmur, click or rub, no peripheral edema and peripheral pulses strong  ABDOMEN: soft, nontender, no hepatosplenomegaly, no masses and bowel sounds normal   (male): deferred  MS: no gross musculoskeletal defects noted, no edema  SKIN: no suspicious lesions or rashes  NEURO: Normal strength and tone, mentation intact and speech normal  PSYCH: mentation appears normal, affect normal/bright    Diagnostic Test Results:  Labs reviewed in Epic  Results for orders placed or performed in visit on 06/10/22   Extra Tube     Status: None    Narrative    The following orders were created for panel order Extra Tube.  Procedure                               Abnormality         Status                     ---------                               -----------         ------                     Extra Green Top (Lithium...[[id number]]                      Final result               Extra Purple Top Tube[[id number]]                            Final result                 Please view results for these tests on the individual orders.   Extra Green Top (Lithium Heparin) Tube     Status: None   Result Value Ref Range    Hold Specimen JIC    Extra Purple Top Tube     Status: None   Result Value Ref Range    Hold Specimen JIC        ASSESSMENT / PLAN:   (Z00.00) Encounter for Medicare annual wellness exam  (primary encounter diagnosis)  Comment:   Plan: follow-up 1 year, sooner if problems    (Z12.5) " "Screening for prostate cancer  Comment:   Plan: PSA, screen        good    (E61.1) Iron deficiency  Comment:   Plan: Iron and iron binding capacity, Ferritin        Labs improved ok to stop iron    (E78.00) Pure hypercholesterolemia  Comment:   Plan: simvastatin (ZOCOR) 40 MG tablet        Takes 1/2 tablet    (R07.89) Other chest pain  Comment: new onset, not with activity, suspect MSK  Plan: EKG 12-lead complete w/read - (Clinic         Performed), XR CHEST 2 VW (Clinic Performed),         Troponin I,           (J43.9) Pulmonary emphysema, unspecified emphysema type (H)  Comment:   Plan: montelukast (SINGULAIR) 10 MG tablet        Refill, follow-up if not improving    (G47.09) Other insomnia  Comment:   Plan: traZODone (DESYREL) 50 MG tablet        This is working well for him    (Z23) Need for vaccination  Comment:   Plan: Pneumococcal vaccine 23 valent PPSV23          (Pneumovax)        done    COUNSELING:  Reviewed preventive health counseling, as reflected in patient instructions  Special attention given to:       Regular exercise       Healthy diet/nutrition       Vision screening       Hearing screening       Dental care       Prostate cancer screening    Estimated body mass index is 30.41 kg/m  as calculated from the following:    Height as of 4/20/22: 1.727 m (5' 8\").    Weight as of 4/20/22: 90.7 kg (200 lb).    Weight management plan: Discussed healthy diet and exercise guidelines    He reports that he has quit smoking. His smoking use included cigarettes. He has a 0.20 pack-year smoking history. He has quit using smokeless tobacco.      Appropriate preventive services were discussed with this patient, including applicable screening as appropriate for cardiovascular disease, diabetes, osteopenia/osteoporosis, and glaucoma.  As appropriate for age/gender, discussed screening for colorectal cancer, prostate cancer, breast cancer, and cervical cancer. Checklist reviewing preventive services available has " been given to the patient.    Reviewed patients plan of care and provided an AVS. The Intermediate Care Plan ( asthma action plan, low back pain action plan, and migraine action plan) for Oswald meets the Care Plan requirement. This Care Plan has been established and reviewed with the Patient.    Counseling Resources:  ATP IV Guidelines  Pooled Cohorts Equation Calculator  Breast Cancer Risk Calculator  Breast Cancer: Medication to Reduce Risk  FRAX Risk Assessment  ICSI Preventive Guidelines  Dietary Guidelines for Americans, 2010  USDA's MyPlate  ASA Prophylaxis  Lung CA Screening    Meche Dougherty MD  [facility] - [place]    Identified Health Risks:

## 2022-06-10 ENCOUNTER — ANCILLARY PROCEDURE (OUTPATIENT)
Dept: GENERAL RADIOLOGY | Facility: OTHER | Age: 72
End: 2022-06-10
Attending: FAMILY MEDICINE
Payer: COMMERCIAL

## 2022-06-10 ENCOUNTER — LAB (OUTPATIENT)
Dept: LAB | Facility: OTHER | Age: 72
End: 2022-06-10
Attending: FAMILY MEDICINE
Payer: COMMERCIAL

## 2022-06-10 ENCOUNTER — OFFICE VISIT (OUTPATIENT)
Dept: FAMILY MEDICINE | Facility: OTHER | Age: 72
End: 2022-06-10
Attending: FAMILY MEDICINE
Payer: COMMERCIAL

## 2022-06-10 VITALS
HEART RATE: 56 BPM | SYSTOLIC BLOOD PRESSURE: 130 MMHG | DIASTOLIC BLOOD PRESSURE: 60 MMHG | OXYGEN SATURATION: 97 % | WEIGHT: 197.8 LBS | TEMPERATURE: 97.5 F | HEIGHT: 66 IN | RESPIRATION RATE: 18 BRPM | BODY MASS INDEX: 31.79 KG/M2

## 2022-06-10 DIAGNOSIS — Z12.5 SCREENING FOR PROSTATE CANCER: ICD-10-CM

## 2022-06-10 DIAGNOSIS — E61.1 IRON DEFICIENCY: ICD-10-CM

## 2022-06-10 DIAGNOSIS — R07.89 OTHER CHEST PAIN: ICD-10-CM

## 2022-06-10 DIAGNOSIS — G47.09 OTHER INSOMNIA: ICD-10-CM

## 2022-06-10 DIAGNOSIS — Z23 NEED FOR VACCINATION: ICD-10-CM

## 2022-06-10 DIAGNOSIS — E78.00 PURE HYPERCHOLESTEROLEMIA: ICD-10-CM

## 2022-06-10 DIAGNOSIS — J43.9 PULMONARY EMPHYSEMA, UNSPECIFIED EMPHYSEMA TYPE (H): ICD-10-CM

## 2022-06-10 DIAGNOSIS — Z00.00 ENCOUNTER FOR MEDICARE ANNUAL WELLNESS EXAM: Primary | ICD-10-CM

## 2022-06-10 LAB
FERRITIN SERPL-MCNC: 215 NG/ML (ref 26–388)
HOLD SPECIMEN: NORMAL
HOLD SPECIMEN: NORMAL
IRON SATN MFR SERPL: 29 % (ref 15–46)
IRON SERPL-MCNC: 96 UG/DL (ref 35–180)
PSA SERPL-MCNC: 0.21 UG/L (ref 0–4)
TIBC SERPL-MCNC: 336 UG/DL (ref 240–430)
TROPONIN I SERPL HS-MCNC: 9 NG/L

## 2022-06-10 PROCEDURE — 93010 ELECTROCARDIOGRAM REPORT: CPT | Mod: 77 | Performed by: INTERNAL MEDICINE

## 2022-06-10 PROCEDURE — 71046 X-RAY EXAM CHEST 2 VIEWS: CPT | Mod: TC

## 2022-06-10 PROCEDURE — 36415 COLL VENOUS BLD VENIPUNCTURE: CPT | Mod: ZL

## 2022-06-10 PROCEDURE — 84484 ASSAY OF TROPONIN QUANT: CPT | Mod: ZL | Performed by: FAMILY MEDICINE

## 2022-06-10 PROCEDURE — 90732 PPSV23 VACC 2 YRS+ SUBQ/IM: CPT

## 2022-06-10 PROCEDURE — G0463 HOSPITAL OUTPT CLINIC VISIT: HCPCS | Mod: 25

## 2022-06-10 PROCEDURE — 36415 COLL VENOUS BLD VENIPUNCTURE: CPT | Mod: ZL | Performed by: FAMILY MEDICINE

## 2022-06-10 PROCEDURE — G0103 PSA SCREENING: HCPCS | Mod: ZL | Performed by: FAMILY MEDICINE

## 2022-06-10 PROCEDURE — 93005 ELECTROCARDIOGRAM TRACING: CPT

## 2022-06-10 PROCEDURE — 99213 OFFICE O/P EST LOW 20 MIN: CPT | Mod: 25 | Performed by: FAMILY MEDICINE

## 2022-06-10 PROCEDURE — 83550 IRON BINDING TEST: CPT | Mod: ZL | Performed by: FAMILY MEDICINE

## 2022-06-10 PROCEDURE — 82728 ASSAY OF FERRITIN: CPT | Mod: ZL | Performed by: FAMILY MEDICINE

## 2022-06-10 PROCEDURE — G0439 PPPS, SUBSEQ VISIT: HCPCS | Performed by: FAMILY MEDICINE

## 2022-06-10 RX ORDER — MONTELUKAST SODIUM 10 MG/1
10 TABLET ORAL AT BEDTIME
Qty: 90 TABLET | Refills: 2 | Status: SHIPPED | OUTPATIENT
Start: 2022-06-10 | End: 2022-06-15

## 2022-06-10 RX ORDER — TRAZODONE HYDROCHLORIDE 50 MG/1
25-50 TABLET, FILM COATED ORAL AT BEDTIME
Qty: 45 TABLET | Refills: 2 | Status: SHIPPED | OUTPATIENT
Start: 2022-06-10 | End: 2023-05-04

## 2022-06-10 RX ORDER — SIMVASTATIN 40 MG
20 TABLET ORAL AT BEDTIME
Qty: 90 TABLET | Refills: 1 | Status: SHIPPED | OUTPATIENT
Start: 2022-06-10 | End: 2023-02-08 | Stop reason: DRUGHIGH

## 2022-06-10 ASSESSMENT — PATIENT HEALTH QUESTIONNAIRE - PHQ9: SUM OF ALL RESPONSES TO PHQ QUESTIONS 1-9: 3

## 2022-06-10 ASSESSMENT — ENCOUNTER SYMPTOMS
NAUSEA: 0
PARESTHESIAS: 0
FREQUENCY: 0
JOINT SWELLING: 0
MYALGIAS: 0
SORE THROAT: 0
DIARRHEA: 0
SHORTNESS OF BREATH: 1
WEAKNESS: 0
PALPITATIONS: 0
HEMATURIA: 0
EYE PAIN: 0
FEVER: 0
NERVOUS/ANXIOUS: 1
HEMATOCHEZIA: 0
ABDOMINAL PAIN: 0
DYSURIA: 0
HEADACHES: 0
DIZZINESS: 0
ARTHRALGIAS: 0
COUGH: 0
CHILLS: 0
HEARTBURN: 0
CONSTIPATION: 0

## 2022-06-10 ASSESSMENT — PAIN SCALES - GENERAL: PAINLEVEL: MILD PAIN (2)

## 2022-06-10 ASSESSMENT — ACTIVITIES OF DAILY LIVING (ADL): CURRENT_FUNCTION: NO ASSISTANCE NEEDED

## 2022-06-10 NOTE — LETTER
Joann 10, 2022      Oswald L Amando  [address] [facility] [address]        Dear ,    We are writing to inform you of your test results.    Your labs and chest xray are normal.    Resulted Orders   PSA, screen   Result Value Ref Range    Prostate Specific Antigen Screen 0.21 0.00 - 4.00 ug/L    Narrative    Assay Method:  Chemiluminescence using Siemens   Vista analyzer.   Iron and iron binding capacity   Result Value Ref Range    Iron 96 35 - 180 ug/dL    Iron Binding Capacity 336 240 - 430 ug/dL    Iron Sat Index 29 15 - 46 %   Ferritin   Result Value Ref Range    Ferritin 215 26 - 388 ng/mL   Troponin I   Result Value Ref Range    Troponin I High Sensitivity 9 <79 ng/L      Comment:      This Troponin-I result was obtained using a Siemens Dimension Vista High Sensitivity Troponin-I assay (TNIH). Effective 11/23/21, nine labs/sites in the [facility] switched from a Siemens Sterrett Contemporary Troponin I assay (CTNI) to a Siemens Sterrett High-Sensitivity Troponin I assay (TNIH).       If you have any questions or concerns, please call the clinic at the number listed above.       Sincerely,      Meche Dougherty MD

## 2022-06-10 NOTE — NURSING NOTE
"Chief Complaint   Patient presents with     Physical       Initial /60   Pulse 56   Temp 97.5  F (36.4  C) (Tympanic)   Resp 18   Ht 1.664 m (5' 5.5\")   Wt 89.7 kg (197 lb 12.8 oz)   SpO2 97%   BMI 32.42 kg/m   Estimated body mass index is 32.42 kg/m  as calculated from the following:    Height as of this encounter: 1.664 m (5' 5.5\").    Weight as of this encounter: 89.7 kg (197 lb 12.8 oz).  Medication Reconciliation: complete  Julieta Reed LPN    "

## 2022-06-14 DIAGNOSIS — J43.9 PULMONARY EMPHYSEMA, UNSPECIFIED EMPHYSEMA TYPE (H): ICD-10-CM

## 2022-06-15 RX ORDER — MONTELUKAST SODIUM 10 MG/1
1 TABLET ORAL AT BEDTIME
Qty: 90 TABLET | Refills: 3 | Status: SHIPPED | OUTPATIENT
Start: 2022-06-15 | End: 2023-03-07

## 2022-10-20 ENCOUNTER — ALLIED HEALTH/NURSE VISIT (OUTPATIENT)
Dept: FAMILY MEDICINE | Facility: OTHER | Age: 72
End: 2022-10-20
Attending: FAMILY MEDICINE
Payer: COMMERCIAL

## 2022-10-20 DIAGNOSIS — Z23 NEED FOR VACCINATION: Primary | ICD-10-CM

## 2022-10-20 PROCEDURE — G0008 ADMIN INFLUENZA VIRUS VAC: HCPCS

## 2022-10-24 NOTE — NURSING NOTE
"Chief Complaint   Patient presents with     Lesion     Pt has been referred by STEPHANY Lowery for lesion on the right ear.       Initial /72   Pulse 52   Temp 96.4  F (35.8  C) (Tympanic)   Ht 1.676 m (5' 6\")   Wt 95.3 kg (210 lb)   SpO2 96%   BMI 33.89 kg/m   Estimated body mass index is 33.89 kg/m  as calculated from the following:    Height as of this encounter: 1.676 m (5' 6\").    Weight as of this encounter: 95.3 kg (210 lb).  Medication Reconciliation: complete  Abbey Barron LPN  " 9

## 2022-10-31 ENCOUNTER — OFFICE VISIT (OUTPATIENT)
Dept: FAMILY MEDICINE | Facility: OTHER | Age: 72
End: 2022-10-31
Attending: FAMILY MEDICINE
Payer: COMMERCIAL

## 2022-10-31 ENCOUNTER — ANCILLARY PROCEDURE (OUTPATIENT)
Dept: GENERAL RADIOLOGY | Facility: OTHER | Age: 72
End: 2022-10-31
Attending: FAMILY MEDICINE
Payer: COMMERCIAL

## 2022-10-31 ENCOUNTER — TELEPHONE (OUTPATIENT)
Dept: FAMILY MEDICINE | Facility: OTHER | Age: 72
End: 2022-10-31

## 2022-10-31 VITALS
TEMPERATURE: 97.2 F | DIASTOLIC BLOOD PRESSURE: 70 MMHG | HEART RATE: 52 BPM | WEIGHT: 193.8 LBS | BODY MASS INDEX: 31.15 KG/M2 | HEIGHT: 66 IN | OXYGEN SATURATION: 97 % | SYSTOLIC BLOOD PRESSURE: 152 MMHG

## 2022-10-31 DIAGNOSIS — Z78.9 REGULAR ALCOHOL CONSUMPTION: ICD-10-CM

## 2022-10-31 DIAGNOSIS — R07.89 OTHER CHEST PAIN: ICD-10-CM

## 2022-10-31 DIAGNOSIS — Z98.890 HISTORY OF LEFT HEART CATHETERIZATION: ICD-10-CM

## 2022-10-31 DIAGNOSIS — I45.10 RIGHT BUNDLE BRANCH BLOCK: ICD-10-CM

## 2022-10-31 DIAGNOSIS — I25.10 CORONARY ARTERY DISEASE INVOLVING NATIVE CORONARY ARTERY OF NATIVE HEART WITHOUT ANGINA PECTORIS: ICD-10-CM

## 2022-10-31 DIAGNOSIS — R07.89 ATYPICAL CHEST PAIN: Primary | ICD-10-CM

## 2022-10-31 DIAGNOSIS — I10 ESSENTIAL HYPERTENSION, BENIGN: ICD-10-CM

## 2022-10-31 DIAGNOSIS — R06.9 UNSPECIFIED ABNORMALITIES OF BREATHING: ICD-10-CM

## 2022-10-31 DIAGNOSIS — D64.9 ANEMIA OF UNKNOWN ETIOLOGY: ICD-10-CM

## 2022-10-31 LAB
ALBUMIN SERPL BCG-MCNC: 4.3 G/DL (ref 3.5–5.2)
ALP SERPL-CCNC: 94 U/L (ref 40–129)
ALT SERPL W P-5'-P-CCNC: 30 U/L (ref 10–50)
ANION GAP SERPL CALCULATED.3IONS-SCNC: 10 MMOL/L (ref 7–15)
AST SERPL W P-5'-P-CCNC: 33 U/L (ref 10–50)
BILIRUB SERPL-MCNC: 1 MG/DL
BUN SERPL-MCNC: 20.7 MG/DL (ref 8–23)
CALCIUM SERPL-MCNC: 9.6 MG/DL (ref 8.8–10.2)
CHLORIDE SERPL-SCNC: 104 MMOL/L (ref 98–107)
CREAT SERPL-MCNC: 1.07 MG/DL (ref 0.67–1.17)
DEPRECATED HCO3 PLAS-SCNC: 24 MMOL/L (ref 22–29)
ERYTHROCYTE [DISTWIDTH] IN BLOOD BY AUTOMATED COUNT: 12.7 % (ref 10–15)
FERRITIN SERPL-MCNC: 320 NG/ML (ref 31–409)
GFR SERPL CREATININE-BSD FRML MDRD: 73 ML/MIN/1.73M2
GLUCOSE SERPL-MCNC: 90 MG/DL (ref 70–99)
HCT VFR BLD AUTO: 37.3 % (ref 40–53)
HGB BLD-MCNC: 12.5 G/DL (ref 13.3–17.7)
IRON BINDING CAPACITY (ROCHE): 280 UG/DL (ref 240–430)
IRON SATN MFR SERPL: 42 % (ref 15–46)
IRON SERPL-MCNC: 117 UG/DL (ref 61–157)
MAGNESIUM SERPL-MCNC: 1.8 MG/DL (ref 1.7–2.3)
MCH RBC QN AUTO: 32.1 PG (ref 26.5–33)
MCHC RBC AUTO-ENTMCNC: 33.5 G/DL (ref 31.5–36.5)
MCV RBC AUTO: 96 FL (ref 78–100)
NT-PROBNP SERPL-MCNC: 264 PG/ML (ref 0–900)
PLATELET # BLD AUTO: 223 10E3/UL (ref 150–450)
POTASSIUM SERPL-SCNC: 3.7 MMOL/L (ref 3.4–5.3)
PROT SERPL-MCNC: 7.6 G/DL (ref 6.4–8.3)
RBC # BLD AUTO: 3.89 10E6/UL (ref 4.4–5.9)
SODIUM SERPL-SCNC: 138 MMOL/L (ref 136–145)
TROPONIN T SERPL HS-MCNC: 16 NG/L
TSH SERPL DL<=0.005 MIU/L-ACNC: 1.06 UIU/ML (ref 0.3–4.2)
WBC # BLD AUTO: 7.5 10E3/UL (ref 4–11)

## 2022-10-31 PROCEDURE — 93005 ELECTROCARDIOGRAM TRACING: CPT | Performed by: FAMILY MEDICINE

## 2022-10-31 PROCEDURE — 82040 ASSAY OF SERUM ALBUMIN: CPT | Mod: ZL | Performed by: FAMILY MEDICINE

## 2022-10-31 PROCEDURE — 83880 ASSAY OF NATRIURETIC PEPTIDE: CPT | Mod: ZL | Performed by: FAMILY MEDICINE

## 2022-10-31 PROCEDURE — 84443 ASSAY THYROID STIM HORMONE: CPT | Mod: ZL | Performed by: FAMILY MEDICINE

## 2022-10-31 PROCEDURE — 36415 COLL VENOUS BLD VENIPUNCTURE: CPT | Mod: ZL | Performed by: FAMILY MEDICINE

## 2022-10-31 PROCEDURE — 99215 OFFICE O/P EST HI 40 MIN: CPT | Performed by: FAMILY MEDICINE

## 2022-10-31 PROCEDURE — 83550 IRON BINDING TEST: CPT | Mod: ZL | Performed by: FAMILY MEDICINE

## 2022-10-31 PROCEDURE — 82607 VITAMIN B-12: CPT | Mod: ZL | Performed by: FAMILY MEDICINE

## 2022-10-31 PROCEDURE — 85027 COMPLETE CBC AUTOMATED: CPT | Mod: ZL | Performed by: FAMILY MEDICINE

## 2022-10-31 PROCEDURE — 82746 ASSAY OF FOLIC ACID SERUM: CPT | Mod: ZL | Performed by: FAMILY MEDICINE

## 2022-10-31 PROCEDURE — 82728 ASSAY OF FERRITIN: CPT | Mod: ZL | Performed by: FAMILY MEDICINE

## 2022-10-31 PROCEDURE — 80053 COMPREHEN METABOLIC PANEL: CPT | Mod: ZL | Performed by: FAMILY MEDICINE

## 2022-10-31 PROCEDURE — 83735 ASSAY OF MAGNESIUM: CPT | Mod: ZL | Performed by: FAMILY MEDICINE

## 2022-10-31 PROCEDURE — G0463 HOSPITAL OUTPT CLINIC VISIT: HCPCS | Mod: 25

## 2022-10-31 PROCEDURE — 71046 X-RAY EXAM CHEST 2 VIEWS: CPT | Mod: TC

## 2022-10-31 PROCEDURE — 93010 ELECTROCARDIOGRAM REPORT: CPT | Performed by: INTERNAL MEDICINE

## 2022-10-31 PROCEDURE — 84484 ASSAY OF TROPONIN QUANT: CPT | Mod: ZL | Performed by: FAMILY MEDICINE

## 2022-10-31 PROCEDURE — G0463 HOSPITAL OUTPT CLINIC VISIT: HCPCS

## 2022-10-31 ASSESSMENT — ANXIETY QUESTIONNAIRES
1. FEELING NERVOUS, ANXIOUS, OR ON EDGE: SEVERAL DAYS
GAD7 TOTAL SCORE: 5
5. BEING SO RESTLESS THAT IT IS HARD TO SIT STILL: NOT AT ALL
IF YOU CHECKED OFF ANY PROBLEMS ON THIS QUESTIONNAIRE, HOW DIFFICULT HAVE THESE PROBLEMS MADE IT FOR YOU TO DO YOUR WORK, TAKE CARE OF THINGS AT HOME, OR GET ALONG WITH OTHER PEOPLE: SOMEWHAT DIFFICULT
3. WORRYING TOO MUCH ABOUT DIFFERENT THINGS: SEVERAL DAYS
7. FEELING AFRAID AS IF SOMETHING AWFUL MIGHT HAPPEN: SEVERAL DAYS
GAD7 TOTAL SCORE: 5
6. BECOMING EASILY ANNOYED OR IRRITABLE: SEVERAL DAYS
2. NOT BEING ABLE TO STOP OR CONTROL WORRYING: SEVERAL DAYS

## 2022-10-31 ASSESSMENT — PAIN SCALES - GENERAL: PAINLEVEL: MODERATE PAIN (5)

## 2022-10-31 ASSESSMENT — PATIENT HEALTH QUESTIONNAIRE - PHQ9: 5. POOR APPETITE OR OVEREATING: NOT AT ALL

## 2022-10-31 NOTE — PROGRESS NOTES
Assessment & Plan     Atypical chest pain  ekg stable, cxr stable, troponin negative  Similar episode worked up in June 22, will proceed with stress testing, he is concerned about this given an angiogram 40-50 yrs ago  May need pulmonary work up as well although he works out almost daily and doesn't denote any change in pain for that  - Echocardiogram Dobutamine Stress; Future    RBBB  stable    Essential hypertension, benign    - TSH with free T4 reflex    Coronary artery disease involving native coronary artery of native heart without angina pectoris      Regular alcohol consumption  Labs wnl  - TSH with free T4 reflex  - Magnesium  - Folate  - Vitamin B12; Future  - Vitamin B12    Other chest pain  Discussed, and no pain on muscular pressure on exam in his chest and costochondral joints  - TSH with free T4 reflex  - Magnesium  - Troponin T, High Sensitivity  - Folate  - Vitamin B12; Future  - EKG 12-lead complete w/read - (Clinic Performed)  - XR CHEST 2 VW (Clinic Performed)  - CBC with platelets  - Comprehensive metabolic panel  - BNP-N terminal pro  - Vitamin B12    Unspecified abnormalities of breathing  Normal for age  - BNP-N terminal pro    Anemia of unknown etiology  Last colonoscopy was 7/2021 and wnl, denies any bleeding anywhere, may need chest CT if nothing noted on stress echo  - Iron and iron binding capacity  - Ferritin    42 minutes spent on the date of the encounter doing chart review, history and exam, documentation and further activities per the note       Patient was agreeable to this plan and had no further questions.  There are no Patient Instructions on file for this visit.    No follow-ups on file.    Meche Dougherty MD  [facility] - ABDIELJESSICA Akers is a 71 year old, presenting for the following health issues:  Breathing Problem      HPI     Pulmonary emphysema Follow-Up    Overall, how are your COPD symptoms since your last clinic visit?  Much worse, feels  "like lungs or chest is pulsating, throbbing chest pain    How much fatigue or shortness of breath do you have when you are walking?  Same as usual    How much shortness of breath do you have when you are resting?  More than usual    How often do you cough? Sometimes    Have you noticed any change in your sputum/phlegm?  No    Have you experienced a recent fever? No    Please describe how far you can walk without stopping to rest:  Less than 1 block    How many flights of stairs are you able to walk up without stopping?  1    Have you had any Emergency Room Visits, Urgent Care Visits, or Hospital Admissions because of your COPD since your last office visit?  No    History   Smoking Status     Former     Packs/day: 0.10     Years: 2.00     Types: Cigarettes   Smokeless Tobacco     Former     No results found for: FEV1, PPC3HRG      How many servings of fruits and vegetables do you eat daily?  0-1    On average, how many sweetened beverages do you drink each day (Examples: soda, juice, sweet tea, etc.  Do NOT count diet or artificially sweetened beverages)?   1    How many days per week do you exercise enough to make your heart beat faster? 7    How many minutes a day do you exercise enough to make your heart beat faster? 30 - 60    How many days per week do you miss taking your medication? 0       Review of Systems   Constitutional, HEENT, cardiovascular, pulmonary, GI, , musculoskeletal, neuro, skin, endocrine and psych systems are negative, except as otherwise noted.      Objective    BP (!) 152/70 (BP Location: Left arm)   Pulse 52   Temp 97.2  F (36.2  C)   Ht 1.664 m (5' 5.5\")   Wt 87.9 kg (193 lb 12.8 oz)   SpO2 97%   BMI 31.76 kg/m    Body mass index is 31.76 kg/m .  Physical Exam   GENERAL: healthy, alert and no distress  NECK: no adenopathy, no asymmetry, masses, or scars and thyroid normal to palpation  RESP: lungs clear to auscultation - no rales, rhonchi or wheezes  CV: regular rate and rhythm, " normal S1 S2, no S3 or S4, no murmur, click or rub, no peripheral edema and peripheral pulses strong  ABDOMEN: soft, nontender, no hepatosplenomegaly, no masses and bowel sounds normal  MS: no gross musculoskeletal defects noted, no edema  PSYCH: mentation appears normal, affect normal/bright    Results for orders placed or performed in visit on 10/31/22   XR CHEST 2 VW (Clinic Performed)     Status: None    Narrative    PROCEDURE: XR CHEST 2 VIEWS 10/31/2022 2:36 PM    HISTORY: Other chest pain    COMPARISONS: 6/10/2022.    TECHNIQUE: 2 views.    FINDINGS: Heart size is stable. There is ectasia of the aorta, stable.  No acute infiltrate or effusion is seen.    There is a right shoulder orthoplasty. Degenerative change is seen in  the spine.         Impression    IMPRESSION: Degenerative change without acute abnormality.    GROVER DOUGLAS MD         SYSTEM ID:  [id number]   TSH with free T4 reflex     Status: Normal   Result Value Ref Range    TSH 1.06 0.30 - 4.20 uIU/mL   Magnesium     Status: Normal   Result Value Ref Range    Magnesium 1.8 1.7 - 2.3 mg/dL   Troponin T, High Sensitivity     Status: Normal   Result Value Ref Range    Troponin T, High Sensitivity 16 <=22 ng/L   CBC with platelets     Status: Abnormal   Result Value Ref Range    WBC Count 7.5 4.0 - 11.0 10e3/uL    RBC Count 3.89 (L) 4.40 - 5.90 10e6/uL    Hemoglobin 12.5 (L) 13.3 - 17.7 g/dL    Hematocrit 37.3 (L) 40.0 - 53.0 %    MCV 96 78 - 100 fL    MCH 32.1 26.5 - 33.0 pg    MCHC 33.5 31.5 - 36.5 g/dL    RDW 12.7 10.0 - 15.0 %    Platelet Count 223 150 - 450 10e3/uL   Comprehensive metabolic panel     Status: Normal   Result Value Ref Range    Sodium 138 136 - 145 mmol/L    Potassium 3.7 3.4 - 5.3 mmol/L    Chloride 104 98 - 107 mmol/L    Carbon Dioxide (CO2) 24 22 - 29 mmol/L    Anion Gap 10 7 - 15 mmol/L    Urea Nitrogen 20.7 8.0 - 23.0 mg/dL    Creatinine 1.07 0.67 - 1.17 mg/dL    Calcium 9.6 8.8 - 10.2 mg/dL    Glucose 90 70 - 99 mg/dL     Alkaline Phosphatase 94 40 - 129 U/L    AST 33 10 - 50 U/L    ALT 30 10 - 50 U/L    Protein Total 7.6 6.4 - 8.3 g/dL    Albumin 4.3 3.5 - 5.2 g/dL    Bilirubin Total 1.0 <=1.2 mg/dL    GFR Estimate 73 >60 mL/min/1.73m2   BNP-N terminal pro     Status: Normal   Result Value Ref Range    N Terminal Pro BNP Outpatient 264 0 - 900 pg/mL   Iron and iron binding capacity     Status: Normal   Result Value Ref Range    Iron 117 61 - 157 ug/dL    Iron Sat Index 42 15 - 46 %    Iron Binding Capacity 280 240 - 430 ug/dL   Ferritin     Status: Normal   Result Value Ref Range    Ferritin 320 31 - 409 ng/mL

## 2022-10-31 NOTE — NURSING NOTE
"Chief Complaint   Patient presents with     Breathing Problem       Initial BP (!) 152/70 (BP Location: Left arm)   Pulse 52   Temp 97.2  F (36.2  C)   Ht 1.664 m (5' 5.5\")   Wt 87.9 kg (193 lb 12.8 oz)   SpO2 97%   BMI 31.76 kg/m   Estimated body mass index is 31.76 kg/m  as calculated from the following:    Height as of this encounter: 1.664 m (5' 5.5\").    Weight as of this encounter: 87.9 kg (193 lb 12.8 oz).  Medication Reconciliation: complete  Jannteh Encsio LPN  "

## 2022-11-01 DIAGNOSIS — D64.9 ANEMIA, UNSPECIFIED TYPE: Primary | ICD-10-CM

## 2022-11-01 LAB
FOLATE SERPL-MCNC: 24.3 NG/ML (ref 4.6–34.8)
VIT B12 SERPL-MCNC: 779 PG/ML (ref 232–1245)

## 2022-11-09 ENCOUNTER — HOSPITAL ENCOUNTER (OUTPATIENT)
Dept: NUCLEAR MEDICINE | Facility: HOSPITAL | Age: 72
Setting detail: NUCLEAR MEDICINE
Discharge: HOME | End: 2022-11-09
Attending: FAMILY MEDICINE
Payer: COMMERCIAL

## 2022-11-09 ENCOUNTER — HOSPITAL ENCOUNTER (OUTPATIENT)
Dept: CARDIOLOGY | Facility: HOSPITAL | Age: 72
Setting detail: NUCLEAR MEDICINE
Discharge: HOME | End: 2022-11-09
Attending: FAMILY MEDICINE
Payer: COMMERCIAL

## 2022-11-09 DIAGNOSIS — Z98.890 HISTORY OF LEFT HEART CATHETERIZATION: ICD-10-CM

## 2022-11-09 DIAGNOSIS — I10 ESSENTIAL HYPERTENSION, BENIGN: ICD-10-CM

## 2022-11-09 DIAGNOSIS — I45.10 RIGHT BUNDLE BRANCH BLOCK: ICD-10-CM

## 2022-11-09 DIAGNOSIS — R07.89 ATYPICAL CHEST PAIN: ICD-10-CM

## 2022-11-09 DIAGNOSIS — I25.10 CORONARY ARTERY DISEASE INVOLVING NATIVE CORONARY ARTERY OF NATIVE HEART WITHOUT ANGINA PECTORIS: ICD-10-CM

## 2022-11-09 PROCEDURE — 78452 HT MUSCLE IMAGE SPECT MULT: CPT

## 2022-11-09 PROCEDURE — A9500 TC99M SESTAMIBI: HCPCS | Performed by: RADIOLOGY

## 2022-11-09 PROCEDURE — 93018 CV STRESS TEST I&R ONLY: CPT | Performed by: INTERNAL MEDICINE

## 2022-11-09 PROCEDURE — 343N000001 HC RX 343: Performed by: RADIOLOGY

## 2022-11-09 PROCEDURE — 93017 CV STRESS TEST TRACING ONLY: CPT

## 2022-11-09 PROCEDURE — 93016 CV STRESS TEST SUPVJ ONLY: CPT | Performed by: INTERNAL MEDICINE

## 2022-11-09 PROCEDURE — 250N000011 HC RX IP 250 OP 636: Performed by: INTERNAL MEDICINE

## 2022-11-09 RX ORDER — AMINOPHYLLINE 25 MG/ML
INJECTION, SOLUTION INTRAVENOUS
Status: DISCONTINUED
Start: 2022-11-09 | End: 2022-11-09 | Stop reason: WASHOUT

## 2022-11-09 RX ORDER — REGADENOSON 0.08 MG/ML
0.4 INJECTION, SOLUTION INTRAVENOUS ONCE
Status: COMPLETED | OUTPATIENT
Start: 2022-11-09 | End: 2022-11-09

## 2022-11-09 RX ADMIN — REGADENOSON 0.4 MG: 0.08 INJECTION, SOLUTION INTRAVENOUS at 08:45

## 2022-11-09 RX ADMIN — Medication 8.4 MILLICURIE: at 07:11

## 2022-11-09 RX ADMIN — Medication 27.1 MILLICURIE: at 08:45

## 2022-11-10 LAB
CV BLOOD PRESSURE: 68 MMHG
CV STRESS MAX HR HE: 73
NUC STRESS EJECTION FRACTION: 66 %
RATE PRESSURE PRODUCT: NORMAL
STRESS ECHO BASELINE DIASTOLIC HE: 70
STRESS ECHO BASELINE HR: 56 BPM
STRESS ECHO BASELINE SYSTOLIC BP: 170
STRESS ECHO CALCULATED PERCENT HR: 50 %
STRESS ECHO LAST STRESS DIASTOLIC BP: 62
STRESS ECHO LAST STRESS SYSTOLIC BP: 150
STRESS ECHO TARGET HR: 147

## 2022-11-16 ENCOUNTER — LAB (OUTPATIENT)
Dept: LAB | Facility: OTHER | Age: 72
End: 2022-11-16
Payer: COMMERCIAL

## 2022-11-16 DIAGNOSIS — D64.9 ANEMIA, UNSPECIFIED TYPE: ICD-10-CM

## 2022-11-16 LAB
BASOPHILS # BLD AUTO: 0 10E3/UL (ref 0–0.2)
BASOPHILS NFR BLD AUTO: 0 %
EOSINOPHIL # BLD AUTO: 0 10E3/UL (ref 0–0.7)
EOSINOPHIL NFR BLD AUTO: 1 %
ERYTHROCYTE [DISTWIDTH] IN BLOOD BY AUTOMATED COUNT: 12.8 % (ref 10–15)
HCT VFR BLD AUTO: 40.1 % (ref 40–53)
HGB BLD-MCNC: 13.6 G/DL (ref 13.3–17.7)
IMM GRANULOCYTES # BLD: 0 10E3/UL
IMM GRANULOCYTES NFR BLD: 0 %
LYMPHOCYTES # BLD AUTO: 1.3 10E3/UL (ref 0.8–5.3)
LYMPHOCYTES NFR BLD AUTO: 18 %
MCH RBC QN AUTO: 32.6 PG (ref 26.5–33)
MCHC RBC AUTO-ENTMCNC: 33.9 G/DL (ref 31.5–36.5)
MCV RBC AUTO: 96 FL (ref 78–100)
MONOCYTES # BLD AUTO: 0.6 10E3/UL (ref 0–1.3)
MONOCYTES NFR BLD AUTO: 9 %
NEUTROPHILS # BLD AUTO: 5 10E3/UL (ref 1.6–8.3)
NEUTROPHILS NFR BLD AUTO: 72 %
NRBC # BLD AUTO: 0 10E3/UL
NRBC BLD AUTO-RTO: 0 /100
PLATELET # BLD AUTO: 242 10E3/UL (ref 150–450)
RBC # BLD AUTO: 4.17 10E6/UL (ref 4.4–5.9)
WBC # BLD AUTO: 7 10E3/UL (ref 4–11)

## 2022-11-16 PROCEDURE — 85025 COMPLETE CBC W/AUTO DIFF WBC: CPT | Mod: ZL

## 2022-11-16 PROCEDURE — 36415 COLL VENOUS BLD VENIPUNCTURE: CPT | Mod: ZL

## 2022-11-22 ENCOUNTER — OFFICE VISIT (OUTPATIENT)
Dept: FAMILY MEDICINE | Facility: OTHER | Age: 72
End: 2022-11-22
Attending: FAMILY MEDICINE
Payer: COMMERCIAL

## 2022-11-22 VITALS
WEIGHT: 197 LBS | OXYGEN SATURATION: 98 % | DIASTOLIC BLOOD PRESSURE: 72 MMHG | HEART RATE: 54 BPM | BODY MASS INDEX: 32.28 KG/M2 | SYSTOLIC BLOOD PRESSURE: 170 MMHG | TEMPERATURE: 97.8 F | RESPIRATION RATE: 18 BRPM

## 2022-11-22 DIAGNOSIS — I10 ESSENTIAL HYPERTENSION, BENIGN: ICD-10-CM

## 2022-11-22 DIAGNOSIS — R07.89 ATYPICAL CHEST PAIN: Primary | ICD-10-CM

## 2022-11-22 PROCEDURE — G0463 HOSPITAL OUTPT CLINIC VISIT: HCPCS

## 2022-11-22 PROCEDURE — 99214 OFFICE O/P EST MOD 30 MIN: CPT | Performed by: FAMILY MEDICINE

## 2022-11-22 RX ORDER — OMEPRAZOLE 40 MG/1
40 CAPSULE, DELAYED RELEASE ORAL DAILY
Qty: 30 CAPSULE | Refills: 0 | Status: SHIPPED | OUTPATIENT
Start: 2022-11-22 | End: 2024-05-07

## 2022-11-22 ASSESSMENT — PAIN SCALES - GENERAL: PAINLEVEL: MODERATE PAIN (5)

## 2022-11-22 NOTE — PROGRESS NOTES
Assessment & Plan     Atypical chest pain  He had a normal Lexiscan MPI done on 11/9/2022.  His chest pain is very much atypical occurring only at rest and not provokable.  I question if he might be having some reflux causing his discomfort.  We will start him on omeprazole 40 mg daily to see if this changes anything.  Certainly we can stop this in a few weeks if no benefit.  - omeprazole (PRILOSEC) 40 MG DR capsule; Take 1 capsule (40 mg) by mouth daily    Essential hypertension, benign  He is going to start taking his medication regularly every day and also at the correct dosing.  I have asked him to get a home blood pressure monitor and to start checking his blood pressure twice daily and recording it on a piece of paper.  He should follow-up with his primary care provider Dr. Dougherty in 2 to 3 weeks with both his blood pressure log and his machine so we can check to make sure it is accurate.    Follow-up sooner if needed.    Time spent on day of service: 30 minutes including face-to-face time, chart review of recent testing and labs, most recent PCP note, and documentation time.    JESSICA ANDERSON, DO  [facility] - [place]    Subjective   Oswald is a 71 year old, presenting for the following health issues:  Hypertension      HPI     Timothy is a 71-year-old male patient of Dr. Dougherty.  He states his blood pressure was elevated recently when he had a stress test done for his heart, his stress test was normal.  He states his blood pressure has been high lately as well when he has checked it a few times at the pharmacy.  He is unable to tell me when he checked his blood pressure last, although it sounds like it was at least a few weeks ago.  He is unable to tell me any actual blood pressure readings.  When I asked him to give us he throws out the number 180.    His blood pressure today is 170/72, fairly consistent with recheck.  He has not yet taken any of his medications today.  Going through his  medication list, we discovered that he is only taking amlodipine 5 mg instead of the prescribed amlodipine 7.5 mg.    He is asking about his ongoing chest discomfort.  He states it is a mild discomfort when at rest that occurs randomly.  It is never exertional with being active shoveling snow etc.  He is not able to produce it with extremity movement, breathing, or palpation.  He has been taking some acetaminophen as needed which seems to help.  He denies any feeling of acid reflux or heartburn, and he is not on and has not tried any acid reflux medications.      Hyperlipidemia Follow-Up      Are you regularly taking any medication or supplement to lower your cholesterol?   Yes- Simvastatin    Are you having muscle aches or other side effects that you think could be caused by your cholesterol lowering medication?  No, unsure    Hypertension Follow-up      Do you check your blood pressure regularly outside of the clinic? No     Are you following a low salt diet? Yes    Are your blood pressures ever more than 140 on the top number (systolic) OR more   than 90 on the bottom number (diastolic), for example 140/90? Yes      Review of Systems   Constitutional: Negative for fever.   Respiratory: Negative for cough and shortness of breath.    Gastrointestinal: Negative for abdominal pain.            Objective    BP (!) 170/72   Pulse 54   Temp 97.8  F (36.6  C) (Tympanic)   Resp 18   Wt 89.4 kg (197 lb)   SpO2 98%   BMI 32.28 kg/m    Body mass index is 32.28 kg/m .  Physical Exam  Constitutional:       General: He is not in acute distress.     Appearance: Normal appearance.   Neck:      Vascular: No carotid bruit.   Cardiovascular:      Rate and Rhythm: Normal rate and regular rhythm.      Heart sounds: Normal heart sounds. No murmur heard.  Pulmonary:      Effort: Pulmonary effort is normal.      Breath sounds: Normal breath sounds. No wheezing.   Musculoskeletal:         General: No tenderness or deformity.       Right lower leg: No edema.      Left lower leg: No edema.   Lymphadenopathy:      Cervical: No cervical adenopathy.   Neurological:      Mental Status: He is alert and oriented to person, place, and time.

## 2022-11-28 ASSESSMENT — ENCOUNTER SYMPTOMS
COUGH: 0
ABDOMINAL PAIN: 0
FEVER: 0
SHORTNESS OF BREATH: 0

## 2022-12-12 ENCOUNTER — OFFICE VISIT (OUTPATIENT)
Dept: FAMILY MEDICINE | Facility: OTHER | Age: 72
End: 2022-12-12
Attending: FAMILY MEDICINE
Payer: COMMERCIAL

## 2022-12-12 VITALS
OXYGEN SATURATION: 99 % | TEMPERATURE: 97 F | WEIGHT: 196.2 LBS | BODY MASS INDEX: 31.53 KG/M2 | DIASTOLIC BLOOD PRESSURE: 64 MMHG | HEART RATE: 53 BPM | HEIGHT: 66 IN | SYSTOLIC BLOOD PRESSURE: 170 MMHG

## 2022-12-12 DIAGNOSIS — I10 ESSENTIAL HYPERTENSION, BENIGN: ICD-10-CM

## 2022-12-12 DIAGNOSIS — F41.1 GAD (GENERALIZED ANXIETY DISORDER): Primary | ICD-10-CM

## 2022-12-12 PROCEDURE — G0463 HOSPITAL OUTPT CLINIC VISIT: HCPCS

## 2022-12-12 PROCEDURE — 99214 OFFICE O/P EST MOD 30 MIN: CPT | Performed by: FAMILY MEDICINE

## 2022-12-12 RX ORDER — CITALOPRAM HYDROBROMIDE 10 MG/1
10 TABLET ORAL DAILY
Qty: 30 TABLET | Refills: 1 | Status: SHIPPED | OUTPATIENT
Start: 2022-12-12 | End: 2023-02-08 | Stop reason: DRUGHIGH

## 2022-12-12 ASSESSMENT — PATIENT HEALTH QUESTIONNAIRE - PHQ9: SUM OF ALL RESPONSES TO PHQ QUESTIONS 1-9: 4

## 2022-12-12 ASSESSMENT — PAIN SCALES - GENERAL: PAINLEVEL: MILD PAIN (2)

## 2022-12-12 NOTE — PROGRESS NOTES
"  Assessment & Plan     SILVIA (generalized anxiety disorder)  Start daily and follow-up 6-7 wks  This time of year he gets to worrying a bit more about the house, furnace, etc  - citalopram (CELEXA) 10 MG tablet; Take 1 tablet (10 mg) by mouth daily    Essential hypertension, benign  Elevated at home as well, will check next appt as I suspect his anxiety plays a big role  May increase lisinopril to 20 bid if not controlled      Provider  Link to [facility] Help Grid :[id number]}     BMI:   Estimated body mass index is 32.15 kg/m  as calculated from the following:    Height as of this encounter: 1.664 m (5' 5.5\").    Weight as of this encounter: 89 kg (196 lb 3.2 oz).   Weight management plan: Discussed healthy diet and exercise guidelines    Patient was agreeable to this plan and had no further questions.  There are no Patient Instructions on file for this visit.    No follow-ups on file.    Meche Dougherty MD  [facility] - VINNY Akers is a 72 year old, presenting for the following health issues:  Hypertension and COPD      HPI     Hypertension Follow-up      Do you check your blood pressure regularly outside of the clinic? Yes     Are you following a low salt diet? No    Are your blood pressures ever more than 140 on the top number (systolic) OR more   than 90 on the bottom number (diastolic), for example 140/90? Yes  Pt states that he feels very tense.       COPD Follow-Up    Overall, how are your COPD symptoms since your last clinic visit?  No change    How much fatigue or shortness of breath do you have when you are walking?  Same as usual    How much shortness of breath do you have when you are resting?  Same as usual    How often do you cough? Rarely    Have you noticed any change in your sputum/phlegm?  No    Have you experienced a recent fever? No    Please describe how far you can walk without stopping to rest:  1-2 miles    How many flights of stairs are you able to walk up without " stopping?  2    Have you had any Emergency Room Visits, Urgent Care Visits, or Hospital Admissions because of your COPD since your last office visit?  No    History   Smoking Status     Former     Packs/day: 0.10     Years: 2.00     Types: Cigarettes   Smokeless Tobacco     Former     No results found for: FEV1, BPG1KPH      How many servings of fruits and vegetables do you eat daily?  0-1    On average, how many sweetened beverages do you drink each day (Examples: soda, juice, sweet tea, etc.  Do NOT count diet or artificially sweetened beverages)?   1    How many days per week do you exercise enough to make your heart beat faster? 7    How many minutes a day do you exercise enough to make your heart beat faster? 30 - 60    How many days per week do you miss taking your medication? 0    Abnormal Mood Symptoms  Onset/Duration: few weeks-months  Description:   Depression (if yes, do PHQ-9): YES  Anxiety (if yes, do SILVIA-7): YES  Accompanying Signs & Symptoms:  Still participating in activities that you used to enjoy: YES  Fatigue: No  Irritability: YES  Difficulty concentrating: YES  Changes in appetite: YES  Problems with sleep: YES  Heart racing/beating fast: YES  Abnormally elevated, expansive, or irritable mood: No  Persistently increased activity or energy: No  Thoughts of hurting yourself or others: No  History:  Recent stress or major life event: YES  Prior depression or anxiety: yes  Family history of depression or anxiety: YES  Alcohol/drug use: No  Difficulty sleeping: YES  Precipitating or alleviating factors: mirtazapine in the past but he doesn't remember if it was effective  Therapies tried and outcome: none  PHQ 3/23/2022 6/10/2022 12/12/2022   PHQ-9 Total Score 3 3 4   Q9: Thoughts of better off dead/self-harm past 2 weeks Not at all Not at all Not at all     SILVIA-7 SCORE 9/14/2021 3/23/2022 10/31/2022   Total Score 0 2 5       Review of Systems   Constitutional, HEENT, cardiovascular, pulmonary, gi and  "gu systems are negative, except as otherwise noted.      Objective    BP (!) 170/64   Pulse 53   Temp 97  F (36.1  C)   Ht 1.664 m (5' 5.5\")   Wt 89 kg (196 lb 3.2 oz)   SpO2 99%   BMI 32.15 kg/m    Body mass index is 32.15 kg/m .  Physical Exam   GENERAL: healthy, alert and no distress  NECK: no adenopathy, no asymmetry, masses, or scars and thyroid normal to palpation  RESP: lungs clear to auscultation - no rales, rhonchi or wheezes  CV: regular rate and rhythm, normal S1 S2, no S3 or S4, no murmur, click or rub, no peripheral edema and peripheral pulses strong  MS: no gross musculoskeletal defects noted, no edema  PSYCH: mentation appears normal, affect normal/bright    No results found for any visits on 12/12/22.              "

## 2023-01-02 DIAGNOSIS — I10 ESSENTIAL HYPERTENSION, BENIGN: ICD-10-CM

## 2023-01-04 RX ORDER — AMLODIPINE BESYLATE 5 MG/1
TABLET ORAL
Qty: 135 TABLET | Refills: 1 | Status: SHIPPED | OUTPATIENT
Start: 2023-01-04 | End: 2023-02-08 | Stop reason: DRUGHIGH

## 2023-01-04 NOTE — TELEPHONE ENCOUNTER
Amlodipine      Last Written Prescription Date:  04/20/2022  Last Fill Quantity: 135,   # refills: 2  Last Office Visit: 12/12/2022

## 2023-02-01 NOTE — PROGRESS NOTES
Assessment & Plan     SILVIA (generalized anxiety disorder)  Increase to 20 mg and follow-up 2 mos  - citalopram (CELEXA) 20 MG tablet; Take 1 tablet (20 mg) by mouth daily    Dysthymic disorder  As above  - citalopram (CELEXA) 20 MG tablet; Take 1 tablet (20 mg) by mouth daily    Anemia of unknown etiology  Resolved, will recheck iron next visit, he is having no fatigue    Essential hypertension, benign  Had been trying to take 1.5 tablets but difficult second to size of the pill, increase to 10mg for better control  Follow-up 2 mos  - amLODIPine (NORVASC) 10 MG tablet; Take 1 tablet (10 mg) by mouth daily  - Comprehensive metabolic panel; Future    Mixed hyperlipidemia  Change to 20 mg tablet  - simvastatin (ZOCOR) 20 MG tablet; Take 1 tablet (20 mg) by mouth At Bedtime    Iron deficiency  Labs next visit  - Iron and iron binding capacity; Future  - Ferritin; Future    Hypovitaminosis D  Labs next visit    Vitamin B12 deficiency (non anemic)  Labs next visit  - Vitamin B12; Future    Provider  Link to [facility] Help Grid :[id number]}     Patient was agreeable to this plan and had no further questions.  There are no Patient Instructions on file for this visit.    No follow-ups on file.    Meche Dougherty MD  [facility] - VINNY Akers is a 72 year old, presenting for the following health issues:  Recheck Medication      HPI     Continuing to have chest discomfort- states it is throbbing. Gets worse after eating.    Hyperlipidemia Follow-Up      Are you regularly taking any medication or supplement to lower your cholesterol?   Yes- Simvastatin    Are you having muscle aches or other side effects that you think could be caused by your cholesterol lowering medication?  No    Hypertension Follow-up      Do you check your blood pressure regularly outside of the clinic? Yes     Are you following a low salt diet? Yes    Are your blood pressures ever more than 140 on the top number (systolic) OR  more   than 90 on the bottom number (diastolic), for example 140/90? Yes    Anxiety Follow-Up    How are you doing with your anxiety since your last visit? No change    Are you having other symptoms that might be associated with anxiety? Yes:  chest throbbing    Have you had a significant life event? No     Are you feeling depressed? Yes:  celexa has helped a little    Do you have any concerns with your use of alcohol or other drugs? No    Social History     Tobacco Use     Smoking status: Former     Packs/day: 0.10     Years: 2.00     Pack years: 0.20     Types: Cigarettes     Smokeless tobacco: Former     Tobacco comments:     no passive exposure, tried to quit-yes   Vaping Use     Vaping Use: Never used   Substance Use Topics     Alcohol use: Yes     Comment: daily      Drug use: No     SILVIA-7 SCORE 3/23/2022 10/31/2022 2/8/2023   Total Score 2 5 5     PHQ 3/23/2022 6/10/2022 12/12/2022   PHQ-9 Total Score 3 3 4   Q9: Thoughts of better off dead/self-harm past 2 weeks Not at all Not at all Not at all     Last PHQ-9 12/12/2022   1.  Little interest or pleasure in doing things 1   2.  Feeling down, depressed, or hopeless 1   3.  Trouble falling or staying asleep, or sleeping too much 1   4.  Feeling tired or having little energy 0   5.  Poor appetite or overeating 0   6.  Feeling bad about yourself 1   7.  Trouble concentrating 0   8.  Moving slowly or restless 0   Q9: Thoughts of better off dead/self-harm past 2 weeks 0   PHQ-9 Total Score 4   Difficulty at work, home, or with people Somewhat difficult     SILVIA-7  2/8/2023   1. Feeling nervous, anxious, or on edge 1   2. Not being able to stop or control worrying 1   3. Worrying too much about different things 1   4. Trouble relaxing 0   5. Being so restless that it is hard to sit still 0   6. Becoming easily annoyed or irritable 1   7. Feeling afraid, as if something awful might happen 1   SILVIA-7 Total Score 5   If you checked any problems, how difficult have they  "made it for you to do your work, take care of things at home, or get along with other people? Not difficult at all         How many servings of fruits and vegetables do you eat daily?  0-1    On average, how many sweetened beverages do you drink each day (Examples: soda, juice, sweet tea, etc.  Do NOT count diet or artificially sweetened beverages)?   1    How many days per week do you exercise enough to make your heart beat faster? 7    How many minutes a day do you exercise enough to make your heart beat faster? 30 - 60    How many days per week do you miss taking your medication? 0    Review of Systems   Constitutional, HEENT, cardiovascular, pulmonary, gi and gu systems are negative, except as otherwise noted.      Objective    BP (!) 152/62   Pulse 58   Temp 97.3  F (36.3  C)   Ht 1.664 m (5' 5.5\")   Wt 87.8 kg (193 lb 8 oz)   SpO2 98%   BMI 31.71 kg/m    Body mass index is 31.71 kg/m .  Physical Exam   GENERAL: healthy, alert and no distress  NECK: no adenopathy, no asymmetry, masses, or scars and thyroid normal to palpation  RESP: lungs clear to auscultation - no rales, rhonchi or wheezes  CV: regular rate and rhythm, normal S1 S2, no S3 or S4, no murmur, click or rub, no peripheral edema and peripheral pulses strong  ABDOMEN: soft, nontender, no hepatosplenomegaly, no masses and bowel sounds normal  MS: no gross musculoskeletal defects noted, no edema  PSYCH: mentation appears normal, affect normal/bright    No results found for any visits on 02/08/23.                "

## 2023-02-08 ENCOUNTER — OFFICE VISIT (OUTPATIENT)
Dept: FAMILY MEDICINE | Facility: OTHER | Age: 73
End: 2023-02-08
Attending: FAMILY MEDICINE
Payer: COMMERCIAL

## 2023-02-08 VITALS
SYSTOLIC BLOOD PRESSURE: 152 MMHG | WEIGHT: 193.5 LBS | TEMPERATURE: 97.3 F | HEART RATE: 58 BPM | DIASTOLIC BLOOD PRESSURE: 62 MMHG | BODY MASS INDEX: 31.1 KG/M2 | HEIGHT: 66 IN | OXYGEN SATURATION: 98 %

## 2023-02-08 DIAGNOSIS — E53.8 VITAMIN B12 DEFICIENCY (NON ANEMIC): ICD-10-CM

## 2023-02-08 DIAGNOSIS — E78.2 MIXED HYPERLIPIDEMIA: ICD-10-CM

## 2023-02-08 DIAGNOSIS — I10 ESSENTIAL HYPERTENSION, BENIGN: ICD-10-CM

## 2023-02-08 DIAGNOSIS — F41.1 GAD (GENERALIZED ANXIETY DISORDER): Primary | ICD-10-CM

## 2023-02-08 DIAGNOSIS — E55.9 HYPOVITAMINOSIS D: ICD-10-CM

## 2023-02-08 DIAGNOSIS — E61.1 IRON DEFICIENCY: ICD-10-CM

## 2023-02-08 DIAGNOSIS — F34.1 DYSTHYMIC DISORDER: ICD-10-CM

## 2023-02-08 DIAGNOSIS — D64.9 ANEMIA OF UNKNOWN ETIOLOGY: ICD-10-CM

## 2023-02-08 PROCEDURE — G0463 HOSPITAL OUTPT CLINIC VISIT: HCPCS

## 2023-02-08 PROCEDURE — 99214 OFFICE O/P EST MOD 30 MIN: CPT | Performed by: FAMILY MEDICINE

## 2023-02-08 RX ORDER — CITALOPRAM HYDROBROMIDE 20 MG/1
20 TABLET ORAL DAILY
Qty: 90 TABLET | Refills: 1 | Status: SHIPPED | OUTPATIENT
Start: 2023-02-08 | End: 2023-08-08

## 2023-02-08 RX ORDER — SIMVASTATIN 20 MG
20 TABLET ORAL AT BEDTIME
Qty: 90 TABLET | Refills: 1 | Status: SHIPPED | OUTPATIENT
Start: 2023-02-08 | End: 2023-08-24

## 2023-02-08 RX ORDER — AMLODIPINE BESYLATE 10 MG/1
10 TABLET ORAL DAILY
Qty: 90 TABLET | Refills: 1 | Status: SHIPPED | OUTPATIENT
Start: 2023-02-08 | End: 2023-11-29

## 2023-02-08 ASSESSMENT — ANXIETY QUESTIONNAIRES
2. NOT BEING ABLE TO STOP OR CONTROL WORRYING: SEVERAL DAYS
IF YOU CHECKED OFF ANY PROBLEMS ON THIS QUESTIONNAIRE, HOW DIFFICULT HAVE THESE PROBLEMS MADE IT FOR YOU TO DO YOUR WORK, TAKE CARE OF THINGS AT HOME, OR GET ALONG WITH OTHER PEOPLE: NOT DIFFICULT AT ALL
GAD7 TOTAL SCORE: 5
1. FEELING NERVOUS, ANXIOUS, OR ON EDGE: SEVERAL DAYS
4. TROUBLE RELAXING: NOT AT ALL
6. BECOMING EASILY ANNOYED OR IRRITABLE: SEVERAL DAYS
GAD7 TOTAL SCORE: 5
7. FEELING AFRAID AS IF SOMETHING AWFUL MIGHT HAPPEN: SEVERAL DAYS
5. BEING SO RESTLESS THAT IT IS HARD TO SIT STILL: NOT AT ALL
3. WORRYING TOO MUCH ABOUT DIFFERENT THINGS: SEVERAL DAYS

## 2023-02-08 ASSESSMENT — PAIN SCALES - GENERAL: PAINLEVEL: MODERATE PAIN (5)

## 2023-02-09 DIAGNOSIS — F41.1 GAD (GENERALIZED ANXIETY DISORDER): ICD-10-CM

## 2023-02-13 RX ORDER — CITALOPRAM HYDROBROMIDE 10 MG/1
TABLET ORAL
Qty: 30 TABLET | Refills: 0 | OUTPATIENT
Start: 2023-02-13

## 2023-03-07 DIAGNOSIS — J43.9 PULMONARY EMPHYSEMA, UNSPECIFIED EMPHYSEMA TYPE (H): ICD-10-CM

## 2023-03-07 RX ORDER — MONTELUKAST SODIUM 10 MG/1
TABLET ORAL
Qty: 90 TABLET | Refills: 0 | Status: SHIPPED | OUTPATIENT
Start: 2023-03-07 | End: 2023-06-16

## 2023-04-06 NOTE — PROGRESS NOTES
Assessment & Plan       (I10) Essential hypertension, benign  (primary encounter diagnosis)  Comment: Consistently elevated with current regemen. Will add diuretic with home monitoring and follow up in 2 months.   Plan: chlorthalidone (HYGROTON) 25 MG tablet        Check your blood pressure at home in the morning before caffeine, rest for 5 minutes, keep arm at level of heart. Write down and bring to follow up appointment. Chlorthalidone can cause dizziness, use caution with position changes. Discussed red flag symptoms requiring urgent evaluation. Try to cut back on alcohol intake. Follow low sodium DASH diet, exercise, weight loss to help manage BP.     (Z23) Need for diphtheria-tetanus-pertussis (Tdap) vaccine  Comment: Cut self in left 2nd digit with metal object last month. Last vaccine in 2010.   Plan: TDAP VACCINE (Adacel, Boostrix)  [[id number]]  (W45.0XXA) Puncture wound of skin from metal nail    (F34.1) Dysthymic disorder  Comment: Mood stable.  Plan: Continue celexa as prescribed. Discussed activity, sleep hydiene, diet, and contributors of mood as well as red flag symptoms.    (R07.89) Atypical chest pain  Comment: Stress test in 2022 with normal EF. Symptoms stable/unchanged for patient.    Plan: Discussed red flag symptoms.    (J43.9) Pulmonary emphysema, unspecified emphysema type (H)  Comment:   Plan: doing well with singulair, if no improvement in BP, will ask about etoh intake and consider repeat PFTs      Return in 2 months for BP check.      Maddy Disla, NP student    No follow-ups on file.    Meche Dougherty MD  [facility] - [facility]   Oswald is a 72 year old, presenting for the following health issues:  Recheck Medication  Patient has no concerns today. He is taking his medications regularly, misses appx. 1 dose per month, exercising regularly, recently changed to decaff coffee, and eats a low salt diet. Over the Easter weekend he did have more salt consumption.  His weight is relatively stable ranging from 195-205 over the past few years.    He reports his baseline chronic chest pressure with no changes, points to his upper chest/neck area. This was worked up in 2022 with echo and stress test, saw cardiology. He has no heartburn. No shortness of breath, no chest pain or palpitations. No cough or acute illness. Pain comes and goes. Not associated with activity. He dabbled in cigarette smoking as a young adult for two years. No history of allergies. No indoor wood furnace. Describes this his symptoms as unchanged.     His mood is stable and he is tolerating the celexa well.          View : No data to display.              HPI     Hyperlipidemia Follow-Up      Are you regularly taking any medication or supplement to lower your cholesterol?   Yes- Simvastatin    Are you having muscle aches or other side effects that you think could be caused by your cholesterol lowering medication?  No    Hypertension Follow-up      Do you check your blood pressure regularly outside of the clinic? Yes     Are you following a low salt diet? Yes    Are your blood pressures ever more than 140 on the top number (systolic) OR more   than 90 on the bottom number (diastolic), for example 140/90? Yes    Anxiety Follow-Up    How are you doing with your anxiety since your last visit? No change    Are you having other symptoms that might be associated with anxiety? No    Have you had a significant life event? No     Are you feeling depressed? No    Do you have any concerns with your use of alcohol or other drugs? Yes:  alcohol use most days    Social History     Tobacco Use     Smoking status: Former     Packs/day: 0.10     Years: 2.00     Pack years: 0.20     Types: Cigarettes     Smokeless tobacco: Former     Tobacco comments:     no passive exposure, tried to quit-yes   Vaping Use     Vaping status: Never Used   Substance Use Topics     Alcohol use: Yes     Comment: daily      Drug use: No          10/31/2022     1:00 PM 2/8/2023    10:00 AM 4/10/2023     9:53 AM   SILVIA-7 SCORE   Total Score   1 (minimal anxiety)   Total Score 5 5 1         6/10/2022    10:00 AM 12/12/2022     1:30 PM 4/10/2023     9:51 AM   PHQ   PHQ-9 Total Score 3 4 1   Q9: Thoughts of better off dead/self-harm past 2 weeks Not at all Not at all Not at all         4/10/2023     9:51 AM   Last PHQ-9   1.  Little interest or pleasure in doing things 0   2.  Feeling down, depressed, or hopeless 0   3.  Trouble falling or staying asleep, or sleeping too much 1   4.  Feeling tired or having little energy 0   5.  Poor appetite or overeating 0   6.  Feeling bad about yourself 0   7.  Trouble concentrating 0   8.  Moving slowly or restless 0   Q9: Thoughts of better off dead/self-harm past 2 weeks 0   PHQ-9 Total Score 1         4/10/2023     9:53 AM   SILVIA-7    1. Feeling nervous, anxious, or on edge 1   2. Not being able to stop or control worrying 0   3. Worrying too much about different things 0   4. Trouble relaxing 0   5. Being so restless that it is hard to sit still 0   6. Becoming easily annoyed or irritable 0   7. Feeling afraid, as if something awful might happen 0   SILVIA-7 Total Score 1   If you checked any problems, how difficult have they made it for you to do your work, take care of things at home, or get along with other people? Not difficult at all         How many servings of fruits and vegetables do you eat daily?  0-1    On average, how many sweetened beverages do you drink each day (Examples: soda, juice, sweet tea, etc.  Do NOT count diet or artificially sweetened beverages)?   1    How many days per week do you exercise enough to make your heart beat faster? 7    How many minutes a day do you exercise enough to make your heart beat faster? 30 - 60    How many days per week do you miss taking your medication? 0         Review of Systems   Constitutional, HEENT, cardiovascular, pulmonary, gi and gu systems are negative, except as  "otherwise noted.      Objective    BP (!) 156/70 (BP Location: Right arm, Patient Position: Chair, Cuff Size: Adult Regular)   Pulse 58   Temp 97  F (36.1  C) (Tympanic)   Resp 16   Ht 1.676 m (5' 6\")   Wt 93.1 kg (205 lb 4.8 oz)   SpO2 95%   BMI 33.14 kg/m    Body mass index is 33.14 kg/m .  Physical Exam   GENERAL: healthy, alert and no distress  RESP: lungs clear to auscultation - no rales, rhonchi or wheezes  CV: regular rate and rhythm, normal S1 S2, no S3 or S4, no murmur, click or rub, no peripheral edema, no carotid bruit  ABDOMEN: rounded, soft, nontender, no hepatosplenomegaly bowel sounds normal  MS: no gross musculoskeletal defects noted, no edema    Results for orders placed or performed in visit on 04/10/23 (from the past 24 hour(s))   Comprehensive metabolic panel   Result Value Ref Range    Sodium 141 136 - 145 mmol/L    Potassium 4.6 3.4 - 5.3 mmol/L    Chloride 107 98 - 107 mmol/L    Carbon Dioxide (CO2) 25 22 - 29 mmol/L    Anion Gap 9 7 - 15 mmol/L    Urea Nitrogen 22.8 8.0 - 23.0 mg/dL    Creatinine 1.04 0.67 - 1.17 mg/dL    Calcium 9.5 8.8 - 10.2 mg/dL    Glucose 106 (H) 70 - 99 mg/dL    Alkaline Phosphatase 109 40 - 129 U/L    AST 45 10 - 50 U/L    ALT 37 10 - 50 U/L    Protein Total 7.4 6.4 - 8.3 g/dL    Albumin 4.1 3.5 - 5.2 g/dL    Bilirubin Total 0.7 <=1.2 mg/dL    GFR Estimate 75 >60 mL/min/1.73m2   Ferritin   Result Value Ref Range    Ferritin 277 31 - 409 ng/mL   Iron and iron binding capacity   Result Value Ref Range    Iron 85 61 - 157 ug/dL    Iron Binding Capacity 289 240 - 430 ug/dL    Iron Sat Index 29 15 - 46 %       Physician Attestation   I, Meche Dougherty MD, was present with the medical/BRADLEY student who participated in the service and in the documentation of the note.  I have verified the history and personally performed the physical exam and medical decision making.  I agree with the assessment and plan of care as documented in the note.      Items personally " reviewed: vitals and labs.    Meche Dougherty MD            Answers for HPI/ROS submitted by the patient on 4/10/2023  If you checked off any problems, how difficult have these problems made it for you to do your work, take care of things at home, or get along with other people?: Not difficult at all  PHQ9 TOTAL SCORE: 1  SILVIA 7 TOTAL SCORE: 1

## 2023-04-10 ENCOUNTER — OFFICE VISIT (OUTPATIENT)
Dept: FAMILY MEDICINE | Facility: OTHER | Age: 73
End: 2023-04-10
Attending: FAMILY MEDICINE
Payer: COMMERCIAL

## 2023-04-10 ENCOUNTER — LAB (OUTPATIENT)
Dept: LAB | Facility: OTHER | Age: 73
End: 2023-04-10
Payer: COMMERCIAL

## 2023-04-10 VITALS
HEART RATE: 58 BPM | DIASTOLIC BLOOD PRESSURE: 70 MMHG | TEMPERATURE: 97 F | OXYGEN SATURATION: 95 % | HEIGHT: 66 IN | BODY MASS INDEX: 32.99 KG/M2 | RESPIRATION RATE: 16 BRPM | WEIGHT: 205.3 LBS | SYSTOLIC BLOOD PRESSURE: 156 MMHG

## 2023-04-10 DIAGNOSIS — F34.1 DYSTHYMIC DISORDER: ICD-10-CM
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
## 2023-04-10 DIAGNOSIS — E53.8 VITAMIN B12 DEFICIENCY (NON ANEMIC): ICD-10-CM

## 2023-04-10 DIAGNOSIS — E61.1 IRON DEFICIENCY: ICD-10-CM

## 2023-04-10 DIAGNOSIS — I10 ESSENTIAL HYPERTENSION, BENIGN: ICD-10-CM

## 2023-04-10 DIAGNOSIS — Z23 NEED FOR DIPHTHERIA-TETANUS-PERTUSSIS (TDAP) VACCINE: ICD-10-CM

## 2023-04-10 DIAGNOSIS — I10 ESSENTIAL HYPERTENSION, BENIGN: Primary | ICD-10-CM

## 2023-04-10 DIAGNOSIS — R07.89 ATYPICAL CHEST PAIN: ICD-10-CM

## 2023-04-10 DIAGNOSIS — J43.9 PULMONARY EMPHYSEMA, UNSPECIFIED EMPHYSEMA TYPE (H): ICD-10-CM

## 2023-04-10 LAB
ALBUMIN SERPL BCG-MCNC: 4.1 G/DL (ref 3.5–5.2)
ALP SERPL-CCNC: 109 U/L (ref 40–129)
ALT SERPL W P-5'-P-CCNC: 37 U/L (ref 10–50)
ANION GAP SERPL CALCULATED.3IONS-SCNC: 9 MMOL/L (ref 7–15)
AST SERPL W P-5'-P-CCNC: 45 U/L (ref 10–50)
BILIRUB SERPL-MCNC: 0.7 MG/DL
BUN SERPL-MCNC: 22.8 MG/DL (ref 8–23)
CALCIUM SERPL-MCNC: 9.5 MG/DL (ref 8.8–10.2)
CHLORIDE SERPL-SCNC: 107 MMOL/L (ref 98–107)
CREAT SERPL-MCNC: 1.04 MG/DL (ref 0.67–1.17)
DEPRECATED HCO3 PLAS-SCNC: 25 MMOL/L (ref 22–29)
FERRITIN SERPL-MCNC: 277 NG/ML (ref 31–409)
GFR SERPL CREATININE-BSD FRML MDRD: 75 ML/MIN/1.73M2
GLUCOSE SERPL-MCNC: 106 MG/DL (ref 70–99)
IRON BINDING CAPACITY (ROCHE): 289 UG/DL (ref 240–430)
IRON SATN MFR SERPL: 29 % (ref 15–46)
IRON SERPL-MCNC: 85 UG/DL (ref 61–157)
POTASSIUM SERPL-SCNC: 4.6 MMOL/L (ref 3.4–5.3)
PROT SERPL-MCNC: 7.4 G/DL (ref 6.4–8.3)
SODIUM SERPL-SCNC: 141 MMOL/L (ref 136–145)
VIT B12 SERPL-MCNC: 735 PG/ML (ref 232–1245)

## 2023-04-10 PROCEDURE — 83550 IRON BINDING TEST: CPT | Mod: ZL

## 2023-04-10 PROCEDURE — 90471 IMMUNIZATION ADMIN: CPT

## 2023-04-10 PROCEDURE — 99214 OFFICE O/P EST MOD 30 MIN: CPT | Performed by: FAMILY MEDICINE

## 2023-04-10 PROCEDURE — G0463 HOSPITAL OUTPT CLINIC VISIT: HCPCS | Mod: 25

## 2023-04-10 PROCEDURE — G0463 HOSPITAL OUTPT CLINIC VISIT: HCPCS

## 2023-04-10 PROCEDURE — 82728 ASSAY OF FERRITIN: CPT | Mod: ZL

## 2023-04-10 PROCEDURE — 82607 VITAMIN B-12: CPT | Mod: ZL

## 2023-04-10 PROCEDURE — 36415 COLL VENOUS BLD VENIPUNCTURE: CPT | Mod: ZL

## 2023-04-10 PROCEDURE — 80053 COMPREHEN METABOLIC PANEL: CPT | Mod: ZL

## 2023-04-10 RX ORDER — CHLORTHALIDONE 25 MG/1
25 TABLET ORAL DAILY
Qty: 30 TABLET | Refills: 1 | Status: SHIPPED | OUTPATIENT
Start: 2023-04-10 | End: 2023-06-06

## 2023-04-10 SDOH — ECONOMIC STABILITY: INCOME INSECURITY: IN THE LAST 12 MONTHS, WAS THERE A TIME WHEN YOU WERE NOT ABLE TO PAY THE MORTGAGE OR RENT ON TIME?: NO

## 2023-04-10 SDOH — ECONOMIC STABILITY: TRANSPORTATION INSECURITY
IN THE PAST 12 MONTHS, HAS THE LACK OF TRANSPORTATION KEPT YOU FROM MEDICAL APPOINTMENTS OR FROM GETTING MEDICATIONS?: NO

## 2023-04-10 SDOH — ECONOMIC STABILITY: TRANSPORTATION INSECURITY
IN THE PAST 12 MONTHS, HAS LACK OF TRANSPORTATION KEPT YOU FROM MEETINGS, WORK, OR FROM GETTING THINGS NEEDED FOR DAILY LIVING?: NO

## 2023-04-10 SDOH — ECONOMIC STABILITY: FOOD INSECURITY: WITHIN THE PAST 12 MONTHS, YOU WORRIED THAT YOUR FOOD WOULD RUN OUT BEFORE YOU GOT MONEY TO BUY MORE.: NEVER TRUE

## 2023-04-10 SDOH — ECONOMIC STABILITY: FOOD INSECURITY: WITHIN THE PAST 12 MONTHS, THE FOOD YOU BOUGHT JUST DIDN'T LAST AND YOU DIDN'T HAVE MONEY TO GET MORE.: NEVER TRUE

## 2023-04-10 SDOH — HEALTH STABILITY: PHYSICAL HEALTH: ON AVERAGE, HOW MANY MINUTES DO YOU ENGAGE IN EXERCISE AT THIS LEVEL?: 50 MIN

## 2023-04-10 SDOH — HEALTH STABILITY: PHYSICAL HEALTH: ON AVERAGE, HOW MANY DAYS PER WEEK DO YOU ENGAGE IN MODERATE TO STRENUOUS EXERCISE (LIKE A BRISK WALK)?: 5 DAYS

## 2023-04-10 ASSESSMENT — ANXIETY QUESTIONNAIRES
IF YOU CHECKED OFF ANY PROBLEMS ON THIS QUESTIONNAIRE, HOW DIFFICULT HAVE THESE PROBLEMS MADE IT FOR YOU TO DO YOUR WORK, TAKE CARE OF THINGS AT HOME, OR GET ALONG WITH OTHER PEOPLE: NOT DIFFICULT AT ALL
7. FEELING AFRAID AS IF SOMETHING AWFUL MIGHT HAPPEN: NOT AT ALL
8. IF YOU CHECKED OFF ANY PROBLEMS, HOW DIFFICULT HAVE THESE MADE IT FOR YOU TO DO YOUR WORK, TAKE CARE OF THINGS AT HOME, OR GET ALONG WITH OTHER PEOPLE?: NOT DIFFICULT AT ALL
GAD7 TOTAL SCORE: 1
4. TROUBLE RELAXING: NOT AT ALL
GAD7 TOTAL SCORE: 1
5. BEING SO RESTLESS THAT IT IS HARD TO SIT STILL: NOT AT ALL
1. FEELING NERVOUS, ANXIOUS, OR ON EDGE: SEVERAL DAYS
GAD7 TOTAL SCORE: 1
2. NOT BEING ABLE TO STOP OR CONTROL WORRYING: NOT AT ALL
6. BECOMING EASILY ANNOYED OR IRRITABLE: NOT AT ALL
7. FEELING AFRAID AS IF SOMETHING AWFUL MIGHT HAPPEN: NOT AT ALL
3. WORRYING TOO MUCH ABOUT DIFFERENT THINGS: NOT AT ALL

## 2023-04-10 ASSESSMENT — PAIN SCALES - GENERAL: PAINLEVEL: NO PAIN (0)

## 2023-04-10 ASSESSMENT — PATIENT HEALTH QUESTIONNAIRE - PHQ9
SUM OF ALL RESPONSES TO PHQ QUESTIONS 1-9: 1
10. IF YOU CHECKED OFF ANY PROBLEMS, HOW DIFFICULT HAVE THESE PROBLEMS MADE IT FOR YOU TO DO YOUR WORK, TAKE CARE OF THINGS AT HOME, OR GET ALONG WITH OTHER PEOPLE: NOT DIFFICULT AT ALL
SUM OF ALL RESPONSES TO PHQ QUESTIONS 1-9: 1

## 2023-04-10 ASSESSMENT — SOCIAL DETERMINANTS OF HEALTH (SDOH): HOW HARD IS IT FOR YOU TO PAY FOR THE VERY BASICS LIKE FOOD, HOUSING, MEDICAL CARE, AND HEATING?: NOT HARD AT ALL

## 2023-05-04 DIAGNOSIS — G47.09 OTHER INSOMNIA: ICD-10-CM

## 2023-05-04 RX ORDER — TRAZODONE HYDROCHLORIDE 50 MG/1
TABLET, FILM COATED ORAL
Qty: 45 TABLET | Refills: 0 | Status: SHIPPED | OUTPATIENT
Start: 2023-05-04 | End: 2023-06-08

## 2023-05-04 NOTE — TELEPHONE ENCOUNTER
Trazodone (Desyrel) 50 MG tablet    Last Written Prescription Date:  06/10/2022  Last Fill Quantity: 45,   # refills: 0  Last Office Visit: 04/10/2023

## 2023-06-02 NOTE — PROGRESS NOTES
"  Assessment & Plan     Encounter for Medicare annual wellness exam  Follow-up 1 year    Other insomnia  We will trial some amitriptyline for both his neuropathy and his sleep he will let me know if this is not working  He had tried a half a tablet of trazodone which had some effect would encourage him to try 50 mg but now retrying amitriptyline instead  - amitriptyline (ELAVIL) 10 MG tablet; Take 1 tablet (10 mg) by mouth At Bedtime Take 10 hours before you want to be up in the morning    Other polyneuropathy  As above  - amitriptyline (ELAVIL) 10 MG tablet; Take 1 tablet (10 mg) by mouth At Bedtime Take 10 hours before you want to be up in the morning    Hypertriglyceridemia  He will come fasting another day  The 10-year ASCVD risk score (Cesia ISRAEL, et al., 2019) is: 19.8%    Values used to calculate the score:      Age: 72 years      Sex: Male      Is Non- : No      Diabetic: No      Tobacco smoker: No      Systolic Blood Pressure: 110 mmHg      Is BP treated: Yes      HDL Cholesterol: 53 mg/dL      Total Cholesterol: 175 mg/dL  Continue statin  - Lipid Profile (Chol, Trig, HDL, LDL calc); Future  - Comprehensive metabolic panel; Future    Hypovitaminosis D    - Vitamin D Deficiency; Future    Vitamin B12 deficiency (non anemic)    - Vitamin B12; Future    Provider  Link to [facility] Help Grid :[id number]}     BMI:   Estimated body mass index is 32.07 kg/m  as calculated from the following:    Height as of this encounter: 1.676 m (5' 6\").    Weight as of this encounter: 90.1 kg (198 lb 11.2 oz).   Weight management plan: Discussed healthy diet and exercise guidelines    Please call patient with the following results:  There are no Patient Instructions on file for this visit.    No follow-ups on file.    Meche Dougherty MD  [facility] - [facility] Akers is a 72 year old, presenting for the following health issues:  Hypertension and Musculoskeletal Problem (Lump on right " "wrist)      Healthy Habits:    In general, how would you rate your overall health?  Good    Frequency of exercise:  6-7 days/week    Duration of exercise:  Greater than 60 minutes    Do you usually eat at least 4 servings of fruit and vegetables a day, include whole grains    & fiber and avoid regularly eating high fat or \"junk\" foods?  No    Taking medications regularly:  Yes    Medication side effects:  None    Ability to successfully perform activities of daily living:  No assistance needed    Home Safety:  Lack of grab bars in the bathroom    Hearing Impairment:  Difficulty following a conversation in a noisy restaurant or crowded room, difficulty following dialogue in the theater and difficulty understanding soft or whispered speech    In the past 6 months, have you been bothered by leaking of urine?  No    In general, how would you rate your overall mental or emotional health?  Good      PHQ-2 Total Score:    Additional concerns today:  No       Pt states he has tingling in his feet  Lump on right wrist- painful    Hyperlipidemia Follow-Up      Are you regularly taking any medication or supplement to lower your cholesterol?   Yes- Simvastatin    Are you having muscle aches or other side effects that you think could be caused by your cholesterol lowering medication?  No    Hypertension Follow-up      Do you check your blood pressure regularly outside of the clinic? Yes     Are you following a low salt diet? Yes    Are your blood pressures ever more than 140 on the top number (systolic) OR more   than 90 on the bottom number (diastolic), for example 140/90? Yes a few      How many servings of fruits and vegetables do you eat daily?  0-1    On average, how many sweetened beverages do you drink each day (Examples: soda, juice, sweet tea, etc.  Do NOT count diet or artificially sweetened beverages)?   1    How many days per week do you exercise enough to make your heart beat faster? 7    How many minutes a day do " "you exercise enough to make your heart beat faster? 30 - 60    How many days per week do you miss taking your medication? 0    Joint or Musculoskeletal Pain  Duration of complaint: ongoing   Description:   Location: right wrist  Character: throbbing  Intensity: moderate  Progression of Symptoms: worse  Accompanying Signs & Symptoms: Other symptoms: none  History: Previous similar pain: YES- had a lump previous, but has come back and is larger    Precipitating factors: Trauma or overuse: no  Alleviating factors: Improved by: nothing  Therapies Tried and outcome: none       Review of Systems   Constitutional: Negative for chills and fever.   HENT: Negative for congestion, ear pain, hearing loss and sore throat.    Eyes: Negative for pain and visual disturbance.   Respiratory: Positive for shortness of breath. Negative for cough.    Cardiovascular: Negative for chest pain, palpitations and peripheral edema.   Gastrointestinal: Negative for abdominal pain, constipation, diarrhea, hematochezia and nausea.   Genitourinary: Positive for frequency. Negative for dysuria, genital sores, hematuria, impotence, penile discharge and urgency.   Musculoskeletal: Positive for arthralgias. Negative for joint swelling and myalgias.   Skin: Negative for rash.   Neurological: Negative for dizziness, weakness, headaches and paresthesias.   Psychiatric/Behavioral: Negative for mood changes. The patient is nervous/anxious.       Constitutional, HEENT, cardiovascular, pulmonary, gi and gu systems are negative, except as otherwise noted.      Objective    /70 (BP Location: Left arm, Patient Position: Sitting, Cuff Size: Adult Regular)   Pulse 62   Temp 97  F (36.1  C) (Tympanic)   Ht 1.676 m (5' 6\")   Wt 90.1 kg (198 lb 11.2 oz)   SpO2 97%   BMI 32.07 kg/m    Body mass index is 32.07 kg/m .  Physical Exam   GENERAL: healthy, alert and no distress  RESP: lungs clear to auscultation - no rales, rhonchi or wheezes  CV: regular rate " and rhythm, normal S1 S2, no S3 or S4, no murmur, click or rub, no peripheral edema and peripheral pulses strong  MS: tenderness to palpation right thenar eminance, right wrist approx 3cm in diameter somewhat limited but soft tissue mass  PSYCH: mentation appears normal, affect normal/bright    No results found for any visits on 06/08/23.

## 2023-06-08 ENCOUNTER — OFFICE VISIT (OUTPATIENT)
Dept: FAMILY MEDICINE | Facility: OTHER | Age: 73
End: 2023-06-08
Attending: FAMILY MEDICINE
Payer: COMMERCIAL

## 2023-06-08 VITALS
OXYGEN SATURATION: 97 % | BODY MASS INDEX: 31.93 KG/M2 | HEART RATE: 62 BPM | WEIGHT: 198.7 LBS | DIASTOLIC BLOOD PRESSURE: 70 MMHG | TEMPERATURE: 97 F | SYSTOLIC BLOOD PRESSURE: 110 MMHG | HEIGHT: 66 IN

## 2023-06-08 DIAGNOSIS — E53.8 VITAMIN B12 DEFICIENCY (NON ANEMIC): ICD-10-CM

## 2023-06-08 DIAGNOSIS — E78.1 HYPERTRIGLYCERIDEMIA: ICD-10-CM

## 2023-06-08 DIAGNOSIS — I10 ESSENTIAL HYPERTENSION, BENIGN: ICD-10-CM

## 2023-06-08 DIAGNOSIS — J43.9 PULMONARY EMPHYSEMA, UNSPECIFIED EMPHYSEMA TYPE (H): ICD-10-CM

## 2023-06-08 DIAGNOSIS — G62.89 OTHER POLYNEUROPATHY: ICD-10-CM

## 2023-06-08 DIAGNOSIS — Z12.5 SCREENING FOR PROSTATE CANCER: ICD-10-CM

## 2023-06-08 DIAGNOSIS — E55.9 HYPOVITAMINOSIS D: ICD-10-CM

## 2023-06-08 DIAGNOSIS — G47.09 OTHER INSOMNIA: ICD-10-CM

## 2023-06-08 DIAGNOSIS — Z00.00 ENCOUNTER FOR MEDICARE ANNUAL WELLNESS EXAM: Primary | ICD-10-CM

## 2023-06-08 PROCEDURE — G0463 HOSPITAL OUTPT CLINIC VISIT: HCPCS

## 2023-06-08 PROCEDURE — G0439 PPPS, SUBSEQ VISIT: HCPCS | Performed by: FAMILY MEDICINE

## 2023-06-08 RX ORDER — AMITRIPTYLINE HYDROCHLORIDE 10 MG/1
10 TABLET ORAL AT BEDTIME
Qty: 30 TABLET | Refills: 1 | Status: SHIPPED | OUTPATIENT
Start: 2023-06-08 | End: 2023-08-02

## 2023-06-08 ASSESSMENT — ENCOUNTER SYMPTOMS
SORE THROAT: 0
EYE PAIN: 0
PALPITATIONS: 0
DYSURIA: 0
CONSTIPATION: 0
DIARRHEA: 0
MYALGIAS: 0
PARESTHESIAS: 0
NAUSEA: 0
JOINT SWELLING: 0
SHORTNESS OF BREATH: 1
ABDOMINAL PAIN: 0
NERVOUS/ANXIOUS: 1
HEMATURIA: 0
WEAKNESS: 0
FREQUENCY: 1
HEMATOCHEZIA: 0
DIZZINESS: 0
FEVER: 0
HEADACHES: 0
COUGH: 0
CHILLS: 0
ARTHRALGIAS: 1

## 2023-06-08 ASSESSMENT — ACTIVITIES OF DAILY LIVING (ADL): CURRENT_FUNCTION: NO ASSISTANCE NEEDED

## 2023-06-08 ASSESSMENT — PAIN SCALES - GENERAL: PAINLEVEL: MILD PAIN (3)

## 2023-06-08 NOTE — PATIENT INSTRUCTIONS
Patient Education   Personalized Prevention Plan  You are due for the preventive services outlined below.  Your care team is available to assist you in scheduling these services.  If you have already completed any of these items, please share that information with your care team to update in your medical record.  Health Maintenance Due   Topic Date Due     COPD Action Plan  Never done     COVID-19 Vaccine (6 - Pfizer series) 02/20/2023     Cholesterol Lab  03/30/2023

## 2023-06-08 NOTE — PROGRESS NOTES
"  SUBJECTIVE:   Oswald is a 72 year old who presents for Preventive Visit.      6/8/2023    10:34 AM   Additional Questions   Roomed by Janneth   Accompanied by Self     Are you in the first 12 months of your Medicare coverage?  No    Healthy Habits:    In general, how would you rate your overall health?  Good    Frequency of exercise:  6-7 days/week    Duration of exercise:  Greater than 60 minutes    Do you usually eat at least 4 servings of fruit and vegetables a day, include whole grains    & fiber and avoid regularly eating high fat or \"junk\" foods?  No    Taking medications regularly:  Yes    Medication side effects:  None    Ability to successfully perform activities of daily living:  No assistance needed    Home Safety:  Lack of grab bars in the bathroom    Hearing Impairment:  Difficulty following a conversation in a noisy restaurant or crowded room, difficulty following dialogue in the theater and difficulty understanding soft or whispered speech    In the past 6 months, have you been bothered by leaking of urine?  No    In general, how would you rate your overall mental or emotional health?  Good      PHQ-2 Total Score:    Additional concerns today:  No        Have you ever done Advance Care Planning? (For example, a Health Directive, POLST, or a discussion with a medical provider or your loved ones about your wishes): Yes, patient states has an Advance Care Planning document and will bring a copy to the clinic.       Fall risk  Fallen 2 or more times in the past year?: No  Any fall with injury in the past year?: No    Cognitive Screening   1) Repeat 3 items (Leader, Season, Table)    2) Clock draw: NORMAL  3) 3 item recall: Recalls 2 objects   Results: NORMAL clock, 1-2 items recalled: COGNITIVE IMPAIRMENT LESS LIKELY    Mini-CogTM Copyright TERRY Arce. Licensed by the author for use in [facility]; reprinted with permission (jackie@.[email or web address]). All rights reserved.      Do you have sleep apnea, " excessive snoring or daytime drowsiness?: no    Reviewed and updated as needed this visit by clinical staff   Tobacco  Allergies  Meds   Med Hx            Reviewed and updated as needed this visit by Provider       Med Hx           Social History     Tobacco Use     Smoking status: Former     Packs/day: 0.10     Years: 2.00     Pack years: 0.20     Types: Cigarettes     Quit date: 1968     Years since quittin.4     Smokeless tobacco: Former     Tobacco comments:     no passive exposure, tried to quit-yes   Vaping Use     Vaping status: Never Used   Substance Use Topics     Alcohol use: Yes     Comment: daily              6/10/2022    10:08 AM   Alcohol Use   Prescreen: >3 drinks/day or >7 drinks/week? Yes   AUDIT SCORE  5     Do you have a current opioid prescription? No  Do you use any other controlled substances or medications that are not prescribed by a provider? None    Hyperlipidemia Follow-Up      Are you regularly taking any medication or supplement to lower your cholesterol?   Yes- Simvastatin    Are you having muscle aches or other side effects that you think could be caused by your cholesterol lowering medication?  No    Hypertension Follow-up      Do you check your blood pressure regularly outside of the clinic? Yes     Are you following a low salt diet? Yes    Are your blood pressures ever more than 140 on the top number (systolic) OR more   than 90 on the bottom number (diastolic), for example 140/90? Yes a few    Current providers sharing in care for this patient include: Patient Care Team:  Meche Dougherty MD as PCP - General (Family Medicine)  Meche Dougherty MD as Assigned PCP    The following health maintenance items are reviewed in Epic and correct as of today:  Health Maintenance   Topic Date Due     COPD ACTION PLAN  Never done     COVID-19 Vaccine (6 - Pfizer series) 2023     LIPID  2023     SILVIA ASSESSMENT  10/10/2023     PHQ-9  10/10/2023     FALL RISK ASSESSMENT   10/31/2023     MEDICARE ANNUAL WELLNESS VISIT  06/08/2024     ADVANCE CARE PLANNING  06/10/2027     COLORECTAL CANCER SCREENING  07/29/2031     DTAP/TDAP/TD IMMUNIZATION (3 - Td or Tdap) 04/10/2033     SPIROMETRY  Completed     HEPATITIS C SCREENING  Completed     DEPRESSION ACTION PLAN  Completed     INFLUENZA VACCINE  Completed     Pneumococcal Vaccine: 65+ Years  Completed     ZOSTER IMMUNIZATION  Completed     AORTIC ANEURYSM SCREENING (SYSTEM ASSIGNED)  Completed     IPV IMMUNIZATION  Aged Out     MENINGITIS IMMUNIZATION  Aged Out     Lab work is in process  Labs reviewed in EPIC  BP Readings from Last 3 Encounters:   06/08/23 110/70   04/10/23 (!) 156/70   02/08/23 (!) 152/62    Wt Readings from Last 3 Encounters:   06/08/23 90.1 kg (198 lb 11.2 oz)   04/10/23 93.1 kg (205 lb 4.8 oz)   02/08/23 87.8 kg (193 lb 8 oz)                  Patient Active Problem List   Diagnosis     Advanced care planning/counseling discussion     Mixed hyperlipidemia     Hypertrophy of prostate without urinary obstruction     Nonallopathic lesion of cervical region     Dysthymic disorder     Essential hypertension, benign     RBBB     Primary localized osteoarthrosis, pelvic region and thigh     ACP (advance care planning)     Colon cancer screening     Tingling     History of tobacco abuse     TENORIO (dyspnea on exertion)     History of left heart catheterization 40 to 50 years ago at the [facility]     Coronary artery disease involving native coronary artery of native heart without angina pectoris     History of COVID-19 on 11/20/2020     Regular alcohol consumption     Hypertriglyceridemia     Screen for colon cancer     * * * SBE PROPHYLAXIS * * *     Other insomnia     Pure hypercholesterolemia     Hypovitaminosis D     Iron deficiency     Non-traumatic rhabdomyolysis     Paresthesias     Pulmonary emphysema, unspecified emphysema type (H)     Bilateral lower extremity pain     Screening for prostate cancer     Atypical chest pain      Anemia of unknown etiology     SILVIA (generalized anxiety disorder)     Vitamin B12 deficiency (non anemic)     Puncture wound of skin from metal nail     Other polyneuropathy     Past Surgical History:   Procedure Laterality Date     COLONOSCOPY  2011    repeat 10 yrs     COLONOSCOPY  2005     COLONOSCOPY N/A 2021    Procedure: Colonoscopy;  Surgeon: Eh Villa MD;  Location: [facility] OR     HERNIA REPAIR N/A     unknown     left arthroscopy Left     knee -- Dr Love     RELEASE CARPAL TUNNEL Bilateral     Dr Gonsalez     right shoulder replacement Right     Dr Trinh     right total hip replacement N/A     Dr Hurtado     TONSILLECTOMY       TRANSPOSITION ULNAR NERVE (ELBOW) Left 2015    Procedure: TRANSPOSITION ULNAR NERVE (ELBOW);  Surgeon: Mahesh Capps MD;  Location: [facility] OR       Social History     Tobacco Use     Smoking status: Former     Packs/day: 0.10     Years: 2.00     Pack years: 0.20     Types: Cigarettes     Quit date:      Years since quittin.4     Smokeless tobacco: Former     Tobacco comments:     no passive exposure, tried to quit-yes   Vaping Use     Vaping status: Never Used   Substance Use Topics     Alcohol use: Yes     Comment: daily      Family History   Problem Relation Age of Onset     Heart Disease Mother         CABG     Pancreatic Cancer Mother 81     Other - See Comments Father 84     Family History Negative Sister      Family History Negative Sister      Unknown/Adopted Maternal Grandmother      Unknown/Adopted Maternal Grandfather      Unknown/Adopted Paternal Grandmother      Unknown/Adopted Paternal Grandfather          Current Outpatient Medications   Medication Sig Dispense Refill     acetaminophen (TYLENOL) 500 MG tablet Take 500-1,000 mg by mouth every 6 hours as needed for mild pain       amitriptyline (ELAVIL) 10 MG tablet Take 1 tablet (10 mg) by mouth At Bedtime Take 10 hours before you want to be up in the morning 30 tablet 1      amLODIPine (NORVASC) 10 MG tablet Take 1 tablet (10 mg) by mouth daily 90 tablet 1     aspirin (ASA) 81 MG chewable tablet Take 81 mg by mouth daily       chlorthalidone (HYGROTON) 25 MG tablet TAKE 1 TABLET BY MOUTH ONCE DAILY. 90 tablet 3     citalopram (CELEXA) 20 MG tablet Take 1 tablet (20 mg) by mouth daily 90 tablet 1     cyanocobalamin (VITAMIN B-12) 500 MCG SUBL sublingual tablet Place 500 mcg under the tongue daily       fish oil-omega-3 fatty acids 1000 MG capsule Take 1 g by mouth daily       lisinopril (ZESTRIL) 20 MG tablet TAKE 1 TABLET BY MOUTH ONCE DAILY. 90 tablet 1     Milk Thistle-Dand-Fennel-Licor (MILK THISTLE XTRA) CAPS capsule        montelukast (SINGULAIR) 10 MG tablet TAKE ONE TABLET BY MOUTH AT BEDTIME. 90 tablet 0     Multiple Vitamin (DAILY MULTIVITAMIN PO) Take 1 tablet by Oral route every day with food       omeprazole (PRILOSEC) 40 MG DR capsule Take 1 capsule (40 mg) by mouth daily 30 capsule 0     simvastatin (ZOCOR) 20 MG tablet Take 1 tablet (20 mg) by mouth At Bedtime 90 tablet 1     Turmeric 500 MG TABS        vitamin C (ASCORBIC ACID) 1000 MG TABS Take 1,000 mg by mouth daily       Vitamin D3 (CHOLECALCIFEROL) 25 mcg (1000 units) tablet Take 3 tablets by mouth daily       Allergies   Allergen Reactions     Animal Dander      Deer hair         Review of Systems   Constitutional: Negative for chills and fever.   HENT: Negative for congestion, ear pain, hearing loss and sore throat.    Eyes: Negative for pain and visual disturbance.   Respiratory: Positive for shortness of breath. Negative for cough.    Cardiovascular: Negative for chest pain, palpitations and peripheral edema.   Gastrointestinal: Negative for abdominal pain, constipation, diarrhea, hematochezia and nausea.   Genitourinary: Positive for frequency. Negative for dysuria, genital sores, hematuria, impotence, penile discharge and urgency.   Musculoskeletal: Positive for arthralgias. Negative for joint swelling and  "myalgias.   Skin: Negative for rash.   Neurological: Negative for dizziness, weakness, headaches and paresthesias.   Psychiatric/Behavioral: Negative for mood changes. The patient is nervous/anxious.      Constitutional, HEENT, cardiovascular, pulmonary, GI, , musculoskeletal, neuro, skin, endocrine and psych systems are negative, except as otherwise noted.    OBJECTIVE:   /70 (BP Location: Left arm, Patient Position: Sitting, Cuff Size: Adult Regular)   Pulse 62   Temp 97  F (36.1  C) (Tympanic)   Ht 1.676 m (5' 6\")   Wt 90.1 kg (198 lb 11.2 oz)   SpO2 97%   BMI 32.07 kg/m   Estimated body mass index is 32.07 kg/m  as calculated from the following:    Height as of this encounter: 1.676 m (5' 6\").    Weight as of this encounter: 90.1 kg (198 lb 11.2 oz).  Physical Exam  GENERAL: healthy, alert and no distress  EYES: Eyes grossly normal to inspection, PERRL and conjunctivae and sclerae normal  HENT: ear canals and TM's normal, nose and mouth without ulcers or lesions  NECK: no adenopathy, no asymmetry, masses, or scars and thyroid normal to palpation  RESP: lungs clear to auscultation - no rales, rhonchi or wheezes  CV: regular rate and rhythm, normal S1 S2, no S3 or S4, no murmur, click or rub, no peripheral edema and peripheral pulses strong  ABDOMEN: soft, nontender, no hepatosplenomegaly, no masses and bowel sounds normal  MS: no gross musculoskeletal defects noted, no edema  SKIN: no suspicious lesions or rashes  NEURO: Normal strength and tone, mentation intact and speech normal  PSYCH: mentation appears normal, affect normal/bright    Diagnostic Test Results:  Labs reviewed in Epic  No results found for any visits on 06/08/23.    ASSESSMENT / PLAN:   (Z00.00) Encounter for Medicare annual wellness exam  (primary encounter diagnosis)  Comment:   Plan: follow-up 1 year    (G47.09) Other insomnia  Comment:   Plan: amitriptyline (ELAVIL) 10 MG tablet        If amitriptyline does not work for him we " can go back to trazodone and try 50 mg instead of 25  If amitriptyline helps but is not enough effective will increase to 20 or 25 mg    (G62.89) Other polyneuropathy  Comment: He does have a history of some arthritis and scoliosis in his low back that could be where some of his symptoms are coming from  He declines any an EMG test  Plan: amitriptyline (ELAVIL) 10 MG tablet        rx sent    (E78.1) Hypertriglyceridemia  Comment:   Plan: Lipid Profile (Chol, Trig, HDL, LDL calc),         Comprehensive metabolic panel        We will come back for fasting labs    (E55.9) Hypovitaminosis D  Comment:   Plan: Vitamin D Deficiency            (E53.8) Vitamin B12 deficiency (non anemic)  Comment:   Plan: Vitamin B12          (Z12.5) Screening for prostate cancer  Comment:   Plan: PSA, screen  due    (I10) Essential hypertension, benign  Comment: Blood pressure is much better controlled  Plan: Continue medications    (J43.9) Pulmonary emphysema, unspecified emphysema type (H)  Comment:   Plan: COPD -- declines any other controller medications    COUNSELING:  Reviewed preventive health counseling, as reflected in patient instructions  Special attention given to:       Regular exercise       Healthy diet/nutrition       Vision screening       Hearing screening       Dental care       Prostate cancer screening        He reports that he quit smoking about 55 years ago. His smoking use included cigarettes. He has a 0.20 pack-year smoking history. He has quit using smokeless tobacco.      Appropriate preventive services were discussed with this patient, including applicable screening as appropriate for cardiovascular disease, diabetes, osteopenia/osteoporosis, and glaucoma.  As appropriate for age/gender, discussed screening for colorectal cancer, prostate cancer, breast cancer, and cervical cancer. Checklist reviewing preventive services available has been given to the patient.    Reviewed patients plan of care and provided an  AVS. The Intermediate Care Plan ( asthma action plan, low back pain action plan, and migraine action plan) for Oswald meets the Care Plan requirement. This Care Plan has been established and reviewed with the Patient.          Meche Dougherty MD  [facility]    Identified Health Risks:    I have reviewed Opioid Use Disorder and Substance Use Disorder risk factors and made any needed referrals.

## 2023-06-08 NOTE — LETTER
My COPD Action Plan     Name: Oswald Goel    YOB: 1950   Date: 6/8/2023    My doctor: Meche Dougherty MD   My clinic: [facility] [place]    [facility] [address] [facility]  [place] [address]  [phone]  My Controller Medicine:    Dose:      My Rescue Medicine: Albuterol (Proair/Ventolin/Proventil) inhaler   Dose:      My Flare Up Medicine:    Dose:      My COPD Severity: Mild = FeV1 > 80%      Use of Oxygen: Oxygen Not Prescribed      Make sure you've had your pneumonia   vaccines.          GREEN ZONE       Doing well today    Usual level of activity and exercise  Usual amount of cough and mucus  No shortness of breath  Usual level of health (thinking clearly, sleeping well, feel like eating) Actions:    Take daily medicines  Use oxygen as prescribed  Follow regular exercise and diet plan  Avoid cigarette smoke and other irritants that harm the lungs           YELLOW ZONE          Having a bad day or flare up    Short of breath more than usual  A lot more sputum (mucus) than usual  Sputum looks yellow, green, tan, brown or bloody  More coughing or wheezing  Fever or chills  Less energy; trouble completing activities  Trouble thinking or focusing  Using quick relief inhaler or nebulizer more often  Poor sleep; symptoms wake me up  Do not feel like eating Actions:    Get plenty of rest  Take daily medicines  Use quick relief inhaler every 4 hours  If you use oxygen, call you doctor to see if you should adjust your oxygen  Do breathing exercises or other things to help you relax  Let a loved one, friend or neighbor know you are feeling worse  Call your care team if you have 2 or more symptoms.  Start taking steroids or antibiotics if directed by your care team           RED ZONE       Need medical care now    Severe shortness of breath (feel you can't breathe)  Fever, chills  Not enough breath to do any activity  Trouble coughing up mucus, walking or talking  Blood in mucus  Frequent coughing  Rescue medicines are not working  Not able to sleep because of breathing  Feel confused or drowsy  Chest pain    Actions:    Call your health care team.  If you cannot reach your care team, call 911 or go to the emergency room.        Annual Reminders:  Meet with Care Team, Flu Shot every Fall  Pharmacy: [facility] [address]

## 2023-06-16 ENCOUNTER — LAB (OUTPATIENT)
Dept: LAB | Facility: OTHER | Age: 73
End: 2023-06-16
Payer: COMMERCIAL

## 2023-06-16 DIAGNOSIS — J43.9 PULMONARY EMPHYSEMA, UNSPECIFIED EMPHYSEMA TYPE (H): ICD-10-CM

## 2023-06-16 DIAGNOSIS — E55.9 HYPOVITAMINOSIS D: ICD-10-CM

## 2023-06-16 DIAGNOSIS — Z12.5 SCREENING FOR PROSTATE CANCER: ICD-10-CM

## 2023-06-16 DIAGNOSIS — E53.8 VITAMIN B12 DEFICIENCY (NON ANEMIC): ICD-10-CM

## 2023-06-16 DIAGNOSIS — R79.89 ELEVATED SERUM CREATININE: Primary | ICD-10-CM

## 2023-06-16 DIAGNOSIS — E78.1 HYPERTRIGLYCERIDEMIA: ICD-10-CM

## 2023-06-16 LAB
ALBUMIN SERPL BCG-MCNC: 4.4 G/DL (ref 3.5–5.2)
ALP SERPL-CCNC: 112 U/L (ref 40–129)
ALT SERPL W P-5'-P-CCNC: 32 U/L (ref 0–70)
ANION GAP SERPL CALCULATED.3IONS-SCNC: 7 MMOL/L (ref 7–15)
AST SERPL W P-5'-P-CCNC: 28 U/L (ref 0–45)
BILIRUB SERPL-MCNC: 0.6 MG/DL
BUN SERPL-MCNC: 39.1 MG/DL (ref 8–23)
CALCIUM SERPL-MCNC: 10.1 MG/DL (ref 8.8–10.2)
CHLORIDE SERPL-SCNC: 101 MMOL/L (ref 98–107)
CHOLEST SERPL-MCNC: 209 MG/DL
CREAT SERPL-MCNC: 1.42 MG/DL (ref 0.67–1.17)
DEPRECATED HCO3 PLAS-SCNC: 29 MMOL/L (ref 22–29)
GFR SERPL CREATININE-BSD FRML MDRD: 52 ML/MIN/1.73M2
GLUCOSE SERPL-MCNC: 105 MG/DL (ref 70–99)
HDLC SERPL-MCNC: 50 MG/DL
LDLC SERPL CALC-MCNC: 126 MG/DL
NONHDLC SERPL-MCNC: 159 MG/DL
POTASSIUM SERPL-SCNC: 4.6 MMOL/L (ref 3.4–5.3)
PROT SERPL-MCNC: 7.7 G/DL (ref 6.4–8.3)
PSA SERPL DL<=0.01 NG/ML-MCNC: 0.23 NG/ML (ref 0–6.5)
SODIUM SERPL-SCNC: 137 MMOL/L (ref 136–145)
TRIGL SERPL-MCNC: 166 MG/DL
VIT B12 SERPL-MCNC: 865 PG/ML (ref 232–1245)

## 2023-06-16 PROCEDURE — 80061 LIPID PANEL: CPT | Mod: ZL

## 2023-06-16 PROCEDURE — 82310 ASSAY OF CALCIUM: CPT | Mod: ZL

## 2023-06-16 PROCEDURE — G0103 PSA SCREENING: HCPCS | Mod: ZL

## 2023-06-16 PROCEDURE — 82607 VITAMIN B-12: CPT | Mod: ZL

## 2023-06-16 PROCEDURE — 82306 VITAMIN D 25 HYDROXY: CPT | Mod: ZL

## 2023-06-16 PROCEDURE — 36415 COLL VENOUS BLD VENIPUNCTURE: CPT | Mod: ZL

## 2023-06-16 RX ORDER — MONTELUKAST SODIUM 10 MG/1
TABLET ORAL
Qty: 90 TABLET | Refills: 3 | Status: SHIPPED | OUTPATIENT
Start: 2023-06-16 | End: 2024-06-14

## 2023-06-16 NOTE — TELEPHONE ENCOUNTER
Singulair       Last Written Prescription Date:  3/07/2023  Last Fill Quantity: 90,   # refills: 0  Last Office Visit: 06/08/2023  Future Office visit:    Next 5 appointments (look out 90 days)    Sep 13, 2023  9:45 AM  (Arrive by 9:30 AM)  SHORT with Meche Dougherty MD  [facility] [place] [facility] [place] [facility] [address]  [place] [address]  [phone]

## 2023-06-17 LAB — DEPRECATED CALCIDIOL+CALCIFEROL SERPL-MC: 39 UG/L (ref 20–75)

## 2023-06-19 ENCOUNTER — LAB (OUTPATIENT)
Dept: LAB | Facility: OTHER | Age: 73
End: 2023-06-19
Payer: COMMERCIAL

## 2023-06-19 DIAGNOSIS — R79.89 ELEVATED SERUM CREATININE: ICD-10-CM

## 2023-06-19 LAB
ANION GAP SERPL CALCULATED.3IONS-SCNC: 12 MMOL/L (ref 7–15)
BUN SERPL-MCNC: 32.8 MG/DL (ref 8–23)
CALCIUM SERPL-MCNC: 9.7 MG/DL (ref 8.8–10.2)
CHLORIDE SERPL-SCNC: 96 MMOL/L (ref 98–107)
CREAT SERPL-MCNC: 1.2 MG/DL (ref 0.67–1.17)
DEPRECATED HCO3 PLAS-SCNC: 26 MMOL/L (ref 22–29)
GFR SERPL CREATININE-BSD FRML MDRD: 63 ML/MIN/1.73M2
GLUCOSE SERPL-MCNC: 97 MG/DL (ref 70–99)
POTASSIUM SERPL-SCNC: 4 MMOL/L (ref 3.4–5.3)
SODIUM SERPL-SCNC: 134 MMOL/L (ref 136–145)

## 2023-06-19 PROCEDURE — 36415 COLL VENOUS BLD VENIPUNCTURE: CPT | Mod: ZL

## 2023-06-19 PROCEDURE — 80048 BASIC METABOLIC PNL TOTAL CA: CPT | Mod: ZL

## 2023-06-19 NOTE — RESULT ENCOUNTER NOTE
Please call and notify patient that his creatinine has improved now with hydration over the weekend.   I will also route to update PCP Dr Dougherty.

## 2023-07-31 DIAGNOSIS — G47.09 OTHER INSOMNIA: ICD-10-CM

## 2023-07-31 DIAGNOSIS — G62.89 OTHER POLYNEUROPATHY: ICD-10-CM

## 2023-08-02 RX ORDER — AMITRIPTYLINE HYDROCHLORIDE 10 MG/1
TABLET ORAL
Qty: 90 TABLET | Refills: 1 | Status: SHIPPED | OUTPATIENT
Start: 2023-08-02 | End: 2023-09-13 | Stop reason: DRUGHIGH

## 2023-08-02 NOTE — TELEPHONE ENCOUNTER
Amytriptyline (Elavil) 10 MG tablet    Last Written Prescription Date:  06/08/2023  Last Fill Quantity: 30,   # refills: 1  Last Office Visit: 06/08/2023

## 2023-08-04 DIAGNOSIS — F34.1 DYSTHYMIC DISORDER: ICD-10-CM

## 2023-08-04 DIAGNOSIS — F41.1 GAD (GENERALIZED ANXIETY DISORDER): ICD-10-CM

## 2023-08-04 NOTE — TELEPHONE ENCOUNTER
Citalopram (Celexa) 20 MG tablet    Last Written Prescription Date:  02/08/2023  Last Fill Quantity: 90,   # refills: 0  Last Office Visit: 06/08/2023

## 2023-08-08 RX ORDER — CITALOPRAM HYDROBROMIDE 20 MG/1
20 TABLET ORAL DAILY
Qty: 90 TABLET | Refills: 0 | Status: SHIPPED | OUTPATIENT
Start: 2023-08-08 | End: 2023-11-06

## 2023-08-22 DIAGNOSIS — E78.2 MIXED HYPERLIPIDEMIA: ICD-10-CM

## 2023-08-23 NOTE — TELEPHONE ENCOUNTER
Simvastatin (Zocor) 20 MG tablet    Last Written Prescription Date:  02/08/2023  Last Fill Quantity: 90,   # refills: 0  Last Office Visit: 06/08/2023

## 2023-08-24 RX ORDER — SIMVASTATIN 20 MG
20 TABLET ORAL
Qty: 90 TABLET | Refills: 2 | Status: SHIPPED | OUTPATIENT
Start: 2023-08-24 | End: 2024-05-29

## 2023-08-25 ENCOUNTER — MEDICAL CORRESPONDENCE (OUTPATIENT)
Dept: HEALTH INFORMATION MANAGEMENT | Facility: HOSPITAL | Age: 73
End: 2023-08-25

## 2023-08-31 DIAGNOSIS — I10 ESSENTIAL HYPERTENSION, BENIGN: ICD-10-CM

## 2023-09-01 RX ORDER — LISINOPRIL 20 MG/1
TABLET ORAL
Qty: 90 TABLET | Refills: 0 | Status: SHIPPED | OUTPATIENT
Start: 2023-09-01 | End: 2023-12-04

## 2023-09-01 NOTE — TELEPHONE ENCOUNTER
lisinopril (ZESTRIL) 20 MG tablet       Last Written Prescription Date:  3/6/23  Last Fill Quantity: 90,   # refills: 1  Last Office Visit: 6/8/23  Future Office visit:    Next 5 appointments (look out 90 days)      Sep 13, 2023  9:45 AM  (Arrive by 9:30 AM)  SHORT with Meche Dougherty MD  [facility] [place] ([facility] [place] ) [address]  [place] [address]  [phone]

## 2023-09-05 ENCOUNTER — TELEPHONE (OUTPATIENT)
Dept: INTERVENTIONAL RADIOLOGY/VASCULAR | Facility: HOSPITAL | Age: 73
End: 2023-09-05

## 2023-09-05 NOTE — TELEPHONE ENCOUNTER
Left a message to remind patient of their herberth on 9/7. Also reminded patient to not take any antibiotics, steroids, or immunizations two weeks before and after this appt. And they need a .       GLENNA CARPENTER

## 2023-09-07 ENCOUNTER — HOSPITAL ENCOUNTER (OUTPATIENT)
Facility: HOSPITAL | Age: 73
Discharge: HOME | End: 2023-09-07
Attending: RADIOLOGY | Admitting: RADIOLOGY
Payer: COMMERCIAL

## 2023-09-07 ENCOUNTER — HOSPITAL ENCOUNTER (OUTPATIENT)
Dept: GENERAL RADIOLOGY | Facility: HOSPITAL | Age: 73
Discharge: HOME | End: 2023-09-07
Attending: ORTHOPAEDIC SURGERY
Payer: COMMERCIAL

## 2023-09-07 DIAGNOSIS — M25.552 LEFT HIP PAIN: ICD-10-CM

## 2023-09-07 PROCEDURE — 250N000009 HC RX 250: Performed by: STUDENT IN AN ORGANIZED HEALTH CARE EDUCATION/TRAINING PROGRAM

## 2023-09-07 PROCEDURE — 250N000011 HC RX IP 250 OP 636: Mod: JZ | Performed by: STUDENT IN AN ORGANIZED HEALTH CARE EDUCATION/TRAINING PROGRAM

## 2023-09-07 PROCEDURE — 255N000002 HC RX 255 OP 636: Mod: JZ | Performed by: STUDENT IN AN ORGANIZED HEALTH CARE EDUCATION/TRAINING PROGRAM

## 2023-09-07 PROCEDURE — 77002 NEEDLE LOCALIZATION BY XRAY: CPT

## 2023-09-07 RX ORDER — IOPAMIDOL 612 MG/ML
50 INJECTION, SOLUTION INTRAVASCULAR ONCE
Status: COMPLETED | OUTPATIENT
Start: 2023-09-07 | End: 2023-09-07

## 2023-09-07 RX ORDER — LIDOCAINE HYDROCHLORIDE 10 MG/ML
5 INJECTION, SOLUTION EPIDURAL; INFILTRATION; INTRACAUDAL; PERINEURAL ONCE
Status: COMPLETED | OUTPATIENT
Start: 2023-09-07 | End: 2023-09-07

## 2023-09-07 RX ORDER — LIDOCAINE HYDROCHLORIDE 10 MG/ML
10 INJECTION, SOLUTION EPIDURAL; INFILTRATION; INTRACAUDAL; PERINEURAL ONCE
Status: DISCONTINUED | OUTPATIENT
Start: 2023-09-07 | End: 2023-09-07

## 2023-09-07 RX ORDER — TRIAMCINOLONE ACETONIDE 40 MG/ML
40 INJECTION, SUSPENSION INTRA-ARTICULAR; INTRAMUSCULAR ONCE
Status: COMPLETED | OUTPATIENT
Start: 2023-09-07 | End: 2023-09-07

## 2023-09-07 RX ADMIN — TRIAMCINOLONE ACETONIDE 40 MG: 40 INJECTION, SUSPENSION INTRA-ARTICULAR; INTRAMUSCULAR at 09:09

## 2023-09-07 RX ADMIN — IOPAMIDOL 5 ML: 612 INJECTION, SOLUTION INTRAVENOUS at 09:10

## 2023-09-07 RX ADMIN — LIDOCAINE HYDROCHLORIDE 2 ML: 10 INJECTION, SOLUTION EPIDURAL; INFILTRATION; INTRACAUDAL; PERINEURAL at 09:09

## 2023-09-07 ASSESSMENT — ACTIVITIES OF DAILY LIVING (ADL): ADLS_ACUITY_SCORE: 35

## 2023-09-07 NOTE — DISCHARGE INSTRUCTIONS
Cell number on file:    Telephone Information:   Mobile [phone]     Is it ok to leave a message at this number(s)? Yes    Dr. Matthews completed your procedure on 9/7/2023.    Current Pain Level (0-10 Scale): 4/10  Post Pain Level (0-10):  0/10    Radiology Discharge instructions for Steroid Injection    Activity Level:     Do not do any heavy activity or exercise for 24 hours.   Do not drive for 4 hours after your injection.  Diet:   Return to your normal diet.  Medications:   If you have stopped taking your Aspirin, Coumadin/Warfarin, Ibuprofen, or any   other blood thinner for this procedure you may resume in the morning unless   your primary care provider has given you other instructions.    Diabetics may see an increase in blood sugar after steroid injections. If you are concerned about your blood sugar, please contact your family doctor.    Site Care:  Remove the bandage and bathe or shower the morning after the procedure.      Please allow two weeks to experience improvement in your pain.  If you have any further issues, please contact your provider.    Call your Primary Care Provider if you have the following (if your primary care provider is not available please seek emergency care):   Nausea with vomiting   Severe headache   Drowsiness or confusion   Redness or drainage at the injection or puncture site   Temperature over 101 degrees F   Other concerns   Worsening back pain   Stiff neck

## 2023-09-13 ENCOUNTER — OFFICE VISIT (OUTPATIENT)
Dept: FAMILY MEDICINE | Facility: OTHER | Age: 73
End: 2023-09-13
Attending: FAMILY MEDICINE
Payer: COMMERCIAL

## 2023-09-13 VITALS
DIASTOLIC BLOOD PRESSURE: 70 MMHG | BODY MASS INDEX: 32.31 KG/M2 | SYSTOLIC BLOOD PRESSURE: 112 MMHG | HEART RATE: 66 BPM | OXYGEN SATURATION: 96 % | TEMPERATURE: 97.3 F | WEIGHT: 200.19 LBS

## 2023-09-13 DIAGNOSIS — I10 ESSENTIAL HYPERTENSION, BENIGN: ICD-10-CM

## 2023-09-13 DIAGNOSIS — G62.89 OTHER POLYNEUROPATHY: ICD-10-CM

## 2023-09-13 DIAGNOSIS — G47.09 OTHER INSOMNIA: ICD-10-CM

## 2023-09-13 DIAGNOSIS — M1A.4720 CHRONIC GOUT DUE TO OTHER SECONDARY CAUSE INVOLVING TOE OF LEFT FOOT WITHOUT TOPHUS: Primary | ICD-10-CM

## 2023-09-13 LAB
ANION GAP SERPL CALCULATED.3IONS-SCNC: 10 MMOL/L (ref 7–15)
BUN SERPL-MCNC: 34.9 MG/DL (ref 8–23)
CALCIUM SERPL-MCNC: 9.8 MG/DL (ref 8.8–10.2)
CHLORIDE SERPL-SCNC: 102 MMOL/L (ref 98–107)
CREAT SERPL-MCNC: 1.22 MG/DL (ref 0.67–1.17)
DEPRECATED HCO3 PLAS-SCNC: 26 MMOL/L (ref 22–29)
EGFRCR SERPLBLD CKD-EPI 2021: 62 ML/MIN/1.73M2
GLUCOSE SERPL-MCNC: 94 MG/DL (ref 70–99)
POTASSIUM SERPL-SCNC: 5 MMOL/L (ref 3.4–5.3)
SODIUM SERPL-SCNC: 138 MMOL/L (ref 136–145)
URATE SERPL-MCNC: 8.4 MG/DL (ref 3.4–7)

## 2023-09-13 PROCEDURE — 80048 BASIC METABOLIC PNL TOTAL CA: CPT | Mod: ZL | Performed by: FAMILY MEDICINE

## 2023-09-13 PROCEDURE — G0463 HOSPITAL OUTPT CLINIC VISIT: HCPCS

## 2023-09-13 PROCEDURE — 36415 COLL VENOUS BLD VENIPUNCTURE: CPT | Mod: ZL | Performed by: FAMILY MEDICINE

## 2023-09-13 PROCEDURE — 84550 ASSAY OF BLOOD/URIC ACID: CPT | Mod: ZL | Performed by: FAMILY MEDICINE

## 2023-09-13 PROCEDURE — 99214 OFFICE O/P EST MOD 30 MIN: CPT | Performed by: FAMILY MEDICINE

## 2023-09-13 ASSESSMENT — ANXIETY QUESTIONNAIRES
GAD7 TOTAL SCORE: 2
1. FEELING NERVOUS, ANXIOUS, OR ON EDGE: SEVERAL DAYS
5. BEING SO RESTLESS THAT IT IS HARD TO SIT STILL: NOT AT ALL
7. FEELING AFRAID AS IF SOMETHING AWFUL MIGHT HAPPEN: NOT AT ALL
4. TROUBLE RELAXING: NOT AT ALL
3. WORRYING TOO MUCH ABOUT DIFFERENT THINGS: SEVERAL DAYS
IF YOU CHECKED OFF ANY PROBLEMS ON THIS QUESTIONNAIRE, HOW DIFFICULT HAVE THESE PROBLEMS MADE IT FOR YOU TO DO YOUR WORK, TAKE CARE OF THINGS AT HOME, OR GET ALONG WITH OTHER PEOPLE: NOT DIFFICULT AT ALL
GAD7 TOTAL SCORE: 2
6. BECOMING EASILY ANNOYED OR IRRITABLE: NOT AT ALL
2. NOT BEING ABLE TO STOP OR CONTROL WORRYING: NOT AT ALL

## 2023-09-13 ASSESSMENT — PATIENT HEALTH QUESTIONNAIRE - PHQ9
SUM OF ALL RESPONSES TO PHQ QUESTIONS 1-9: 1
SUM OF ALL RESPONSES TO PHQ QUESTIONS 1-9: 1
10. IF YOU CHECKED OFF ANY PROBLEMS, HOW DIFFICULT HAVE THESE PROBLEMS MADE IT FOR YOU TO DO YOUR WORK, TAKE CARE OF THINGS AT HOME, OR GET ALONG WITH OTHER PEOPLE: NOT DIFFICULT AT ALL

## 2023-09-13 ASSESSMENT — PAIN SCALES - GENERAL: PAINLEVEL: MILD PAIN (3)

## 2023-09-13 NOTE — PROGRESS NOTES
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  There are no Patient Instructions on file for this visit.    No follow-ups on file.    Meche Dougherty MD  [facility] - [place] Akers is a 72 year old, presenting for the following health issues:  Hypertension and Insomnia        9/13/2023     9:39 AM   Additional Questions   Roomed by Sona Dye   Accompanied by None         9/13/2023     9:39 AM   Patient Reported Additional Medications   Patient reports taking the following new medications None       HPI     Hypertension Follow-up    Do you check your blood pressure regularly outside of the clinic? Yes   Are you following a low salt diet? Yes  Are your blood pressures ever more than 140 on the top number (systolic) OR more   than 90 on the bottom number (diastolic), for example 140/90? No  How many servings of fruits and vegetables do you eat daily?  0-1  On average, how many sweetened beverages do you drink each day (Examples: soda, juice, sweet tea, etc.  Do NOT count diet or artificially sweetened beverages)?   2  How many days per week do you exercise enough to make your heart beat faster? 7  How many minutes a day do you exercise enough to make your heart beat faster? 60 or more  How many days per week do you miss taking your medication? 0  Insomnia  Onset/Duration:  Chronic  Description:   Frequency of insomnia:  several times a week  Time to fall asleep (sleep latency): Unknown  Middle of night awakening:  YES  Early morning awakening:  YES  Progression of Symptoms:  same  Accompanying Signs & Symptoms:  Daytime sleepiness/napping: No  Excessive snoring/apnea: Unknown  Restless legs: No  Waking to urinate: YES  Chronic pain:  YES- Just in left great toe  Depression symptoms (if yes, do PHQ9): YES  Anxiety symptoms (if yes, do SILVIA-7): YES  History:  Prior Insomnia: YES  New stressful situation: No  Precipitating factors:   Caffeine intake: YES  OTC decongestants: No  Any new medications: No  Alleviating factors:  Self medicating (alcohol, etc.):  No  Stress-reduction (exercise, yoga, meditation etc): No  Therapies tried and outcome: none      Review of Systems   Constitutional, HEENT, cardiovascular, pulmonary, gi and gu systems are negative, except as otherwise noted.      Objective    /70 (BP Location: Right arm, Patient Position: Sitting, Cuff Size: Adult Large)   Pulse 66   Temp 97.3  F (36.3  C) (Tympanic)   Wt 90.8 kg (200 lb 3 oz)   SpO2 96%   BMI 32.31 kg/m    Body mass index is 32.31 kg/m .  Physical Exam   GENERAL: healthy, alert and no distress  NECK: no adenopathy, no asymmetry, masses, or scars and thyroid normal to palpation  RESP: lungs clear to auscultation - no rales, rhonchi or wheezes  CV: regular rate and rhythm, normal S1 S2, no S3 or S4, no murmur, click or rub, no peripheral edema and peripheral pulses strong  ABDOMEN: soft, nontender, no hepatosplenomegaly, no masses and bowel sounds normal  MS: no gross musculoskeletal defects noted, no edema  PSYCH: mentation appears normal, affect normal/bright    Results for orders placed or performed in visit on 09/13/23   Uric acid     Status: Abnormal   Result Value Ref Range    Uric Acid 8.4 (H) 3.4 - 7.0 mg/dL   Basic metabolic panel     Status: Abnormal   Result Value Ref Range    Sodium 138 136 -  145 mmol/L    Potassium 5.0 3.4 - 5.3 mmol/L    Chloride 102 98 - 107 mmol/L    Carbon Dioxide (CO2) 26 22 - 29 mmol/L    Anion Gap 10 7 - 15 mmol/L    Urea Nitrogen 34.9 (H) 8.0 - 23.0 mg/dL    Creatinine 1.22 (H) 0.67 - 1.17 mg/dL    Calcium 9.8 8.8 - 10.2 mg/dL    Glucose 94 70 - 99 mg/dL    GFR Estimate 62 >60 mL/min/1.73m2         Answers submitted by the patient for this visit:  Patient Health Questionnaire (Submitted on 9/13/2023)  If you checked off any problems, how difficult have these problems made it for you to do your work, take care of things at home, or get along with other people?: Not difficult at all  PHQ9 TOTAL SCORE: 1

## 2023-09-15 RX ORDER — COLCHICINE 0.6 MG/1
TABLET ORAL
Qty: 6 TABLET | Refills: 0 | Status: SHIPPED | OUTPATIENT
Start: 2023-09-15

## 2023-09-15 RX ORDER — ALLOPURINOL 100 MG/1
100 TABLET ORAL DAILY
Qty: 30 TABLET | Refills: 2 | Status: SHIPPED | OUTPATIENT
Start: 2023-09-15 | End: 2023-12-22

## 2023-11-06 DIAGNOSIS — F41.1 GAD (GENERALIZED ANXIETY DISORDER): ICD-10-CM

## 2023-11-06 DIAGNOSIS — F34.1 DYSTHYMIC DISORDER: ICD-10-CM

## 2023-11-06 RX ORDER — CITALOPRAM HYDROBROMIDE 20 MG/1
20 TABLET ORAL DAILY
Qty: 90 TABLET | Refills: 0 | Status: SHIPPED | OUTPATIENT
Start: 2023-11-06 | End: 2024-02-05

## 2023-11-06 NOTE — TELEPHONE ENCOUNTER
Celexa      Last Written Prescription Date:  8/8/23  Last Fill Quantity: 90,   # refills: 0  Last Office Visit: 9/13/23  Future Office visit:    Next 5 appointments (look out 90 days)      Jasvir 15, 2024  9:00 AM  (Arrive by 8:45 AM)  SHORT with Meche Dougherty MD  [facility] [place] ([facility] [place] ) [address]  [place] [address]  [phone]             Routing refill request to provider for review/approval because:

## 2023-11-27 DIAGNOSIS — I10 ESSENTIAL HYPERTENSION, BENIGN: ICD-10-CM

## 2023-11-29 RX ORDER — AMLODIPINE BESYLATE 10 MG/1
10 TABLET ORAL DAILY
Qty: 90 TABLET | Refills: 1 | Status: SHIPPED | OUTPATIENT
Start: 2023-11-29 | End: 2024-05-29

## 2023-11-29 NOTE — TELEPHONE ENCOUNTER
Amlodipine      Last Written Prescription Date:  2/8/23  Last Fill Quantity: 90,   # refills: 1  Last Office Visit: 9/13/23  Future Office visit:    Next 5 appointments (look out 90 days)      Jasvir 15, 2024  9:00 AM  (Arrive by 8:45 AM)  SHORT with Meche Dougherty MD  [facility] [place] ([facility] [place] ) [address]  [place] [address]  [phone]             Routing refill request to provider for review/approval because:

## 2023-12-04 DIAGNOSIS — I10 ESSENTIAL HYPERTENSION, BENIGN: ICD-10-CM

## 2023-12-04 RX ORDER — LISINOPRIL 20 MG/1
TABLET ORAL
Qty: 90 TABLET | Refills: 0 | Status: SHIPPED | OUTPATIENT
Start: 2023-12-04 | End: 2024-03-04

## 2023-12-04 NOTE — TELEPHONE ENCOUNTER
Lisinopril  Last Written Prescription Date: 9/1/23  Last Fill Quantity: 90 # of Refills: 0  Last Office Visit: 9/13/23

## 2023-12-10 NOTE — TELEPHONE ENCOUNTER
Amlodipine 2.5mg-   Appointments in Next Year    Mar 23, 2022  2:30 PM  (Arrive by 2:15 PM)  SHORT with Meche Dougherty MD  [facility] [place] ([facility] [place] ) [phone]         Admission

## 2023-12-20 DIAGNOSIS — M1A.4720 CHRONIC GOUT DUE TO OTHER SECONDARY CAUSE INVOLVING TOE OF LEFT FOOT WITHOUT TOPHUS: ICD-10-CM

## 2023-12-20 NOTE — TELEPHONE ENCOUNTER
Allopurinol      Last Written Prescription Date:  9/15/23  Last Fill Quantity: 30,   # refills: 2  Last Office Visit: 9/13/23  Future Office visit:    Next 5 appointments (look out 90 days)      Jasvir 15, 2024  9:00 AM  (Arrive by 8:45 AM)  SHORT with Meche Dougherty MD  [facility] [place] ([facility] [place] ) [address]  [place] [address]  [phone]             Routing refill request to provider for review/approval because:

## 2023-12-22 RX ORDER — ALLOPURINOL 100 MG/1
100 TABLET ORAL DAILY
Qty: 30 TABLET | Refills: 0 | Status: SHIPPED | OUTPATIENT
Start: 2023-12-22 | End: 2024-01-15

## 2024-01-15 ENCOUNTER — OFFICE VISIT (OUTPATIENT)
Dept: FAMILY MEDICINE | Facility: OTHER | Age: 74
End: 2024-01-15
Attending: FAMILY MEDICINE
Payer: COMMERCIAL

## 2024-01-15 VITALS
BODY MASS INDEX: 32.72 KG/M2 | TEMPERATURE: 97.7 F | HEART RATE: 53 BPM | OXYGEN SATURATION: 98 % | SYSTOLIC BLOOD PRESSURE: 142 MMHG | WEIGHT: 203.6 LBS | HEIGHT: 66 IN | DIASTOLIC BLOOD PRESSURE: 60 MMHG

## 2024-01-15 DIAGNOSIS — M1A.4720 CHRONIC GOUT DUE TO OTHER SECONDARY CAUSE INVOLVING TOE OF LEFT FOOT WITHOUT TOPHUS: ICD-10-CM

## 2024-01-15 DIAGNOSIS — I10 ESSENTIAL HYPERTENSION, BENIGN: Primary | ICD-10-CM

## 2024-01-15 DIAGNOSIS — G47.09 OTHER INSOMNIA: ICD-10-CM

## 2024-01-15 LAB
ANION GAP SERPL CALCULATED.3IONS-SCNC: 11 MMOL/L (ref 7–15)
BUN SERPL-MCNC: 26.3 MG/DL (ref 8–23)
CALCIUM SERPL-MCNC: 9.8 MG/DL (ref 8.8–10.2)
CHLORIDE SERPL-SCNC: 108 MMOL/L (ref 98–107)
CREAT SERPL-MCNC: 1.08 MG/DL (ref 0.67–1.17)
DEPRECATED HCO3 PLAS-SCNC: 24 MMOL/L (ref 22–29)
EGFRCR SERPLBLD CKD-EPI 2021: 72 ML/MIN/1.73M2
GLUCOSE SERPL-MCNC: 99 MG/DL (ref 70–99)
POTASSIUM SERPL-SCNC: 4 MMOL/L (ref 3.4–5.3)
SODIUM SERPL-SCNC: 143 MMOL/L (ref 135–145)

## 2024-01-15 PROCEDURE — 99214 OFFICE O/P EST MOD 30 MIN: CPT | Performed by: FAMILY MEDICINE

## 2024-01-15 PROCEDURE — 80048 BASIC METABOLIC PNL TOTAL CA: CPT | Mod: ZL | Performed by: FAMILY MEDICINE

## 2024-01-15 PROCEDURE — 36415 COLL VENOUS BLD VENIPUNCTURE: CPT | Mod: ZL | Performed by: FAMILY MEDICINE

## 2024-01-15 PROCEDURE — G0463 HOSPITAL OUTPT CLINIC VISIT: HCPCS

## 2024-01-15 RX ORDER — ALLOPURINOL 100 MG/1
200 TABLET ORAL DAILY
Qty: 180 TABLET | Refills: 2 | Status: SHIPPED | OUTPATIENT
Start: 2024-01-15

## 2024-01-15 RX ORDER — TRAZODONE HYDROCHLORIDE 50 MG/1
50 TABLET, FILM COATED ORAL AT BEDTIME
Qty: 90 TABLET | Refills: 2 | Status: SHIPPED | OUTPATIENT
Start: 2024-01-15 | End: 2024-05-07

## 2024-01-15 ASSESSMENT — PAIN SCALES - GENERAL: PAINLEVEL: NO PAIN (0)

## 2024-01-15 NOTE — PROGRESS NOTES
"  Assessment & Plan     Essential hypertension, benign  Systolic BP elevated today however, patient did not take his meds this morning. Discussed lifestyle changes including decreasing alcohol, improving sleep and pain, and using ibuprofen in moderation. No changes to medications at this time.  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   Follow-up 2 mos  - Basic metabolic panel    Other insomnia  Current sleep disruption is not improving with amitriptyline. Discontinuing amitriptyline and replacing with full dose of trazodone at 50 mg daily (he had been using old trazodone at 25mg which was helping).  Discussed ETOH can disrupt sleep also     Hip Pain  Patient has had prior steroid injections with success. Encouraged patient to set-up appointment for injections here at the hospital through [facility].    Patient agreeable with plan. Follow-up in 2 months or sooner if new or worsening symptoms.     Provider  Link to [facility] Help Grid :[id number]}     BMI:   Estimated body mass index is 32.86 kg/m  as calculated from the following:    Height as of this encounter: 1.676 m (5' 6\").    Weight as of this encounter: 92.4 kg (203 lb 9.6 oz).   Weight management plan: Discussed healthy diet and exercise guidelines    Patient was agreeable to this plan and had no further questions.  There are no Patient Instructions on file for this visit.    No follow-ups on file.    Meche Dougherty MD  [facility] - [place] Akers is a 73 year old, presenting for the following health issues:  Hypertension, Arthritis, and Insomnia        1/15/2024     8:49 AM   Additional Questions   Roomed by Omaira ZHU LPN   Accompanied by self       HPI   73 year old male presents to clinic for HTN, gout, and " insomnia follow up. Patient is exercising 5x/week and tries to eat a well-balanced diet but feels there is room for improvement. He is attempting to decrease alcohol intake to 1 drink per day or beer or wine. He checks blood pressure occasionally at his pharmacy. Today he came to his appointment without taking blood pressure meds.     He feels the gout medication has not made much of a difference and he continues to have intermittent pain. He is trying to eat less red meat. He's also starting to experience hip pain that seems to be worsening and especially when walking/exercising. He had a steroid injection in 9/2023, which seemed to work well but not sure if he wants to continue shots due to insurance coverage. He uses Voltaren and ibuprofen as needed for pain with some relief.    Insomnia has been constant, waking up nightly to urinate and will watch tv for a few hours until he falls asleep again. He ran out of his amitriptyline medication and had leftover trazodone so he's been taking 1/2 tablet nightly without much relief.     Hypertension Follow-up    Do you check your blood pressure regularly outside of the clinic? Yes   Are you following a low salt diet? Yes  Are your blood pressures ever more than 140 on the top number (systolic) OR more   than 90 on the bottom number (diastolic), for example 140/90? No  How many servings of fruits and vegetables do you eat daily?  0-1  On average, how many sweetened beverages do you drink each day (Examples: soda, juice, sweet tea, etc.  Do NOT count diet or artificially sweetened beverages)?   3  How many days per week do you exercise enough to make your heart beat faster? 5  How many minutes a day do you exercise enough to make your heart beat faster? 9 or less  How many days per week do you miss taking your medication? 0    Gout/ single inflamed joint   Onset: ongoing  Description:   Location: big toe - left  Joint Swelling: no   Redness: no   Pain: YES- 2  Intensity:  "mild  Progression of Symptoms:  same  Accompanying Signs & Symptoms:  Fevers: no   History:   Trauma to the area: no   Previous history of gout: YES   Recent illness:  no   Precipitating factors:   Diet-rich in purine: seafood, red meat, and alcohol  Alcohol use: YES  Diuretic use: no   Alleviating factors:  none    Therapies Tried and outcome: none and anti-inflammatories    Insomnia  Onset: ongoing  Description:   Time to fall asleep (sleep latency): 30 minutes  Middle of night awakening:  YES  Early morning awakening:  YES  Progression of Symptoms:  constant  Accompanying Signs & Symptoms:  Daytime sleepiness/napping: no   Excessive snoring/apnea: no   Restless legs: no   Frequent urination: YES  Chronic pain:  YES- big toe & lower back  History:  Prior Insomnia: YES  Precipitating factors:   New stressful situation: YES- bought a car that has been having issues  Caffeine intake: YES- on tuesdays  OTC decongestants: no   Any new medications: no   Alleviating factors:  Self medicating (alcohol, etc.):  No    Therapies Tried and outcome: medications- haven't worked     Hip Pain  Patient complains of left hip pain. Onset of the symptoms was several years ago. Inciting event: none. The patient reports the hip pain is worse with walking . Associated symptoms: none. Aggravating symptoms include: walking, exercising. Patient has had prior hip problems. Previous visits for this problem: yes, last seen 3 months ago by for steroid injection .     Constitutional, HEENT, cardiovascular, pulmonary, gi and gu systems are negative, except as otherwise noted.      Objective    BP (!) 142/60 (BP Location: Left arm, Patient Position: Sitting, Cuff Size: Adult Regular)   Pulse 53   Temp 97.7  F (36.5  C) (Tympanic)   Ht 1.676 m (5' 6\")   Wt 92.4 kg (203 lb 9.6 oz)   SpO2 98%   BMI 32.86 kg/m    Body mass index is 32.86 kg/m .  Physical Exam   GENERAL: healthy, alert and no distress  EYES: Eyes grossly normal to inspection, " PERRL and conjunctivae and sclerae normal  HENT: ear canals and TM's normal, nose and mouth without ulcers or lesions  NECK: no adenopathy, no asymmetry, masses, or scars and thyroid normal to palpation  RESP: lungs clear to auscultation - no rales, rhonchi or wheezes  CV: regular rate and rhythm, normal S1 S2, no S3 or S4, no murmur, click or rub, no peripheral edema and peripheral pulses strong  MS: no gross musculoskeletal defects noted. Hip flexion strength 4+. Antalgic gait. No pain upon palpation. No edema.   SKIN: no suspicious lesions or rashes  NEURO: Normal strength and tone, mentation intact and speech normal  PSYCH: mentation appears normal, affect normal/bright    Results for orders placed or performed in visit on 01/15/24 (from the past 24 hour(s))   Basic metabolic panel   Result Value Ref Range    Sodium 143 135 - 145 mmol/L    Potassium 4.0 3.4 - 5.3 mmol/L    Chloride 108 (H) 98 - 107 mmol/L    Carbon Dioxide (CO2) 24 22 - 29 mmol/L    Anion Gap 11 7 - 15 mmol/L    Urea Nitrogen 26.3 (H) 8.0 - 23.0 mg/dL    Creatinine 1.08 0.67 - 1.17 mg/dL    GFR Estimate 72 >60 mL/min/1.73m2    Calcium 9.8 8.8 - 10.2 mg/dL    Glucose 99 70 - 99 mg/dL           Physician Attestation   I, Meche Dougherty MD, was present with the medical/BRADLEY student who participated in the service and in the documentation of the note.  I have verified the history and personally performed the physical exam and medical decision making.  I agree with the assessment and plan of care as documented in the note.      Items personally reviewed: vitals, labs, and agree with the interpretation documented in the note.    Meche Dougherty MD

## 2024-01-16 RX ORDER — ALLOPURINOL 100 MG/1
100 TABLET ORAL DAILY
Qty: 30 TABLET | Refills: 0 | OUTPATIENT
Start: 2024-01-16

## 2024-02-05 DIAGNOSIS — F41.1 GAD (GENERALIZED ANXIETY DISORDER): ICD-10-CM

## 2024-02-05 DIAGNOSIS — F34.1 DYSTHYMIC DISORDER: ICD-10-CM

## 2024-02-05 RX ORDER — CITALOPRAM HYDROBROMIDE 20 MG/1
20 TABLET ORAL DAILY
Qty: 90 TABLET | Refills: 3 | Status: SHIPPED | OUTPATIENT
Start: 2024-02-05

## 2024-02-05 NOTE — TELEPHONE ENCOUNTER
Celexa       Last Written Prescription Date:  11/06/2023  Last Fill Quantity: 90,   # refills: 0  Last Office Visit: 1/15/2024  Future Office visit:    Next 5 appointments (look out 90 days)      Mar 15, 2024  9:30 AM  (Arrive by 9:15 AM)  SHORT with Meche Dougherty MD  [facility] [place] ([facility] [place] ) [address]  [place] [address]  [phone]

## 2024-02-08 DIAGNOSIS — M25.552 LEFT HIP PAIN: Primary | ICD-10-CM

## 2024-02-13 ENCOUNTER — TELEPHONE (OUTPATIENT)
Dept: INTERVENTIONAL RADIOLOGY/VASCULAR | Facility: HOSPITAL | Age: 74
End: 2024-02-13

## 2024-02-13 NOTE — TELEPHONE ENCOUNTER
Left a message to remind patient of their herberth on 2/14. Also reminded patient to not take any antibiotics, steroids, or immunizations two weeks before and after this appt. And they need a .        GLENNA CARPENTER

## 2024-02-14 ENCOUNTER — HOSPITAL ENCOUNTER (OUTPATIENT)
Facility: HOSPITAL | Age: 74
Discharge: HOME | End: 2024-02-14
Attending: STUDENT IN AN ORGANIZED HEALTH CARE EDUCATION/TRAINING PROGRAM | Admitting: STUDENT IN AN ORGANIZED HEALTH CARE EDUCATION/TRAINING PROGRAM
Payer: COMMERCIAL

## 2024-02-14 ENCOUNTER — HOSPITAL ENCOUNTER (OUTPATIENT)
Dept: GENERAL RADIOLOGY | Facility: HOSPITAL | Age: 74
Discharge: HOME | End: 2024-02-14
Attending: ORTHOPAEDIC SURGERY | Admitting: STUDENT IN AN ORGANIZED HEALTH CARE EDUCATION/TRAINING PROGRAM
Payer: COMMERCIAL

## 2024-02-14 DIAGNOSIS — M25.552 LEFT HIP PAIN: ICD-10-CM

## 2024-02-14 PROCEDURE — 250N000009 HC RX 250: Performed by: RADIOLOGY

## 2024-02-14 PROCEDURE — 250N000011 HC RX IP 250 OP 636: Performed by: RADIOLOGY

## 2024-02-14 PROCEDURE — 20610 DRAIN/INJ JOINT/BURSA W/O US: CPT | Mod: LT

## 2024-02-14 PROCEDURE — 255N000002 HC RX 255 OP 636: Performed by: RADIOLOGY

## 2024-02-14 RX ORDER — LIDOCAINE HYDROCHLORIDE 10 MG/ML
5 INJECTION, SOLUTION EPIDURAL; INFILTRATION; INTRACAUDAL; PERINEURAL ONCE
Status: COMPLETED | OUTPATIENT
Start: 2024-02-14 | End: 2024-02-14

## 2024-02-14 RX ORDER — IOPAMIDOL 612 MG/ML
50 INJECTION, SOLUTION INTRAVASCULAR ONCE
Status: COMPLETED | OUTPATIENT
Start: 2024-02-14 | End: 2024-02-14

## 2024-02-14 RX ORDER — TRIAMCINOLONE ACETONIDE 40 MG/ML
40 INJECTION, SUSPENSION INTRA-ARTICULAR; INTRAMUSCULAR ONCE
Status: COMPLETED | OUTPATIENT
Start: 2024-02-14 | End: 2024-02-14

## 2024-02-14 RX ADMIN — TRIAMCINOLONE ACETONIDE 40 MG: 40 INJECTION, SUSPENSION INTRA-ARTICULAR; INTRAMUSCULAR at 08:13

## 2024-02-14 RX ADMIN — LIDOCAINE HYDROCHLORIDE 5 ML: 10 INJECTION, SOLUTION EPIDURAL; INFILTRATION; INTRACAUDAL; PERINEURAL at 08:13

## 2024-02-14 RX ADMIN — IOPAMIDOL 3 ML: 612 INJECTION, SOLUTION INTRAVENOUS at 08:14

## 2024-02-14 NOTE — DISCHARGE INSTRUCTIONS
Cell number on file:    Telephone Information:   Mobile [phone]     Is it ok to leave a message at this number(s)? Yes    DR COLLINS completed your procedure on 2/14/2024.    Current Pain Level (0-10 Scale): 2/10  Post Pain Level (0-10):  2/10    Radiology Discharge instructions for Steroid Injection    Activity Level:     Do not do any heavy activity or exercise for 24 hours.   Do not drive for 4 hours after your injection.  Diet:   Return to your normal diet.  Medications:   If you have stopped taking your Aspirin, Coumadin/Warfarin, Ibuprofen, or any   other blood thinner for this procedure you may resume in the morning unless   your primary care provider has given you other instructions.    Diabetics may see an increase in blood sugar after steroid injections. If you are concerned about your blood sugar, please contact your family doctor.    Site Care:  Remove the bandage and bathe or shower the morning after the procedure.      Please allow two weeks to experience improvement in your pain.  If you have any further issues, please contact your provider.    Call your Primary Care Provider if you have the following (if your primary care provider is not available please seek emergency care):   Nausea with vomiting   Severe headache   Drowsiness or confusion   Redness or drainage at the injection or puncture site   Temperature over 101 degrees F   Other concerns   Worsening back pain   Stiff neck

## 2024-03-04 DIAGNOSIS — I10 ESSENTIAL HYPERTENSION, BENIGN: ICD-10-CM

## 2024-03-04 RX ORDER — LISINOPRIL 20 MG/1
TABLET ORAL
Qty: 90 TABLET | Refills: 0 | Status: SHIPPED | OUTPATIENT
Start: 2024-03-04 | End: 2024-05-31

## 2024-03-04 NOTE — TELEPHONE ENCOUNTER
Have You Had A Facial Before?: has had a previous facial Lisinopril 20 mg      Last Written Prescription Date:  12-4-23  Last Fill Quantity: 90,   # refills: 0  Last Office Visit: 1-15-24  Future Office visit:    Next 5 appointments (look out 90 days)      Mar 15, 2024  9:30 AM  (Arrive by 9:15 AM)  SHORT with Meche Dougherty MD  [facility] [place] ([facility] [place] ) [address]  [place] [address]  [phone]

## 2024-05-07 ENCOUNTER — OFFICE VISIT (OUTPATIENT)
Dept: FAMILY MEDICINE | Facility: OTHER | Age: 74
End: 2024-05-07
Attending: FAMILY MEDICINE
Payer: COMMERCIAL

## 2024-05-07 ENCOUNTER — LAB (OUTPATIENT)
Dept: LAB | Facility: OTHER | Age: 74
End: 2024-05-07
Attending: FAMILY MEDICINE
Payer: COMMERCIAL

## 2024-05-07 VITALS
SYSTOLIC BLOOD PRESSURE: 136 MMHG | WEIGHT: 207.3 LBS | OXYGEN SATURATION: 96 % | HEART RATE: 57 BPM | TEMPERATURE: 97.2 F | DIASTOLIC BLOOD PRESSURE: 60 MMHG | BODY MASS INDEX: 33.46 KG/M2

## 2024-05-07 DIAGNOSIS — M16.7 OTHER SECONDARY OSTEOARTHRITIS OF LEFT HIP: ICD-10-CM

## 2024-05-07 DIAGNOSIS — Z01.818 PREOP GENERAL PHYSICAL EXAM: Primary | ICD-10-CM

## 2024-05-07 DIAGNOSIS — G47.09 OTHER INSOMNIA: ICD-10-CM

## 2024-05-07 DIAGNOSIS — I10 ESSENTIAL HYPERTENSION, BENIGN: ICD-10-CM

## 2024-05-07 DIAGNOSIS — D64.9 ANEMIA OF UNKNOWN ETIOLOGY: ICD-10-CM

## 2024-05-07 DIAGNOSIS — R07.89 ATYPICAL CHEST PAIN: ICD-10-CM

## 2024-05-07 DIAGNOSIS — Z01.818 PREOP GENERAL PHYSICAL EXAM: ICD-10-CM

## 2024-05-07 DIAGNOSIS — K92.1 MELENA: ICD-10-CM

## 2024-05-07 DIAGNOSIS — N18.30 STAGE 3 CHRONIC KIDNEY DISEASE, UNSPECIFIED WHETHER STAGE 3A OR 3B CKD (H): ICD-10-CM

## 2024-05-07 DIAGNOSIS — K21.9 GASTROESOPHAGEAL REFLUX DISEASE WITHOUT ESOPHAGITIS: ICD-10-CM

## 2024-05-07 PROBLEM — J43.9 PULMONARY EMPHYSEMA, UNSPECIFIED EMPHYSEMA TYPE (H): Status: RESOLVED | Noted: 2022-04-20 | Resolved: 2024-05-07

## 2024-05-07 LAB
ALBUMIN SERPL BCG-MCNC: 4.3 G/DL (ref 3.5–5.2)
ALP SERPL-CCNC: 124 U/L (ref 40–150)
ALT SERPL W P-5'-P-CCNC: 25 U/L (ref 0–70)
ANION GAP SERPL CALCULATED.3IONS-SCNC: 11 MMOL/L (ref 7–15)
AST SERPL W P-5'-P-CCNC: 32 U/L (ref 0–45)
ATRIAL RATE - MUSE: 54 BPM
BASOPHILS # BLD AUTO: 0 10E3/UL (ref 0–0.2)
BASOPHILS NFR BLD AUTO: 0 %
BILIRUB SERPL-MCNC: 0.7 MG/DL
BUN SERPL-MCNC: 28.9 MG/DL (ref 8–23)
CALCIUM SERPL-MCNC: 9.7 MG/DL (ref 8.8–10.2)
CHLORIDE SERPL-SCNC: 103 MMOL/L (ref 98–107)
CREAT SERPL-MCNC: 1.38 MG/DL (ref 0.67–1.17)
DEPRECATED HCO3 PLAS-SCNC: 24 MMOL/L (ref 22–29)
DIASTOLIC BLOOD PRESSURE - MUSE: NORMAL MMHG
EGFRCR SERPLBLD CKD-EPI 2021: 53 ML/MIN/1.73M2
EOSINOPHIL # BLD AUTO: 0.1 10E3/UL (ref 0–0.7)
EOSINOPHIL NFR BLD AUTO: 2 %
ERYTHROCYTE [DISTWIDTH] IN BLOOD BY AUTOMATED COUNT: 13.9 % (ref 10–15)
ERYTHROCYTE [DISTWIDTH] IN BLOOD BY AUTOMATED COUNT: 14 % (ref 10–15)
FERRITIN SERPL-MCNC: 331 NG/ML (ref 31–409)
GLUCOSE SERPL-MCNC: 100 MG/DL (ref 70–99)
HCT VFR BLD AUTO: 35.5 % (ref 40–53)
HCT VFR BLD AUTO: 35.6 % (ref 40–53)
HGB BLD-MCNC: 11.7 G/DL (ref 13.3–17.7)
HGB BLD-MCNC: 11.7 G/DL (ref 13.3–17.7)
IMM GRANULOCYTES # BLD: 0 10E3/UL
IMM GRANULOCYTES NFR BLD: 0 %
INTERPRETATION ECG - MUSE: NORMAL
IRON BINDING CAPACITY (ROCHE): 296 UG/DL (ref 240–430)
IRON SATN MFR SERPL: 19 % (ref 15–46)
IRON SERPL-MCNC: 57 UG/DL (ref 61–157)
LYMPHOCYTES # BLD AUTO: 1.3 10E3/UL (ref 0.8–5.3)
LYMPHOCYTES NFR BLD AUTO: 17 %
MCH RBC QN AUTO: 32.3 PG (ref 26.5–33)
MCH RBC QN AUTO: 32.6 PG (ref 26.5–33)
MCHC RBC AUTO-ENTMCNC: 32.9 G/DL (ref 31.5–36.5)
MCHC RBC AUTO-ENTMCNC: 33 G/DL (ref 31.5–36.5)
MCV RBC AUTO: 98 FL (ref 78–100)
MCV RBC AUTO: 99 FL (ref 78–100)
MONOCYTES # BLD AUTO: 0.8 10E3/UL (ref 0–1.3)
MONOCYTES NFR BLD AUTO: 11 %
NEUTROPHILS # BLD AUTO: 5.3 10E3/UL (ref 1.6–8.3)
NEUTROPHILS NFR BLD AUTO: 70 %
NRBC # BLD AUTO: 0 10E3/UL
NRBC BLD AUTO-RTO: 0 /100
P AXIS - MUSE: 44 DEGREES
PLATELET # BLD AUTO: 237 10E3/UL (ref 150–450)
PLATELET # BLD AUTO: 253 10E3/UL (ref 150–450)
POTASSIUM SERPL-SCNC: 4.3 MMOL/L (ref 3.4–5.3)
PR INTERVAL - MUSE: 166 MS
PROT SERPL-MCNC: 7.9 G/DL (ref 6.4–8.3)
QRS DURATION - MUSE: 146 MS
QT - MUSE: 464 MS
QTC - MUSE: 440 MS
R AXIS - MUSE: 40 DEGREES
RBC # BLD AUTO: 3.59 10E6/UL (ref 4.4–5.9)
RBC # BLD AUTO: 3.62 10E6/UL (ref 4.4–5.9)
RETICS # AUTO: 0.09 10E6/UL (ref 0.03–0.1)
RETICS/RBC NFR AUTO: 2.5 % (ref 0.5–2)
SODIUM SERPL-SCNC: 138 MMOL/L (ref 135–145)
SYSTOLIC BLOOD PRESSURE - MUSE: NORMAL MMHG
T AXIS - MUSE: 9 DEGREES
VENTRICULAR RATE- MUSE: 54 BPM
WBC # BLD AUTO: 7.3 10E3/UL (ref 4–11)
WBC # BLD AUTO: 7.5 10E3/UL (ref 4–11)

## 2024-05-07 PROCEDURE — 36415 COLL VENOUS BLD VENIPUNCTURE: CPT | Mod: ZL

## 2024-05-07 PROCEDURE — 93010 ELECTROCARDIOGRAM REPORT: CPT | Mod: 77 | Performed by: INTERNAL MEDICINE

## 2024-05-07 PROCEDURE — 84295 ASSAY OF SERUM SODIUM: CPT | Mod: ZL

## 2024-05-07 PROCEDURE — 93005 ELECTROCARDIOGRAM TRACING: CPT | Performed by: FAMILY MEDICINE

## 2024-05-07 PROCEDURE — 84155 ASSAY OF PROTEIN SERUM: CPT | Mod: ZL

## 2024-05-07 PROCEDURE — 82728 ASSAY OF FERRITIN: CPT | Mod: ZL | Performed by: FAMILY MEDICINE

## 2024-05-07 PROCEDURE — 85060 BLOOD SMEAR INTERPRETATION: CPT | Performed by: PATHOLOGY

## 2024-05-07 PROCEDURE — 85041 AUTOMATED RBC COUNT: CPT | Mod: ZL | Performed by: FAMILY MEDICINE

## 2024-05-07 PROCEDURE — 85045 AUTOMATED RETICULOCYTE COUNT: CPT | Mod: ZL | Performed by: FAMILY MEDICINE

## 2024-05-07 PROCEDURE — 99215 OFFICE O/P EST HI 40 MIN: CPT | Performed by: FAMILY MEDICINE

## 2024-05-07 PROCEDURE — 83550 IRON BINDING TEST: CPT | Mod: ZL | Performed by: FAMILY MEDICINE

## 2024-05-07 PROCEDURE — G0463 HOSPITAL OUTPT CLINIC VISIT: HCPCS

## 2024-05-07 PROCEDURE — 85041 AUTOMATED RBC COUNT: CPT | Mod: ZL

## 2024-05-07 RX ORDER — TRAZODONE HYDROCHLORIDE 50 MG/1
75 TABLET, FILM COATED ORAL AT BEDTIME
Qty: 135 TABLET | Refills: 2 | Status: SHIPPED | OUTPATIENT
Start: 2024-05-07

## 2024-05-07 RX ORDER — OMEPRAZOLE 40 MG/1
40 CAPSULE, DELAYED RELEASE ORAL 2 TIMES DAILY
Qty: 180 CAPSULE | Refills: 1 | Status: SHIPPED | OUTPATIENT
Start: 2024-05-07 | End: 2024-06-17

## 2024-05-07 RX ORDER — OMEPRAZOLE 40 MG/1
40 CAPSULE, DELAYED RELEASE ORAL 2 TIMES DAILY
Qty: 180 CAPSULE | Refills: 1 | Status: SHIPPED | OUTPATIENT
Start: 2024-05-07 | End: 2024-05-07

## 2024-05-07 ASSESSMENT — ANXIETY QUESTIONNAIRES
8. IF YOU CHECKED OFF ANY PROBLEMS, HOW DIFFICULT HAVE THESE MADE IT FOR YOU TO DO YOUR WORK, TAKE CARE OF THINGS AT HOME, OR GET ALONG WITH OTHER PEOPLE?: NOT DIFFICULT AT ALL
2. NOT BEING ABLE TO STOP OR CONTROL WORRYING: NOT AT ALL
7. FEELING AFRAID AS IF SOMETHING AWFUL MIGHT HAPPEN: NOT AT ALL
GAD7 TOTAL SCORE: 1
6. BECOMING EASILY ANNOYED OR IRRITABLE: NOT AT ALL
IF YOU CHECKED OFF ANY PROBLEMS ON THIS QUESTIONNAIRE, HOW DIFFICULT HAVE THESE PROBLEMS MADE IT FOR YOU TO DO YOUR WORK, TAKE CARE OF THINGS AT HOME, OR GET ALONG WITH OTHER PEOPLE: NOT DIFFICULT AT ALL
GAD7 TOTAL SCORE: 1
7. FEELING AFRAID AS IF SOMETHING AWFUL MIGHT HAPPEN: NOT AT ALL
3. WORRYING TOO MUCH ABOUT DIFFERENT THINGS: NOT AT ALL
5. BEING SO RESTLESS THAT IT IS HARD TO SIT STILL: NOT AT ALL
4. TROUBLE RELAXING: NOT AT ALL
GAD7 TOTAL SCORE: 1
1. FEELING NERVOUS, ANXIOUS, OR ON EDGE: SEVERAL DAYS

## 2024-05-07 ASSESSMENT — PAIN SCALES - GENERAL: PAINLEVEL: NO PAIN (0)

## 2024-05-07 ASSESSMENT — PATIENT HEALTH QUESTIONNAIRE - PHQ9
10. IF YOU CHECKED OFF ANY PROBLEMS, HOW DIFFICULT HAVE THESE PROBLEMS MADE IT FOR YOU TO DO YOUR WORK, TAKE CARE OF THINGS AT HOME, OR GET ALONG WITH OTHER PEOPLE: NOT DIFFICULT AT ALL
SUM OF ALL RESPONSES TO PHQ QUESTIONS 1-9: 4
SUM OF ALL RESPONSES TO PHQ QUESTIONS 1-9: 4

## 2024-05-07 NOTE — PATIENT INSTRUCTIONS
Stop aspirin and turmeric forever (can cause some bleeding)  Cut back to 1 drink per day  Increase water intake  Ok to increase trazodone (for sleep) to 1.5 tablets before bed  Labs today to check iron  Appointment with surgeon to talk about stomach scope (to check for bleeding ulcer)  Increase prilosec (omeprazole) to twice a day to protect stomach  Radiology will call for kidney ultrasound  Nephrology (kidney specialist) from [facility] will call for appointment  Cut down on acetaminophen 2 tablets 3x/day  Consider 'biofreeze' roll on over the counter for hip pain as needed  Re-start iron pills just once a day for a month

## 2024-05-07 NOTE — PROGRESS NOTES
Preoperative Evaluation  [facility] [place]  [address] [place] [address]  Phone: [phone]  Primary Provider: Judy Herrera  Pre-op Performing Provider: JUDY HERRERA  May 7, 2024   Oswald is a 73 year old, presenting for the following:  Pre-Op Exam        5/7/2024    11:31 AM   Additional Questions   Roomed by Jese Riddle   Accompanied by Self         5/7/2024    11:31 AM   Patient Reported Additional Medications   Patient reports taking the following new medications none     Surgical Information  Surgery/Procedure: Left Hip Replacement   Surgery Location: [facility]   Surgeon: Dr. Hurtado   Surgery Date: 5/16/2024  Time of Surgery: Unknown   Where patient plans to recover: At home with family  Fax number for surgical facility: Note does not need to be faxed, will be available electronically in Epic.    Assessment & Plan     The proposed surgical procedure is considered INTERMEDIATE risk.  Preop general physical exam  Surgery on hold until anemia worked up  - CBC with platelets; Future  - Comprehensive metabolic panel; Future  - EKG 12-lead complete w/read - (Clinic Performed)    Other secondary osteoarthritis of left hip  DJD, taking acetaminophen  - CBC with platelets; Future  - Comprehensive metabolic panel; Future  - EKG 12-lead complete w/read - (Clinic Performed)    Essential hypertension, benign    - US Renal Complete Non-Vascular; Future  - Adult Nephrology  Referral    Anemia of unknown etiology  Concern for upper GI bleed, mild, but history of ASA and turmeric use, etoh use and +melena stools on occasion  - Lab Blood Morphology Pathologist Review; Future  - Iron and iron binding capacity  - Ferritin  - Adult General Surg Referral    Other insomnia  Increase to 75 mg daily  - traZODone (DESYREL) 50 MG tablet; Take 1.5 tablets (75 mg) by mouth at bedtime    Stage 3 chronic kidney disease, unspecified whether stage 3a or 3b CKD (H)  Will get ultrasound and  renal consult, non-urgent  - US Renal Complete Non-Vascular; Future  - Adult Nephrology  Referral    Possible Sleep Apnea: snores loudly      Risks and Recommendations  The patient has the following additional risks and recommendations for perioperative complications:  Anemia/Bleeding/Clotting:    - Anemia and requires treatment prior to surgery. Needs   EGD as + ROS for melena    Antiplatelet or Anticoagulation Medication Instructions   - aspirin: Discontinue aspirin 7-10 days prior to procedure to reduce bleeding risk. It should be resumed postoperatively.     Additional Medication Instructions  Surgery on hold    Recommendation  Surgery is NOT recommended due to severe anemia that must be corrected prior to elective surgery. Anemia treatment plan: EGD as ROS positive for melena    45 minutes spent by me on the date of the encounter doing chart review, history and exam, documentation and further activities per the note    Subjective   HPI related to upcoming procedure: left hip pain worsening, and injections not helpful          5/7/2024    11:30 AM   Preop Questions   1. Have you ever had a heart attack or stroke? No   2. Have you ever had surgery on your heart or blood vessels, such as a stent placement, a coronary artery bypass, or surgery on an artery in your head, neck, heart, or legs? No   3. Do you have chest pain with activity? No   4. Do you have a history of  heart failure? No   5. Do you currently have a cold, bronchitis or symptoms of other infection? No   6. Do you have a cough, shortness of breath, or wheezing? YES - States he has a little bit of shortness of breath. Has the start of COPD    7. Do you or anyone in your family have previous history of blood clots? No   8. Do you or does anyone in your family have a serious bleeding problem such as prolonged bleeding following surgeries or cuts? No   9. Have you ever had problems with anemia or been told to take iron pills? UNKNOWN - States he  thinks so, but is not sure    10. Have you had any abnormal blood loss such as black, tarry or bloody stools? No   11. Have you ever had a blood transfusion? No   12. Are you willing to have a blood transfusion if it is medically needed before, during, or after your surgery? Yes   13. Have you or any of your relatives ever had problems with anesthesia? No   14. Do you have sleep apnea, excessive snoring or daytime drowsiness? UNKNOWN - Has a hard time sleeping, but is unsure.    15. Do you have any artifical heart valves or other implanted medical devices like a pacemaker, defibrillator, or continuous glucose monitor? No   16. Do you have artificial joints? YES - right shoulder replacement and right hip    17. Are you allergic to latex? No     Health Care Directive  Patient does not have a Health Care Directive or Living Will: Discussed advance care planning with patient; information given to patient to review.    Preoperative Review of    reviewed - no record of controlled substances prescribed.    Status of Chronic Conditions:  ANEMIA - Patient has a recent history of moderate-severe anemia, which has been symptomatic. Work up to date has revealed -- TBD, gen surgery appt scheduled. Treatment has been iron supplements.     COPD - Patient has a longstanding history of moderate-severe COPD . Patient has been doing well overall noting SOB and continues on medication regimen consisting of n/a without adverse reactions or side effects.    DEPRESSION - Patient has a long history of Depression of moderate severity requiring medication for control with recent symptoms being stable..Current symptoms of depression include none.     HYPERTENSION - Patient has longstanding history of HTN , currently denies any symptoms referable to elevated blood pressure. Specifically denies chest pain, palpitations, dyspnea, orthopnea, PND or peripheral edema. Blood pressure readings have been in normal range. Current medication  regimen is as listed below. Patient denies any side effects of medication.     RENAL INSUFFICIENCY - Patient has a longstanding history of moderate-severe chronic renal insufficiency. Last Cr 1.38.     SLEEP PROBLEM - Patient has a longstanding history of insomnia.. Patient has tried OTC medications with limited success.     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 09/13/2023     Priority: Medium    Other polyneuropathy 06/08/2023     Priority: Medium    Puncture wound of skin from metal nail 04/10/2023     Priority: Medium    Vitamin B12 deficiency (non anemic) 02/08/2023     Priority: Medium    SILVIA (generalized anxiety disorder) 12/12/2022     Priority: Medium    Anemia of unknown etiology 10/31/2022     Priority: Medium    Screening for prostate cancer 06/10/2022     Priority: Medium    Atypical chest pain 06/10/2022     Priority: Medium    Bilateral lower extremity pain 04/20/2022     Priority: Medium    Non-traumatic rhabdomyolysis 04/06/2022     Priority: Medium    Paresthesias 04/06/2022     Priority: Medium    * * * SBE PROPHYLAXIS * * * 03/23/2022     Priority: Medium    Other insomnia 03/23/2022     Priority: Medium    Pure hypercholesterolemia 03/23/2022     Priority: Medium    Hypovitaminosis D 03/23/2022     Priority: Medium    Iron deficiency 03/23/2022     Priority: Medium    Screen for colon cancer 06/29/2021     Priority: Medium     Added automatically from request for surgery [id number]      Tingling 04/28/2021     Priority: Medium    History of tobacco abuse 04/28/2021     Priority: Medium    TENORIO (dyspnea on exertion) 04/28/2021     Priority: Medium    History of [facility] heart [facility] to [facility]  04/28/2021     Priority: Medium    Coronary artery disease involving native coronary artery of native heart  without angina pectoris 04/28/2021     Priority: Medium    History of COVID-19 on 11/20/2020 04/28/2021     Priority: Medium    Regular alcohol consumption 04/28/2021     Priority: Medium    Hypertriglyceridemia 04/28/2021     Priority: Medium    Colon cancer screening 03/25/2021     Priority: Medium     Added automatically from request for surgery [id number]      ACP (advance care planning) 08/10/2016     Priority: Medium     Advance Care Planning 8/10/2016: ACP Review of Chart / Resources Provided:  Reviewed chart for advance care plan.  Oswald CECI Goel has no plan or code status on file. Discussed available resources and provided with information. Confirmed code status reflects current choices pending further ACP discussions.  Confirmed/documented legally designated decision makers.  Added by YOANDY NASH            Primary localized osteoarthrosis, pelvic region and thigh 01/20/2015     Priority: Medium    Advanced care planning/counseling discussion 03/30/2012     Priority: Medium    Dysthymic disorder 02/22/2011     Priority: Medium    Essential hypertension, benign 02/22/2011     Priority: Medium    RBBB 02/22/2011     Priority: Medium    Nonallopathic lesion of cervical region 08/27/2008     Priority: Medium     Problem list name updated by automated process. Provider to review      Hypertrophy of prostate without urinary obstruction 10/10/2007     Priority: Medium     Problem list name updated by automated process. Provider to review      Mixed hyperlipidemia 12/29/1999     Priority: Medium      Past Medical History:   Diagnosis Date    COPD (chronic obstructive pulmonary disease) (H) 2021    minimal on PFTs, due to absence of overinflation    Depressive disorder, not elsewhere classified 02/22/2011    Hypertension, Benign 02/22/2011    Hypertrophy (benign) of prostate without urinary o 10/10/2007    Nonallopathic lesion of cervical region, not elsewhere classified 08/27/2008    Pure hypercholesterolemia  12/29/1999    RBBB 02/22/2011     Past Surgical History:   Procedure Laterality Date    COLONOSCOPY  03/01/2011    repeat 10 yrs    COLONOSCOPY  01/01/2005    COLONOSCOPY N/A 07/29/2021    Procedure: Colonoscopy;  Surgeon: Eh Villa MD;  Location: [facility] OR    HERNIA REPAIR N/A 1998    unknown    left arthroscopy Left 1968    knee -- Dr Love    RELEASE CARPAL TUNNEL Bilateral     Dr Gonsalez    right shoulder replacement Right     Dr Trinh    right total hip replacement N/A     Dr Hurtado    TONSILLECTOMY      TRANSPOSITION ULNAR NERVE (ELBOW) Left 11/19/2015    Procedure: TRANSPOSITION ULNAR NERVE (ELBOW);  Surgeon: Mahesh Capps MD;  Location: [facility] OR     Current Outpatient Medications   Medication Sig Dispense Refill    acetaminophen (TYLENOL) 500 MG tablet Take 500-1,000 mg by mouth every 6 hours as needed for mild pain      allopurinol (ZYLOPRIM) 100 MG tablet Take 2 tablets (200 mg) by mouth daily 180 tablet 2    amLODIPine (NORVASC) 10 MG tablet TAKE 1 TABLET BY MOUTH ONCE DAILY. 90 tablet 1    aspirin (ASA) 81 MG chewable tablet Take 81 mg by mouth daily      chlorthalidone (HYGROTON) 25 MG tablet TAKE 1 TABLET BY MOUTH ONCE DAILY. 90 tablet 3    citalopram (CELEXA) 20 MG tablet TAKE 1 TABLET BY MOUTH ONCE DAILY. 90 tablet 3    colchicine (COLCRYS) 0.6 MG tablet Take 2 tablets x 1 and then 1 tablet one hour later, repeat on day 2 if needed 6 tablet 0    cyanocobalamin (VITAMIN B-12) 500 MCG SUBL sublingual tablet Place 500 mcg under the tongue daily      fish oil-omega-3 fatty acids 1000 MG capsule Take 1 g by mouth daily      lisinopril (ZESTRIL) 20 MG tablet TAKE 1 TABLET BY MOUTH ONCE DAILY. 90 tablet 0    Milk Thistle-Dand-Fennel-Licor (MILK THISTLE XTRA) CAPS capsule       montelukast (SINGULAIR) 10 MG tablet TAKE ONE TABLET BY MOUTH AT BEDTIME. 90 tablet 3    Multiple Vitamin (DAILY MULTIVITAMIN PO) Take 1 tablet by Oral route every day with food      omeprazole (PRILOSEC) 40 MG DR capsule  "Take 1 capsule (40 mg) by mouth daily 30 capsule 0    simvastatin (ZOCOR) 20 MG tablet TAKE ONE TABLET BY MOUTH AT BEDTIME. 90 tablet 2    traZODone (DESYREL) 50 MG tablet Take 1 tablet (50 mg) by mouth at bedtime 90 tablet 2    Turmeric 500 MG TABS       vitamin C (ASCORBIC ACID) 1000 MG TABS Take 1,000 mg by mouth daily      Vitamin D3 (CHOLECALCIFEROL) 25 mcg (1000 units) tablet Take 3 tablets by mouth daily         Allergies   Allergen Reactions    Animal Dander      Deer hair        Social History     Tobacco Use    Smoking status: Former     Current packs/day: 0.00     Average packs/day: 0.1 packs/day for 2.0 years (0.2 ttl pk-yrs)     Types: Cigarettes     Start date:      Quit date:      Years since quittin.3    Smokeless tobacco: Former    Tobacco comments:     no passive exposure, tried to quit-yes   Substance Use Topics    Alcohol use: Yes     Comment: daily      Family History   Problem Relation Age of Onset    Heart Disease Mother         CABG    Pancreatic Cancer Mother 81    Other - See Comments Father 84    Family History Negative Sister     Family History Negative Sister     Unknown/Adopted Maternal Grandmother     Unknown/Adopted Maternal Grandfather     Unknown/Adopted Paternal Grandmother     Unknown/Adopted Paternal Grandfather      History   Drug Use No         Review of Systems    Review of Systems  Constitutional, HEENT, cardiovascular, pulmonary, GI, , musculoskeletal, neuro, skin, endocrine and psych systems are negative, except as otherwise noted.    Objective    /60 (BP Location: Right arm, Patient Position: Sitting, Cuff Size: Adult Regular)   Pulse 57   Temp 97.2  F (36.2  C) (Tympanic)   Wt 94 kg (207 lb 4.8 oz)   SpO2 96%   BMI 33.46 kg/m     Estimated body mass index is 33.46 kg/m  as calculated from the following:    Height as of 1/15/24: 1.676 m (5' 6\").    Weight as of this encounter: 94 kg (207 lb 4.8 oz).  Physical Exam  GENERAL: alert and no " distress  EYES: Eyes grossly normal to inspection, PERRL and conjunctivae and sclerae normal  HENT: ear canals and TM's normal, nose and mouth without ulcers or lesions  NECK: no adenopathy, no asymmetry, masses, or scars  RESP: lungs clear to auscultation - no rales, rhonchi or wheezes  CV: regular rate and rhythm, normal S1 S2, no S3 or S4, no murmur, click or rub, no peripheral edema  ABDOMEN: soft, nontender, no hepatosplenomegaly, no masses and bowel sounds normal  MS: no gross musculoskeletal defects noted, no edema  SKIN: no suspicious lesions or rashes  NEURO: Normal strength and tone, mentation intact and speech normal  PSYCH: mentation appears normal, affect normal/bright    Recent Labs   Lab Test 01/15/24  0956 09/13/23  1056 04/10/23  0956 11/16/22  1019 10/31/22  1429   HGB  --   --   --  13.6 12.5*   PLT  --   --   --  242 223    138   < >  --  138   POTASSIUM 4.0 5.0   < >  --  3.7   CR 1.08 1.22*   < >  --  1.07    < > = values in this interval not displayed.        Diagnostics  Recent Results (from the past 24 hour(s))   CBC with platelets    Collection Time: 05/07/24 11:19 AM   Result Value Ref Range    WBC Count 7.3 4.0 - 11.0 10e3/uL    RBC Count 3.62 (L) 4.40 - 5.90 10e6/uL    Hemoglobin 11.7 (L) 13.3 - 17.7 g/dL    Hematocrit 35.6 (L) 40.0 - 53.0 %    MCV 98 78 - 100 fL    MCH 32.3 26.5 - 33.0 pg    MCHC 32.9 31.5 - 36.5 g/dL    RDW 13.9 10.0 - 15.0 %    Platelet Count 237 150 - 450 10e3/uL   Comprehensive metabolic panel    Collection Time: 05/07/24 11:19 AM   Result Value Ref Range    Sodium 138 135 - 145 mmol/L    Potassium 4.3 3.4 - 5.3 mmol/L    Carbon Dioxide (CO2) 24 22 - 29 mmol/L    Anion Gap 11 7 - 15 mmol/L    Urea Nitrogen 28.9 (H) 8.0 - 23.0 mg/dL    Creatinine 1.38 (H) 0.67 - 1.17 mg/dL    GFR Estimate 53 (L) >60 mL/min/1.73m2    Calcium 9.7 8.8 - 10.2 mg/dL    Chloride 103 98 - 107 mmol/L    Glucose 100 (H) 70 - 99 mg/dL    Alkaline Phosphatase 124 40 - 150 U/L    AST 32 0  - 45 U/L    ALT 25 0 - 70 U/L    Protein Total 7.9 6.4 - 8.3 g/dL    Albumin 4.3 3.5 - 5.2 g/dL    Bilirubin Total 0.7 <=1.2 mg/dL   EKG 12-lead complete w/read - (Clinic Performed)    Collection Time: 05/07/24 11:48 AM   Result Value Ref Range    Systolic Blood Pressure  mmHg    Diastolic Blood Pressure  mmHg    Ventricular Rate 54 BPM    Atrial Rate 54 BPM    NH Interval 166 ms    QRS Duration 146 ms     ms    QTc 440 ms    P Axis 44 degrees    R AXIS 40 degrees    T Axis 9 degrees    Interpretation ECG       Sinus bradycardia  Right bundle branch block  Abnormal ECG  No previous ECGs available        EKG: sinus bradycardia, normal axis, normal intervals, no acute ST/T changes c/w ischemia, no LVH by voltage criteria, Right Bundle Branch Block, unchanged from previous tracings    Revised Cardiac Risk Index (RCRI)  The patient has the following serious cardiovascular risks for perioperative complications:   - No serious cardiac risks = 0 points     RCRI Interpretation: 0 points: Class I (very low risk - 0.4% complication rate)         Signed Electronically by: Meche Dougherty MD  Copy of this evaluation report is provided to requesting physician.      Answers submitted by the patient for this visit:  Patient Health Questionnaire (Submitted on 5/7/2024)  If you checked off any problems, how difficult have these problems made it for you to do your work, take care of things at home, or get along with other people?: Not difficult at all  PHQ9 TOTAL SCORE: 4  SILVIA-7 (Submitted on 5/7/2024)  SILVIA 7 TOTAL SCORE: 1

## 2024-05-08 ENCOUNTER — TELEPHONE (OUTPATIENT)
Dept: FAMILY MEDICINE | Facility: OTHER | Age: 74
End: 2024-05-08

## 2024-05-08 NOTE — TELEPHONE ENCOUNTER
Faxed notes to [facility] at [phone] that pt is not cleared for surgery on 5/16.

## 2024-05-09 ENCOUNTER — HOSPITAL ENCOUNTER (OUTPATIENT)
Dept: ULTRASOUND IMAGING | Facility: HOSPITAL | Age: 74
Discharge: HOME | End: 2024-05-09
Attending: FAMILY MEDICINE | Admitting: FAMILY MEDICINE
Payer: COMMERCIAL

## 2024-05-09 DIAGNOSIS — N18.30 STAGE 3 CHRONIC KIDNEY DISEASE, UNSPECIFIED WHETHER STAGE 3A OR 3B CKD (H): ICD-10-CM

## 2024-05-09 DIAGNOSIS — I10 ESSENTIAL HYPERTENSION, BENIGN: ICD-10-CM

## 2024-05-09 LAB
PATH REPORT.COMMENTS IMP SPEC: NORMAL
PATH REPORT.FINAL DX SPEC: NORMAL
PATH REPORT.MICROSCOPIC SPEC OTHER STN: NORMAL
PATH REPORT.MICROSCOPIC SPEC OTHER STN: NORMAL

## 2024-05-09 PROCEDURE — 76770 US EXAM ABDO BACK WALL COMP: CPT

## 2024-05-10 ENCOUNTER — TELEPHONE (OUTPATIENT)
Dept: FAMILY MEDICINE | Facility: OTHER | Age: 74
End: 2024-05-10

## 2024-05-10 NOTE — TELEPHONE ENCOUNTER
11:50 AM    Reason for Call: Phone Call    Description: Patient states returning a missed call - writer does not see documented call.     Was an appointment offered for this call? No  If yes : Appointment type              Date    Preferred method for responding to this message: Telephone Call  What is your phone number? [phone] - Cell    If we cannot reach you directly, may we leave a detailed response at the number you provided? Yes    Can this message wait until your PCP/provider returns, if available today? Not applicable    Lurdes Obrien

## 2024-05-13 NOTE — TELEPHONE ENCOUNTER
Results requested: lab results     Best number to reach patient at: [phone]     Best time to call patient: Anytime    Send to care team in basket.

## 2024-05-13 NOTE — TELEPHONE ENCOUNTER
Writer called and updated patient with results. Patient verbalized understanding and had no further questions.

## 2024-05-14 ENCOUNTER — OFFICE VISIT (OUTPATIENT)
Dept: SURGERY | Facility: OTHER | Age: 74
End: 2024-05-14
Attending: FAMILY MEDICINE
Payer: COMMERCIAL

## 2024-05-14 ENCOUNTER — PREP FOR PROCEDURE (OUTPATIENT)
Dept: SURGERY | Facility: OTHER | Age: 74
End: 2024-05-14

## 2024-05-14 VITALS
DIASTOLIC BLOOD PRESSURE: 64 MMHG | WEIGHT: 207 LBS | SYSTOLIC BLOOD PRESSURE: 118 MMHG | TEMPERATURE: 97.1 F | RESPIRATION RATE: 18 BRPM | HEIGHT: 66 IN | HEART RATE: 65 BPM | BODY MASS INDEX: 33.27 KG/M2 | OXYGEN SATURATION: 98 %

## 2024-05-14 DIAGNOSIS — D64.9 ANEMIA: Primary | ICD-10-CM

## 2024-05-14 DIAGNOSIS — K92.1 MELENA: ICD-10-CM

## 2024-05-14 DIAGNOSIS — Z86.0100 HISTORY OF COLONIC POLYPS: ICD-10-CM

## 2024-05-14 DIAGNOSIS — R19.7 DIARRHEA, UNSPECIFIED TYPE: ICD-10-CM

## 2024-05-14 DIAGNOSIS — R19.7 DIARRHEA: ICD-10-CM

## 2024-05-14 DIAGNOSIS — D64.9 ANEMIA OF UNKNOWN ETIOLOGY: Primary | ICD-10-CM

## 2024-05-14 PROCEDURE — G0463 HOSPITAL OUTPT CLINIC VISIT: HCPCS

## 2024-05-14 PROCEDURE — 99213 OFFICE O/P EST LOW 20 MIN: CPT | Performed by: NURSE PRACTITIONER

## 2024-05-14 PROCEDURE — G0463 HOSPITAL OUTPT CLINIC VISIT: HCPCS | Mod: 25

## 2024-05-14 RX ORDER — BISACODYL 5 MG/1
5 TABLET, DELAYED RELEASE ORAL DAILY PRN
Qty: 4 TABLET | Refills: 0 | Status: SHIPPED | OUTPATIENT
Start: 2024-05-14 | End: 2024-06-11

## 2024-05-14 ASSESSMENT — PAIN SCALES - GENERAL: PAINLEVEL: EXTREME PAIN (8)

## 2024-05-14 NOTE — PATIENT INSTRUCTIONS
Thank you for allowing Lynne Gamez CNP and our surgical team to participate in your care. Please call our health unit coordinator at [phone] with scheduling questions or the nurse at [phone] with any other questions or concerns.      You have been scheduled for:  upper endoscopy and colonoscopy with  on 5/22/24.   You will use golytely bowel prep.  Please see handout for additional instruction.  You will not need a pre-operative appointment with your primary care provider.  You may call [phone] or [phone] with any questions.

## 2024-05-14 NOTE — PROGRESS NOTES
CLINIC NOTE - CONSULT  5/14/2024    Patient : Oswald Goel    Referring Physician :  Dr. Dougherty    Reason for Referral : Upper and lower endoscopy    This is a 73 year old male with a need for an upper and lower endoscopy.  Upper endoscopy is needed for  anemia, melena .  Lower endoscopy is needed for Anemia and History of Colon Polyps.      Last EGD : years  History of GERD : NO  History of PUD : NO  On PPI's :  yes, prilosec 40 mg bid  History of dysphagia : NO  Hematemesis : NO  Melena : NO    Last colonoscopy : 3 years ago  Family history of colon cancer : NO  Family history of colon polyps : NO  Personal history of colon cancer : NO  Personal history of colon polyps : YES  Rectal bleeding : NO  Changes in bowel habits :YES--diarrhea  Personal history of inflammatory bowel disease : NO    Past Medical History:  Past Medical History:   Diagnosis Date    COPD (chronic obstructive pulmonary disease) (H) 2021    minimal on PFTs, due to absence of overinflation    Depressive disorder, not elsewhere classified 02/22/2011    Hypertension, Benign 02/22/2011    Hypertrophy (benign) of prostate without urinary o 10/10/2007    Nonallopathic lesion of cervical region, not elsewhere classified 08/27/2008    Pure hypercholesterolemia 12/29/1999    RBBB 02/22/2011       Past Surgical History:  Past Surgical History:   Procedure Laterality Date    COLONOSCOPY  03/01/2011    repeat 10 yrs    COLONOSCOPY  01/01/2005    COLONOSCOPY N/A 07/29/2021    Procedure: Colonoscopy;  Surgeon: Eh Villa MD;  Location: [facility]    HERNIA REPAIR N/A 1998    unknown    left arthroscopy Left 1968    knee -- Dr Love    RELEASE CARPAL TUNNEL Bilateral     Dr Gonsalez    right shoulder replacement Right     Dr Trinh    right total hip replacement N/A     Dr Hurtado    TONSILLECTOMY      TRANSPOSITION ULNAR NERVE (ELBOW) Left 11/19/2015    Procedure: TRANSPOSITION ULNAR NERVE (ELBOW);  Surgeon: Mahesh Capps MD;  Location: [facility]        Family History History:  Family History   Problem Relation Age of Onset    Heart Disease Mother         CABG    Pancreatic Cancer Mother 81    Other - See Comments Father 84    Family History Negative Sister     Family History Negative Sister     Unknown/Adopted Maternal Grandmother     Unknown/Adopted Maternal Grandfather     Unknown/Adopted Paternal Grandmother     Unknown/Adopted Paternal Grandfather        History of Tobacco Use:  History   Smoking Status    Former    Types: Cigarettes   Smokeless Tobacco    Former       Current Medications:  Current Outpatient Medications   Medication Sig Dispense Refill    acetaminophen (TYLENOL) 500 MG tablet Take 500-1,000 mg by mouth every 6 hours as needed for mild pain      allopurinol (ZYLOPRIM) 100 MG tablet Take 2 tablets (200 mg) by mouth daily 180 tablet 2    amLODIPine (NORVASC) 10 MG tablet TAKE 1 TABLET BY MOUTH ONCE DAILY. 90 tablet 1    chlorthalidone (HYGROTON) 25 MG tablet TAKE 1 TABLET BY MOUTH ONCE DAILY. 90 tablet 3    citalopram (CELEXA) 20 MG tablet TAKE 1 TABLET BY MOUTH ONCE DAILY. 90 tablet 3    colchicine (COLCRYS) 0.6 MG tablet Take 2 tablets x 1 and then 1 tablet one hour later, repeat on day 2 if needed 6 tablet 0    cyanocobalamin (VITAMIN B-12) 500 MCG SUBL sublingual tablet Place 500 mcg under the tongue daily      fish oil-omega-3 fatty acids 1000 MG capsule Take 1 g by mouth daily      lisinopril (ZESTRIL) 20 MG tablet TAKE 1 TABLET BY MOUTH ONCE DAILY. 90 tablet 0    Milk Thistle-Dand-Fennel-Licor (MILK THISTLE XTRA) CAPS capsule       montelukast (SINGULAIR) 10 MG tablet TAKE ONE TABLET BY MOUTH AT BEDTIME. 90 tablet 3    Multiple Vitamin (DAILY MULTIVITAMIN PO) Take 1 tablet by Oral route every day with food      omeprazole (PRILOSEC) 40 MG DR capsule Take 1 capsule (40 mg) by mouth 2 times daily 180 capsule 1    simvastatin (ZOCOR) 20 MG tablet TAKE ONE TABLET BY MOUTH AT BEDTIME. 90 tablet 2    traZODone (DESYREL) 50 MG tablet Take  "1.5 tablets (75 mg) by mouth at bedtime 135 tablet 2    vitamin C (ASCORBIC ACID) 1000 MG TABS Take 1,000 mg by mouth daily      Vitamin D3 (CHOLECALCIFEROL) 25 mcg (1000 units) tablet Take 3 tablets by mouth daily         Allergies:  Allergies   Allergen Reactions    Animal Dander      Deer hair       ROS:  Constitutional: negative  Eyes: negative  Ears, nose, mouth, throat, and face: negative  Respiratory: positive for COPD  Cardiovascular: negative  Gastrointestinal: positive for melena, diarrhea, and anemia, history of colon polyps  Genitourinary:negative  Integument/breast: positive for pruritus  Hematologic/lymphatic: positive for anemia  Musculoskeletal:positive for hip pain/problems, back pain  Neurological: negative  Behavioral/Psych: negative  Endocrine: negative  Allergic/Immunologic: negative    PHYSICAL EXAM:     Vital signs: /64   Pulse 65   Temp 97.1  F (36.2  C)   Resp 18   Ht 1.676 m (5' 6\")   Wt 93.9 kg (207 lb)   SpO2 98%   BMI 33.41 kg/m     BMI: Body mass index is 33.41 kg/m .   General: Normal, healthy, cooperative, in no acute distress, alert   Skin: no rashes   Lungs: clear to auscultation   CV: Regular rate and rhythm    Abdominal: non-distended, soft   Extremities: No cyanosis, clubbing or edema noted bilaterally in Upper and Lower Extremities   Neurological: without deficit    Assessment:   73 year old male with need for upper endoscopy for Melena and anemia  and lower endoscopy for Anemia, History of Colon Polyps, and Change in Bowel Habits (diarrhea):    Plan:   Will schedule an esophagogastroduodenoscopy and colonoscopy with biopsies.  The procedures with their risks, benefits and alternatives were explained.  Risks include but are not limited to bleeding, perforation, missing lesions, need for additional procedures, reaction to anesthesia.  All the patients questions were answered.  The patient consents to proceed.  The procedures will be scheduled.     "

## 2024-05-15 ENCOUNTER — PATIENT OUTREACH (OUTPATIENT)
Dept: GASTROENTEROLOGY | Facility: CLINIC | Age: 74
End: 2024-05-15

## 2024-05-20 ENCOUNTER — ANESTHESIA EVENT (OUTPATIENT)
Dept: SURGERY | Facility: HOSPITAL | Age: 74
End: 2024-05-20
Payer: COMMERCIAL

## 2024-05-20 ASSESSMENT — LIFESTYLE VARIABLES: TOBACCO_USE: 1

## 2024-05-20 ASSESSMENT — COPD QUESTIONNAIRES: COPD: 1

## 2024-05-20 NOTE — ANESTHESIA PREPROCEDURE EVALUATION
Anesthesia Pre-Procedure Evaluation    Patient: Oswald Goel   MRN: [id number] : 1950        Procedure : Procedure(s):  Upper endoscopy and colonoscopy with biopsies          Past Medical History:   Diagnosis Date    COPD (chronic obstructive pulmonary disease) (H)     minimal on PFTs, due to absence of overinflation    Depressive disorder, not elsewhere classified 2011    Hypertension, Benign 2011    Hypertrophy (benign) of prostate without urinary o 10/10/2007    Nonallopathic lesion of cervical region, not elsewhere classified 2008    Pure hypercholesterolemia 1999    RBBB 2011      Past Surgical History:   Procedure Laterality Date    COLONOSCOPY  2011    repeat 10 yrs    COLONOSCOPY  2005    COLONOSCOPY N/A 2021    Procedure: Colonoscopy;  Surgeon: Eh Villa MD;  Location: [facility]    HERNIA REPAIR N/A     unknown    left arthroscopy Left     knee -- Dr Love    RELEASE CARPAL TUNNEL Bilateral     Dr Gonsalez    right shoulder replacement Right     Dr Trinh    right total hip replacement N/A     Dr Hurtado    TONSILLECTOMY      TRANSPOSITION ULNAR NERVE (ELBOW) Left 2015    Procedure: TRANSPOSITION ULNAR NERVE (ELBOW);  Surgeon: Mahesh Capps MD;  Location: [facility]      Allergies   Allergen Reactions    Animal Dander      Deer hair      Social History     Tobacco Use    Smoking status: Former     Current packs/day: 0.00     Average packs/day: 0.1 packs/day for 2.0 years (0.2 ttl pk-yrs)     Types: Cigarettes     Start date:      Quit date:      Years since quittin.4    Smokeless tobacco: Former    Tobacco comments:     no passive exposure, tried to quit-yes   Substance Use Topics    Alcohol use: Yes     Comment: daily       Wt Readings from Last 1 Encounters:   24 93.9 kg (207 lb)        Anesthesia Evaluation   Pt has had prior anesthetic. Type: General and MAC.    No history of anesthetic complications        ROS/MED HX  ENT/Pulmonary:     (+)     BISI risk factors, snores loudly, hypertension,    allergic rhinitis,     tobacco use, Past use,         COPD,              Neurologic:     (+)    peripheral neuropathy, - both feet.                           Cardiovascular:     (+) Dyslipidemia hypertension- -  CAD -  - -           TENORIO.                      Previous cardiac testing   Echo: Date: 3/2021 Results:  No pericardial effusion is present.  Global and regional left ventricular function is normal with an EF of 60-65%.  Mild concentric wall thickening consistent with left ventricular hypertrophy  is present.  The right ventricle is normal size.  Both atria appear normal.  The mitral valve is normal.  Mild to moderate aortic insufficiency is present.  The pulmonic valve is normal.  The tricuspid valve is normal.  The aorta root is normal.  Left ventricular size is normal.  ______________________________________________________________________________  Stress Test:  Date: 05/03/21 Results:  EF 60%  ECG Reviewed:  Date:  Results:   sinus bradycardia, normal axis, normal intervals, no acute ST/T changes c/w ischemia, no LVH by voltage criteria, Right Bundle Branch Block, unchanged from previous tracings  Cath:  Date: Results:      METS/Exercise Tolerance: 4 - Raking leaves, gardening    Hematologic:     (+)      anemia,          Musculoskeletal: Comment: Osteoarthritis  (+)  arthritis,             GI/Hepatic:     (+) GERD, Asymptomatic on medication,      bowel prep,            Renal/Genitourinary:     (+) renal disease, type: CRI,      BPH,      Endo:     (+)               Obesity,       Psychiatric/Substance Use: Comment: Daily EtOH  1-2 beer/day    (+) psychiatric history depression alcohol abuse  Recreational drug usage: Cannabis (last year cookie).    Infectious Disease:  - neg infectious disease ROS     Malignancy:  - neg malignancy ROS     Other:  - neg other ROS          Physical Exam    Airway       "  Mallampati: I   TM distance: > 3 FB   Neck ROM: limited   Mouth opening: > 3 cm    Respiratory Devices and Support         Dental     Comment: Caps on the front teeth    (+) Multiple crowns, permanant bridges      Cardiovascular   cardiovascular exam normal       Rhythm and rate: regular and normal     Pulmonary   pulmonary exam normal        breath sounds clear to auscultation           OUTSIDE LABS:  CBC:   Lab Results   Component Value Date    WBC 7.3 05/07/2024    WBC 7.5 05/07/2024    HGB 11.7 (L) 05/07/2024    HGB 11.7 (L) 05/07/2024    HCT 35.6 (L) 05/07/2024    HCT 35.5 (L) 05/07/2024     05/07/2024     05/07/2024     BMP:   Lab Results   Component Value Date     05/07/2024     01/15/2024    POTASSIUM 4.3 05/07/2024    POTASSIUM 4.0 01/15/2024    CHLORIDE 103 05/07/2024    CHLORIDE 108 (H) 01/15/2024    CO2 24 05/07/2024    CO2 24 01/15/2024    BUN 28.9 (H) 05/07/2024    BUN 26.3 (H) 01/15/2024    CR 1.38 (H) 05/07/2024    CR 1.08 01/15/2024     (H) 05/07/2024    GLC 99 01/15/2024     COAGS:   Lab Results   Component Value Date    INR 1.00 02/21/2017     POC: No results found for: \"BGM\", \"HCG\", \"HCGS\"  HEPATIC:   Lab Results   Component Value Date    ALBUMIN 4.3 05/07/2024    PROTTOTAL 7.9 05/07/2024    ALT 25 05/07/2024    AST 32 05/07/2024    ALKPHOS 124 05/07/2024    BILITOTAL 0.7 05/07/2024     OTHER:   Lab Results   Component Value Date    A1C 5.4 04/28/2021    MYA 9.7 05/07/2024    MAG 1.8 10/31/2022    TSH 1.06 10/31/2022    CRP 1.8 04/28/2021    SED 33 (H) 05/22/2015       Anesthesia Plan    ASA Status:  3    NPO Status:  NPO Appropriate    Anesthesia Type: MAC.     - Reason for MAC: straight local not clinically adequate   Induction: Intravenous, Propofol.   Maintenance: Balanced.        Consents    Anesthesia Plan(s) and associated risks, benefits, and realistic alternatives discussed. Questions answered and patient/representative(s) expressed understanding.     - " "Discussed: Risks, Benefits and Alternatives for BOTH SEDATION and the PROCEDURE were discussed     - Discussed with:  Patient      - Extended Intubation/Ventilatory Support Discussed: No.      - Patient is DNR/DNI Status: No     Use of blood products discussed: No .     Postoperative Care            Comments:    Other Comments: Hp 5/7/24   Risks and benefits of MAC anesthetic discussed including dental damage, aspiration, loss of airway, conversion to general anesthetic, CV complications, MI, stroke, death. Pt wishes to proceed.            Александр Santoro, APRN CRNA    I have reviewed the pertinent notes and labs in the chart from the past 30 days and (re)examined the patient.  Any updates or changes from those notes are reflected in this note.              # Obesity: Estimated body mass index is 33.41 kg/m  as calculated from the following:    Height as of 5/14/24: 1.676 m (5' 6\").    Weight as of 5/14/24: 93.9 kg (207 lb).      "

## 2024-05-21 NOTE — DISCHARGE INSTRUCTIONS
After Anesthesia (Sleep Medicine)  What should I do after anesthesia?  You should rest and relax for the next 24 hours. Avoid risky or difficult (strenuous) activity. A responsible adult should stay with you overnight.  Don't drive or use any heavy equipment for 24 hours. Even if you feel normal, your reactions may be affected by the sleep medicine given to you.  Don't drink alcohol or make any important decisions for 24 hours.  Slowly get back to your regular diet, as you feel able.  How should I expect to feel?  It's normal to feel dizzy, light-headed, or faint for up to a full day after anesthesia or while taking pain medicine. If this happens:   Sit down for a few minutes before standing.  Have someone help you when you get up to walk or use the bathroom.  If you have nausea (feel sick to your stomach) or vomit (throw up):   Drink clear liquids (such as apple juice, ginger ale, broth, or 7UP) until you feel better.  If you feel sick to your stomach, or you keep vomiting for 24 hours, please call the doctor.  What else should I know?  You might have a dry mouth, sore throat, muscle aches, or trouble sleeping. These should go away after 24 hours.  Please contact your doctor if you have any other symptoms that concern you, such as fever, pain, bleeding, fluid drainage, swelling, or headache, or if it's been over 8 to 10 hours and you still aren't able to pee (urinate).  If you have a history of sleep apnea, it's very important to use your CPAP machine for the next 24 hours when you nap or sleep.   For informational purposes only. Not to replace the advice of your health care provider. Copyright   2023 [employer]. All rights reserved. Clinically reviewed by Dimitrios Pagan MD. [employer] [id number] - REV 09/23.    Colonoscopy: What to Expect at Home  Your Recovery  After a colonoscopy, you'll stay at the clinic until you wake up. Then you can go home. But you'll need to arrange for a ride. Your doctor  will tell you when you can eat and do your other usual activities.  Your doctor will talk to you about when you'll need your next colonoscopy. Your doctor can help you decide how often you need to be checked. This will depend on the results of your test and your risk for colorectal cancer.  After the test, you may be bloated or have gas pains. You may need to pass gas. If a biopsy was done or a polyp was removed, you may have streaks of blood in your stool (feces) for a few days. Problems such as heavy rectal bleeding may not occur until several weeks after the test. This isn't common. But it can happen after polyps are removed.  This care sheet gives you a general idea about how long it will take for you to recover. But each person recovers at a different pace. Follow the steps below to get better as quickly as possible.  How can you care for yourself at home?  Activity    Rest when you feel tired.     You can do your normal activities when it feels okay to do so.   Diet    Follow your doctor's directions for eating.     Unless your doctor has told you not to, drink plenty of fluids. This helps to replace the fluids that were lost during the colon prep.     Do not drink alcohol.   Medicines    Your doctor will tell you if and when you can restart your medicines. You will also be given instructions about taking any new medicines.     If you stopped taking aspirin or some other blood thinner, your doctor will tell you when to start taking it again.     If polyps were removed or a biopsy was done during the test, your doctor may tell you not to take aspirin or other anti-inflammatory medicines for a few days. These include ibuprofen (Advil, Motrin) and naproxen (Aleve).   Other instructions    For your safety, do not drive or operate machinery until the medicine wears off and you can think clearly. Your doctor may tell you not to drive or operate machinery until the day after your test.     Do not sign legal documents  "or make major decisions until the medicine wears off and you can think clearly. The anesthesia can make it hard for you to fully understand what you are agreeing to.   Follow-up care is a key part of your treatment and safety. Be sure to make and go to all appointments, and call your doctor if you are having problems. It's also a good idea to know your test results and keep a list of the medicines you take.  When should you call for help?   Call 911 anytime you think you may need emergency care. For example, call if:    You passed out (lost consciousness).     You pass maroon or bloody stools.     You have trouble breathing.   Call your doctor now or seek immediate medical care if:    You have pain that does not get better after you take pain medicine.     You are sick to your stomach or cannot drink fluids.     You have new or worse belly pain.     You have blood in your stools.     You have a fever.     You cannot pass stools or gas.   Watch closely for changes in your health, and be sure to contact your doctor if you have any problems.  Where can you learn more?  Go to https://www.[employer].net/patiented  Enter E264 in the search box to learn more about \"Colonoscopy: What to Expect at Home.\"  Current as of: October 25, 2023               Content Version: 14.0    3728-1237 [employer].   Care instructions adapted under license by your healthcare professional. If you have questions about a medical condition or this instruction, always ask your healthcare professional. [employer] disclaims any warranty or liability for your use of this information.    Upper GI Endoscopy: What to Expect at Home  Your Recovery  You had an upper GI endoscopy. Your doctor used a thin, lighted tube that bends to look at the inside of your esophagus, your stomach, and the first part of the small intestine, called the duodenum.  After you have an endoscopy, you will stay at the hospital or clinic for 1 to 2 " "hours. This will allow the medicine to wear off. You will be able to go home after your doctor or nurse checks to make sure that you're not having any problems.  You may have to stay overnight if you had treatment during the test. You may have a sore throat for a day or two after the test.  This care sheet gives you a general idea about what to expect after the test.  How can you care for yourself at home?  Activity   Rest as much as you need to after you go home.  You should be able to go back to your usual activities the day after the test.  Diet   Follow your doctor's directions for eating after the test.  Drink plenty of fluids (unless your doctor has told you not to).  Medications   If you have a sore throat the day after the test, use an over-the-counter spray to numb your throat.  Follow-up care is a key part of your treatment and safety. Be sure to make and go to all appointments, and call your doctor if you are having problems. It's also a good idea to know your test results and keep a list of the medicines you take.  When should you call for help?   Call 911 anytime you think you may need emergency care. For example, call if:    You passed out (lost consciousness).     You have trouble breathing.     You pass maroon or bloody stools.   Call your doctor now or seek immediate medical care if:    You have pain that does not get better after your take pain medicine.     You have new or worse belly pain.     You have blood in your stools.     You are sick to your stomach and cannot keep fluids down.     You have a fever.     You cannot pass stools or gas.   Watch closely for changes in your health, and be sure to contact your doctor if:    Your throat still hurts after a day or two.     You do not get better as expected.   Where can you learn more?  Go to https://www.healthwise.net/patiented  Enter J454 in the search box to learn more about \"Upper GI Endoscopy: What to Expect at Home.\"  Current as of: October " 19, 2023               Content Version: 14.0    0879-8017 [employer].   Care instructions adapted under license by your healthcare professional. If you have questions about a medical condition or this instruction, always ask your healthcare professional. [employer] disclaims any warranty or liability for your use of this information.

## 2024-05-22 ENCOUNTER — HOSPITAL ENCOUNTER (OUTPATIENT)
Facility: HOSPITAL | Age: 74
Discharge: HOME | End: 2024-05-22
Attending: SURGERY | Admitting: SURGERY
Payer: COMMERCIAL

## 2024-05-22 ENCOUNTER — ANESTHESIA (OUTPATIENT)
Dept: SURGERY | Facility: HOSPITAL | Age: 74
End: 2024-05-22
Payer: COMMERCIAL

## 2024-05-22 VITALS
RESPIRATION RATE: 16 BRPM | TEMPERATURE: 97.2 F | WEIGHT: 200 LBS | DIASTOLIC BLOOD PRESSURE: 63 MMHG | SYSTOLIC BLOOD PRESSURE: 115 MMHG | HEART RATE: 67 BPM | BODY MASS INDEX: 32.14 KG/M2 | HEIGHT: 66 IN | OXYGEN SATURATION: 94 %

## 2024-05-22 PROCEDURE — 250N000009 HC RX 250: Performed by: NURSE ANESTHETIST, CERTIFIED REGISTERED

## 2024-05-22 PROCEDURE — 360N000075 HC SURGERY LEVEL 2, PER MIN: Performed by: SURGERY

## 2024-05-22 PROCEDURE — 250N000011 HC RX IP 250 OP 636: Performed by: NURSE ANESTHETIST, CERTIFIED REGISTERED

## 2024-05-22 PROCEDURE — 43239 EGD BIOPSY SINGLE/MULTIPLE: CPT | Performed by: SURGERY

## 2024-05-22 PROCEDURE — 45378 DIAGNOSTIC COLONOSCOPY: CPT | Performed by: SURGERY

## 2024-05-22 PROCEDURE — 999N000141 HC STATISTIC PRE-PROCEDURE NURSING ASSESSMENT: Performed by: SURGERY

## 2024-05-22 PROCEDURE — 99100 ANES PT EXTEME AGE<1 YR&>70: CPT | Performed by: NURSE ANESTHETIST, CERTIFIED REGISTERED

## 2024-05-22 PROCEDURE — 258N000003 HC RX IP 258 OP 636: Performed by: NURSE ANESTHETIST, CERTIFIED REGISTERED

## 2024-05-22 PROCEDURE — 88305 TISSUE EXAM BY PATHOLOGIST: CPT | Mod: TC | Performed by: SURGERY

## 2024-05-22 PROCEDURE — 710N000012 HC RECOVERY PHASE 2, PER MINUTE: Performed by: SURGERY

## 2024-05-22 PROCEDURE — 88305 TISSUE EXAM BY PATHOLOGIST: CPT | Mod: 26 | Performed by: PATHOLOGY

## 2024-05-22 PROCEDURE — 370N000017 HC ANESTHESIA TECHNICAL FEE, PER MIN: Performed by: SURGERY

## 2024-05-22 PROCEDURE — 43239 EGD BIOPSY SINGLE/MULTIPLE: CPT | Performed by: NURSE ANESTHETIST, CERTIFIED REGISTERED

## 2024-05-22 PROCEDURE — 272N000001 HC OR GENERAL SUPPLY STERILE: Performed by: SURGERY

## 2024-05-22 RX ORDER — EPHEDRINE SULFATE 50 MG/ML
INJECTION, SOLUTION INTRAMUSCULAR; INTRAVENOUS; SUBCUTANEOUS PRN
Status: DISCONTINUED | OUTPATIENT
Start: 2024-05-22 | End: 2024-05-22

## 2024-05-22 RX ORDER — NALOXONE HYDROCHLORIDE 0.4 MG/ML
0.1 INJECTION, SOLUTION INTRAMUSCULAR; INTRAVENOUS; SUBCUTANEOUS
Status: DISCONTINUED | OUTPATIENT
Start: 2024-05-22 | End: 2024-05-22 | Stop reason: HOSPADM

## 2024-05-22 RX ORDER — OXYCODONE HYDROCHLORIDE 5 MG/1
5 TABLET ORAL
Status: CANCELLED | OUTPATIENT
Start: 2024-05-22

## 2024-05-22 RX ORDER — DEXAMETHASONE SODIUM PHOSPHATE 10 MG/ML
4 INJECTION, SOLUTION INTRAMUSCULAR; INTRAVENOUS
Status: DISCONTINUED | OUTPATIENT
Start: 2024-05-22 | End: 2024-05-22 | Stop reason: HOSPADM

## 2024-05-22 RX ORDER — FENTANYL CITRATE 50 UG/ML
50 INJECTION, SOLUTION INTRAMUSCULAR; INTRAVENOUS EVERY 5 MIN PRN
Status: DISCONTINUED | OUTPATIENT
Start: 2024-05-22 | End: 2024-05-22 | Stop reason: HOSPADM

## 2024-05-22 RX ORDER — SODIUM CHLORIDE, SODIUM LACTATE, POTASSIUM CHLORIDE, CALCIUM CHLORIDE 600; 310; 30; 20 MG/100ML; MG/100ML; MG/100ML; MG/100ML
INJECTION, SOLUTION INTRAVENOUS CONTINUOUS
Status: DISCONTINUED | OUTPATIENT
Start: 2024-05-22 | End: 2024-05-22 | Stop reason: HOSPADM

## 2024-05-22 RX ORDER — LIDOCAINE HYDROCHLORIDE 20 MG/ML
INJECTION, SOLUTION INFILTRATION; PERINEURAL PRN
Status: DISCONTINUED | OUTPATIENT
Start: 2024-05-22 | End: 2024-05-22

## 2024-05-22 RX ORDER — LIDOCAINE 40 MG/G
CREAM TOPICAL
Status: DISCONTINUED | OUTPATIENT
Start: 2024-05-22 | End: 2024-05-22 | Stop reason: HOSPADM

## 2024-05-22 RX ORDER — HYDRALAZINE HYDROCHLORIDE 20 MG/ML
2.5-5 INJECTION INTRAMUSCULAR; INTRAVENOUS EVERY 10 MIN PRN
Status: DISCONTINUED | OUTPATIENT
Start: 2024-05-22 | End: 2024-05-22 | Stop reason: HOSPADM

## 2024-05-22 RX ORDER — LABETALOL 20 MG/4 ML (5 MG/ML) INTRAVENOUS SYRINGE
10
Status: DISCONTINUED | OUTPATIENT
Start: 2024-05-22 | End: 2024-05-22 | Stop reason: HOSPADM

## 2024-05-22 RX ORDER — PROPOFOL 10 MG/ML
INJECTION, EMULSION INTRAVENOUS PRN
Status: DISCONTINUED | OUTPATIENT
Start: 2024-05-22 | End: 2024-05-22

## 2024-05-22 RX ORDER — ONDANSETRON 2 MG/ML
4 INJECTION INTRAMUSCULAR; INTRAVENOUS EVERY 30 MIN PRN
Status: DISCONTINUED | OUTPATIENT
Start: 2024-05-22 | End: 2024-05-22 | Stop reason: HOSPADM

## 2024-05-22 RX ORDER — ONDANSETRON 4 MG/1
4 TABLET, ORALLY DISINTEGRATING ORAL EVERY 30 MIN PRN
Status: DISCONTINUED | OUTPATIENT
Start: 2024-05-22 | End: 2024-05-22 | Stop reason: HOSPADM

## 2024-05-22 RX ADMIN — Medication 10 MG: at 13:38

## 2024-05-22 RX ADMIN — PROPOFOL 20 MG: 10 INJECTION, EMULSION INTRAVENOUS at 13:44

## 2024-05-22 RX ADMIN — PROPOFOL 20 MG: 10 INJECTION, EMULSION INTRAVENOUS at 13:41

## 2024-05-22 RX ADMIN — PROPOFOL 100 MG: 10 INJECTION, EMULSION INTRAVENOUS at 13:32

## 2024-05-22 RX ADMIN — SODIUM CHLORIDE, POTASSIUM CHLORIDE, SODIUM LACTATE AND CALCIUM CHLORIDE: 600; 310; 30; 20 INJECTION, SOLUTION INTRAVENOUS at 12:12

## 2024-05-22 RX ADMIN — PROPOFOL 20 MG: 10 INJECTION, EMULSION INTRAVENOUS at 13:46

## 2024-05-22 RX ADMIN — LIDOCAINE HYDROCHLORIDE 100 MG: 20 INJECTION, SOLUTION INFILTRATION; PERINEURAL at 13:30

## 2024-05-22 RX ADMIN — PROPOFOL 50 MG: 10 INJECTION, EMULSION INTRAVENOUS at 13:30

## 2024-05-22 RX ADMIN — Medication 5 MG: at 13:37

## 2024-05-22 RX ADMIN — PROPOFOL 50 MG: 10 INJECTION, EMULSION INTRAVENOUS at 13:34

## 2024-05-22 RX ADMIN — Medication 10 MG: at 13:42

## 2024-05-22 ASSESSMENT — ACTIVITIES OF DAILY LIVING (ADL)
ADLS_ACUITY_SCORE: 20

## 2024-05-22 NOTE — OP NOTE
REPORT OF OPERATION  DATE OF PROCEDURE: 5/22/2024    PATIENT: Oswald Goel    SURGERY PERFORMED: Esophagogastroduodenoscopy with biopsies and colonoscopy     PREOPERATIVE DIAGNOSIS:   Anemia    POSTOPERATIVE DIAGNOSIS:    Normal Esophagogastroduodenoscopy   Squamous columnar junction at 40   Normal colonoscopy   Diverticulosis was identified.   Hemorrhoids  were  identified.    SURGEON: Akshat Armas MD    ASSISTANTS: None    ANESTHESIA: Monitored Anesthesia Care    COMPLICATIONS: None apparent    TRANSFUSIONS: None    TISSUE TO PATHOLOGY: Duodenal, Antral, and Distal Esophageal    FINDINGS:   Normal Esophagogastroduodenoscopy   Normal colonoscopy   Diverticulosis was identified.   Hemorrhoids  were  identified.    INDICATIONS: This is a 73 year old male in need of an Esophagogastroduodenoscopy and a colonoscopy for Anemia.  The patient will be taken to the endoscopy suite for those procedures.    DESCRIPTIONS OF PROCEDURE IN DETAIL: After consent was obtained the patient was taken to the operative suite and gena in the left lateral decubitus position.  The patient was identified and the correct patient was confirmed. Monitored Anesthesia Care was given by anesthesia. A time out was performed verifying the correct patient and the correct procedure.  The entire operative team was in agreement.  All necessary equipment and supplies were in the room.    The endoscope was inserted into the mouth and passed without difficulty to the third portion of the duodenum.  Duodenal biopsies were taken.  The endoscope was then withdrawn through the duodenum, the duodenal bulb and pyloric channel and no abnormalities were noted.  The endoscope was brought back into the stomach and antral biopsies were obtained.  The endoscope was then retroflexed and no lesions of the fundus body or antrum were seen.  The endoscope was straightened back out and brought into the distal esophagus and a well-defined squamocolumnar junction was  identified at 40 cm. Biopsies of the distal esophagus were taken.  The endoscope was slowly withdrawn through the remaining esophagus no other abnormalities are seen,  The endoscope was withdrawn from the patient and the patient was positioned for colonoscopy.    Rectal exam was performed and no lesions of the anal canal were noted.  The colonoscope was inserted into the anus and passed without difficulty to the cecum.  The cecum was identified by the ileocecal valve, the coalescence of the tinea and the appendiceal orifice.  Upon withdrawal all walls of the colon were visualized.  There were no polyps, masses or evidence of colitis seen.  Diverticulosis was seen.  Upon reaching the rectum the scope was retroflexed and internal hemorrhoids  were  seen.  The scope was straightened back out and removed from the patient.  The patient was then taken to the recovery room in stable condition tolerating the procedure well.      Prep: fair    Withdrawal time was 6 minutes.    It is recommended that the patient have another colonoscopy PRN.

## 2024-05-22 NOTE — ANESTHESIA POSTPROCEDURE EVALUATION
Patient: Oswald Goel    Procedure: Procedure(s):  Upper endoscopy with biopsy and colonoscopy       Anesthesia Type:  MAC    Note:  Disposition: Outpatient   Postop Pain Control: Uneventful            Sign Out: Well controlled pain   PONV: No   Neuro/Psych: Uneventful            Sign Out: Acceptable/Baseline neuro status   Airway/Respiratory: Uneventful            Sign Out: Acceptable/Baseline resp. status   CV/Hemodynamics: Uneventful            Sign Out: Acceptable CV status; No obvious hypovolemia; No obvious fluid overload   Other NRE: NONE   DID A NON-ROUTINE EVENT OCCUR? No           Last vitals:  Vitals Value Taken Time   /63 05/22/24 1410   Temp 97.2  F (36.2  C) 05/22/24 1410   Pulse 67 05/22/24 1410   Resp 16 05/22/24 1410   SpO2 94 % 05/22/24 1415       Electronically Signed By: ERIN Singh CRNA  May 22, 2024  2:52 PM

## 2024-05-22 NOTE — ANESTHESIA CARE TRANSFER NOTE
Patient: Oswald Goel    Procedure: Procedure(s):  Upper endoscopy with biopsy and colonoscopy       Diagnosis: Anemia [D64.9]  Melena [K92.1]  History of colonic polyps [Z86.010]  Diarrhea [R19.7]  Diagnosis Additional Information: No value filed.    Anesthesia Type:   MAC     Note:    Oropharynx: oropharynx clear of all foreign objects and spontaneously breathing  Level of Consciousness: drowsy  Oxygen Supplementation: room air    Independent Airway: airway patency satisfactory and stable  Dentition: dentition unchanged  Vital Signs Stable: post-procedure vital signs reviewed and stable  Report to RN Given: handoff report given  Patient transferred to: Phase II    Handoff Report: Identifed the Patient, Identified the Reponsible Provider, Reviewed the pertinent medical history, Discussed the surgical course, Reviewed Intra-OP anesthesia mangement and issues during anesthesia, Set expectations for post-procedure period and Allowed opportunity for questions and acknowledgement of understanding      Vitals:  Vitals Value Taken Time   /63 05/22/24 1410   Temp 97.2  F (36.2  C) 05/22/24 1410   Pulse 67 05/22/24 1410   Resp 16 05/22/24 1410   SpO2 94 % 05/22/24 1415       Electronically Signed By: ERIN Singh CRNA  May 22, 2024  2:53 PM

## 2024-05-22 NOTE — OR NURSING
Patient and responsible adult given discharge instructions with no questions regarding instructions. Derrell score 20. Pain level 0/10.  Discharged from unit via ambulation. Patient discharged to home.

## 2024-05-23 DIAGNOSIS — N18.30 CHRONIC KIDNEY DISEASE, STAGE III (MODERATE) (H): Primary | ICD-10-CM

## 2024-05-28 ENCOUNTER — LAB (OUTPATIENT)
Dept: LAB | Facility: OTHER | Age: 74
End: 2024-05-28
Payer: COMMERCIAL

## 2024-05-28 ENCOUNTER — TELEPHONE (OUTPATIENT)
Dept: FAMILY MEDICINE | Facility: OTHER | Age: 74
End: 2024-05-28

## 2024-05-28 DIAGNOSIS — I10 ESSENTIAL HYPERTENSION, BENIGN: ICD-10-CM

## 2024-05-28 DIAGNOSIS — E78.2 MIXED HYPERLIPIDEMIA: ICD-10-CM

## 2024-05-28 DIAGNOSIS — N18.30 CHRONIC KIDNEY DISEASE, STAGE III (MODERATE) (H): ICD-10-CM

## 2024-05-28 LAB
ALBUMIN MFR UR ELPH: 10.3 MG/DL
ALBUMIN SERPL BCG-MCNC: 4.2 G/DL (ref 3.5–5.2)
ALBUMIN UR-MCNC: NEGATIVE MG/DL
ANION GAP SERPL CALCULATED.3IONS-SCNC: 12 MMOL/L (ref 7–15)
APPEARANCE UR: CLEAR
BACTERIA #/AREA URNS HPF: ABNORMAL /HPF
BASOPHILS # BLD AUTO: 0 10E3/UL (ref 0–0.2)
BASOPHILS NFR BLD AUTO: 0 %
BILIRUB UR QL STRIP: NEGATIVE
BUN SERPL-MCNC: 29.2 MG/DL (ref 8–23)
CALCIUM SERPL-MCNC: 9.6 MG/DL (ref 8.8–10.2)
CHLORIDE SERPL-SCNC: 107 MMOL/L (ref 98–107)
COLOR UR AUTO: YELLOW
CREAT SERPL-MCNC: 1.28 MG/DL (ref 0.67–1.17)
CREAT UR-MCNC: 124.1 MG/DL
DEPRECATED HCO3 PLAS-SCNC: 21 MMOL/L (ref 22–29)
EGFRCR SERPLBLD CKD-EPI 2021: 58 ML/MIN/1.73M2
EOSINOPHIL # BLD AUTO: 0.2 10E3/UL (ref 0–0.7)
EOSINOPHIL NFR BLD AUTO: 3 %
ERYTHROCYTE [DISTWIDTH] IN BLOOD BY AUTOMATED COUNT: 13.8 % (ref 10–15)
ERYTHROCYTE [SEDIMENTATION RATE] IN BLOOD BY WESTERGREN METHOD: 29 MM/HR (ref 0–20)
FERRITIN SERPL-MCNC: 367 NG/ML (ref 31–409)
GLUCOSE SERPL-MCNC: 126 MG/DL (ref 70–99)
GLUCOSE UR STRIP-MCNC: NEGATIVE MG/DL
HCT VFR BLD AUTO: 34.4 % (ref 40–53)
HGB BLD-MCNC: 11.6 G/DL (ref 13.3–17.7)
HGB UR QL STRIP: NEGATIVE
IMM GRANULOCYTES # BLD: 0 10E3/UL
IMM GRANULOCYTES NFR BLD: 1 %
IRON BINDING CAPACITY (ROCHE): 269 UG/DL (ref 240–430)
IRON SATN MFR SERPL: 24 % (ref 15–46)
IRON SERPL-MCNC: 64 UG/DL (ref 61–157)
KETONES UR STRIP-MCNC: NEGATIVE MG/DL
LEUKOCYTE ESTERASE UR QL STRIP: NEGATIVE
LYMPHOCYTES # BLD AUTO: 1.2 10E3/UL (ref 0.8–5.3)
LYMPHOCYTES NFR BLD AUTO: 17 %
MCH RBC QN AUTO: 32.8 PG (ref 26.5–33)
MCHC RBC AUTO-ENTMCNC: 33.7 G/DL (ref 31.5–36.5)
MCV RBC AUTO: 97 FL (ref 78–100)
MONOCYTES # BLD AUTO: 0.7 10E3/UL (ref 0–1.3)
MONOCYTES NFR BLD AUTO: 11 %
MUCOUS THREADS #/AREA URNS LPF: PRESENT /LPF
NEUTROPHILS # BLD AUTO: 4.5 10E3/UL (ref 1.6–8.3)
NEUTROPHILS NFR BLD AUTO: 68 %
NITRATE UR QL: NEGATIVE
NRBC # BLD AUTO: 0 10E3/UL
NRBC BLD AUTO-RTO: 0 /100
PATH REPORT.COMMENTS IMP SPEC: NORMAL
PATH REPORT.FINAL DX SPEC: NORMAL
PATH REPORT.GROSS SPEC: NORMAL
PATH REPORT.MICROSCOPIC SPEC OTHER STN: NORMAL
PATH REPORT.RELEVANT HX SPEC: NORMAL
PH UR STRIP: 5 [PH] (ref 4.7–8)
PHOSPHATE SERPL-MCNC: 2.3 MG/DL (ref 2.5–4.5)
PHOTO IMAGE: NORMAL
PLATELET # BLD AUTO: 232 10E3/UL (ref 150–450)
POTASSIUM SERPL-SCNC: 4.2 MMOL/L (ref 3.4–5.3)
PROT/CREAT 24H UR: 0.08 MG/MG CR (ref 0–0.2)
RBC # BLD AUTO: 3.54 10E6/UL (ref 4.4–5.9)
RBC URINE: <1 /HPF
SODIUM SERPL-SCNC: 140 MMOL/L (ref 135–145)
SP GR UR STRIP: 1.02 (ref 1–1.03)
SQUAMOUS EPITHELIAL: 0 /HPF
UROBILINOGEN UR STRIP-MCNC: NORMAL MG/DL
VIT D+METAB SERPL-MCNC: 32 NG/ML (ref 20–50)
WBC # BLD AUTO: 6.7 10E3/UL (ref 4–11)
WBC URINE: 1 /HPF

## 2024-05-28 PROCEDURE — 80069 RENAL FUNCTION PANEL: CPT | Mod: ZL

## 2024-05-28 PROCEDURE — 83540 ASSAY OF IRON: CPT | Mod: ZL

## 2024-05-28 PROCEDURE — 81001 URINALYSIS AUTO W/SCOPE: CPT | Mod: ZL

## 2024-05-28 PROCEDURE — 36415 COLL VENOUS BLD VENIPUNCTURE: CPT | Mod: ZL

## 2024-05-28 PROCEDURE — 82728 ASSAY OF FERRITIN: CPT | Mod: ZL

## 2024-05-28 PROCEDURE — 85025 COMPLETE CBC W/AUTO DIFF WBC: CPT | Mod: ZL

## 2024-05-28 PROCEDURE — 83550 IRON BINDING TEST: CPT | Mod: ZL

## 2024-05-28 PROCEDURE — 86160 COMPLEMENT ANTIGEN: CPT | Mod: ZL

## 2024-05-28 PROCEDURE — 82306 VITAMIN D 25 HYDROXY: CPT | Mod: ZL

## 2024-05-28 PROCEDURE — 85652 RBC SED RATE AUTOMATED: CPT | Mod: ZL

## 2024-05-28 PROCEDURE — 86225 DNA ANTIBODY NATIVE: CPT | Mod: ZL

## 2024-05-28 PROCEDURE — 83970 ASSAY OF PARATHORMONE: CPT | Mod: ZL

## 2024-05-28 PROCEDURE — 83521 IG LIGHT CHAINS FREE EACH: CPT | Mod: ZL

## 2024-05-28 PROCEDURE — 84156 ASSAY OF PROTEIN URINE: CPT | Mod: ZL

## 2024-05-28 NOTE — TELEPHONE ENCOUNTER
Norvasc       Last Written Prescription Date:  11/29/2023  Last Fill Quantity: 90,   # refills: 1  Last Office Visit: 5/7/2024  Zocor       Last Written Prescription Date:  8/24/2023  Last Fill Quantity: 90,   # refills: 2  Last Office Visit: 5/07/2024  Future Office visit:    Next 5 appointments (look out 90 days)      Jun 17, 2024  9:30 AM  (Arrive by 9:15 AM)  [facility] [place] [facility] [address]  [place] [address]  [phone]     Aug 16, 2024  1:15 PM  (Arrive by 1:00 PM)  [facility] ([facility] [place] ) [address]  [place] [address]  [phone]

## 2024-05-28 NOTE — TELEPHONE ENCOUNTER
11:18 AM    Reason for Call: Phone Call    Description: Patient has questions on his kidneys he had an ultrasound done in [place] and his lab work and urine done today and he is wondering if he still needs to go down to [place] for his kidney's? At [facility] John Velásquez Friday June 7, 2024    Was an appointment offered for this call? No  If yes : Appointment type              Date    Preferred method for responding to this message: Telephone Call  What is your phone number ? [phone]    If we cannot reach you directly, may we leave a detailed response at the number you provided? No    Can this message wait until your PCP/provider returns, if available today? YES, No

## 2024-05-28 NOTE — TELEPHONE ENCOUNTER
Spoke with pt and explained that Dr Velásquez ordered labs today to discuss at his appt and he should keep that appt.  Pt verbalizes understanding.

## 2024-05-29 LAB
C3 SERPL-MCNC: 141 MG/DL (ref 81–157)
C4 SERPL-MCNC: 28 MG/DL (ref 13–39)
DSDNA AB SER-ACNC: 0.9 IU/ML
KAPPA LC FREE SER-MCNC: 2.44 MG/DL (ref 0.33–1.94)
KAPPA LC FREE/LAMBDA FREE SER NEPH: 1.34 {RATIO} (ref 0.26–1.65)
LAMBDA LC FREE SERPL-MCNC: 1.82 MG/DL (ref 0.57–2.63)
PTH-INTACT SERPL-MCNC: 10 PG/ML (ref 15–65)

## 2024-05-29 RX ORDER — AMLODIPINE BESYLATE 10 MG/1
10 TABLET ORAL DAILY
Qty: 90 TABLET | Refills: 2 | Status: SHIPPED | OUTPATIENT
Start: 2024-05-29

## 2024-05-29 RX ORDER — SIMVASTATIN 20 MG
20 TABLET ORAL
Qty: 90 TABLET | Refills: 2 | Status: SHIPPED | OUTPATIENT
Start: 2024-05-29

## 2024-05-31 DIAGNOSIS — I10 ESSENTIAL HYPERTENSION, BENIGN: ICD-10-CM

## 2024-05-31 RX ORDER — LISINOPRIL 20 MG/1
TABLET ORAL
Qty: 90 TABLET | Refills: 1 | Status: SHIPPED | OUTPATIENT
Start: 2024-05-31

## 2024-05-31 RX ORDER — CHLORTHALIDONE 25 MG/1
TABLET ORAL
Qty: 90 TABLET | Refills: 0 | Status: SHIPPED | OUTPATIENT
Start: 2024-05-31 | End: 2024-06-17

## 2024-05-31 NOTE — TELEPHONE ENCOUNTER
lisinopril (ZESTRIL) 20 MG tablet     Last Written Prescription Date:  3-4-24  Last Fill Quantity: 90,   # refills: 0  Last Office Visit: 5-7-24  Future Office visit:    Next 5 appointments (look out 90 days)      Jun 17, 2024  9:30 AM  (Arrive by 9:15 AM)  Pre-Operative Physical with Meche Dougherty MD  [facility] [place] ([facility] [place] ) [address]  [place] [address]  [phone]     Aug 16, 2024  1:15 PM  (Arrive by 1:00 PM)  Adult Preventative Visit with Meche Dougherty MD  [facility] ([facility] [place] ) [address]  [place] [address]  [phone]             Routing refill request to provider for review/approval because:  Drug not on the G, [facility] or [facility] refill protocol or controlled substance

## 2024-05-31 NOTE — TELEPHONE ENCOUNTER
chlorthalidone (HYGROTON) 25 MG tablet      Last Written Prescription Date:  6-6-23  Last Fill Quantity: 90,   # refills: 3  Last Office Visit: 5-7-24  Future Office visit:    Next 5 appointments (look out 90 days)      Jun 17, 2024  9:30 AM  (Arrive by 9:15 AM)  Pre-Operative Physical with Meche Dougherty MD  [facility] [place] ([facility] [address]  [place] [address]  [phone]     Aug 16, 2024  1:15 PM  (Arrive by 1:00 PM)  Adult Preventative Visit with Meche Dougherty MD  [facility] ([facility] [place] ) [address]  [place] [address]  [phone]             Routing refill request to provider for review/approval because:  Drug not on the [facility], [facility] or [facility] refill protocol or controlled substance

## 2024-05-31 NOTE — TELEPHONE ENCOUNTER
Failed protocol due to    ACE Inhibitors (Including Combos) Protocol [id number]/31/2024 08:59 AM   Protocol Details Has GFR on file in past 12 months and most recent value is normal

## 2024-06-11 ENCOUNTER — OFFICE VISIT (OUTPATIENT)
Dept: SURGERY | Facility: OTHER | Age: 74
End: 2024-06-11
Attending: SURGERY
Payer: COMMERCIAL

## 2024-06-11 VITALS
OXYGEN SATURATION: 97 % | BODY MASS INDEX: 32.14 KG/M2 | WEIGHT: 200 LBS | HEART RATE: 57 BPM | HEIGHT: 66 IN | TEMPERATURE: 97.5 F | RESPIRATION RATE: 18 BRPM | DIASTOLIC BLOOD PRESSURE: 60 MMHG | SYSTOLIC BLOOD PRESSURE: 128 MMHG

## 2024-06-11 DIAGNOSIS — K21.00 GASTROESOPHAGEAL REFLUX DISEASE WITH ESOPHAGITIS WITHOUT HEMORRHAGE: Primary | ICD-10-CM

## 2024-06-11 DIAGNOSIS — D64.9 ANEMIA, UNSPECIFIED TYPE: ICD-10-CM

## 2024-06-11 PROCEDURE — 99213 OFFICE O/P EST LOW 20 MIN: CPT | Performed by: SURGERY

## 2024-06-11 PROCEDURE — G0463 HOSPITAL OUTPT CLINIC VISIT: HCPCS

## 2024-06-11 RX ORDER — OMEPRAZOLE 40 MG/1
40 CAPSULE, DELAYED RELEASE ORAL DAILY
Qty: 90 CAPSULE | Refills: 3 | Status: SHIPPED | OUTPATIENT
Start: 2024-06-11 | End: 2024-06-17

## 2024-06-11 RX ORDER — FERROUS SULFATE 325(65) MG
325 TABLET ORAL
COMMUNITY

## 2024-06-11 ASSESSMENT — PAIN SCALES - GENERAL: PAINLEVEL: NO PAIN (0)

## 2024-06-11 NOTE — PROGRESS NOTES
CLINIC NOTE - POST-OP ENDOSCOPY  6/11/2024    Patient:Oswald Goel    Procedure: Esophagogastroduodenoscopy with biopsy and Colonoscopy with biopsies    This is a 73 year old male who is 3 weeks s/p Esophagogastroduodenoscopy with biopsy and Colonoscopy.  At the time of endoscopy Garcia's esophagus was identified.  No other abnormalities were noted.     Current Medications:  Current Outpatient Medications   Medication Sig Dispense Refill    acetaminophen (TYLENOL) 500 MG tablet Take 500-1,000 mg by mouth every 6 hours as needed for mild pain      allopurinol (ZYLOPRIM) 100 MG tablet Take 2 tablets (200 mg) by mouth daily 180 tablet 2    amLODIPine (NORVASC) 10 MG tablet TAKE 1 TABLET BY MOUTH ONCE DAILY. 90 tablet 2    chlorthalidone (HYGROTON) 25 MG tablet TAKE 1 TABLET BY MOUTH ONCE DAILY. 90 tablet 0    citalopram (CELEXA) 20 MG tablet TAKE 1 TABLET BY MOUTH ONCE DAILY. 90 tablet 3    colchicine (COLCRYS) 0.6 MG tablet Take 2 tablets x 1 and then 1 tablet one hour later, repeat on day 2 if needed 6 tablet 0    cyanocobalamin (VITAMIN B-12) 500 MCG SUBL sublingual tablet Place 500 mcg under the tongue daily      ferrous sulfate (FEROSUL) 325 (65 Fe) MG tablet Take 325 mg by mouth daily (with breakfast)      fish oil-omega-3 fatty acids 1000 MG capsule Take 1 g by mouth daily      lisinopril (ZESTRIL) 20 MG tablet TAKE 1 TABLET BY MOUTH ONCE DAILY. 90 tablet 1    Milk Thistle-Dand-Fennel-Licor (MILK THISTLE XTRA) CAPS capsule       montelukast (SINGULAIR) 10 MG tablet TAKE ONE TABLET BY MOUTH AT BEDTIME. 90 tablet 3    Multiple Vitamin (DAILY MULTIVITAMIN PO) Take 1 tablet by Oral route every day with food      omeprazole (PRILOSEC) 40 MG DR capsule Take 1 capsule (40 mg) by mouth daily 90 capsule 3    omeprazole (PRILOSEC) 40 MG DR capsule Take 1 capsule (40 mg) by mouth 2 times daily 180 capsule 1    simvastatin (ZOCOR) 20 MG tablet TAKE ONE TABLET BY MOUTH AT BEDTIME. 90 tablet 2    traZODone (DESYREL) 50 MG  "tablet Take 1.5 tablets (75 mg) by mouth at bedtime 135 tablet 2    vitamin C (ASCORBIC ACID) 1000 MG TABS Take 1,000 mg by mouth daily      Vitamin D3 (CHOLECALCIFEROL) 25 mcg (1000 units) tablet Take 3 tablets by mouth daily         Allergies:  Allergies   Allergen Reactions    Animal Dander      Deer hair       PHYSICAL EXAM:   Vital signs: /60 (BP Location: Right arm, Cuff Size: Adult Regular)   Pulse 57   Temp 97.5  F (36.4  C) (Tympanic)   Resp 18   Ht 1.676 m (5' 6\")   Wt 90.7 kg (200 lb)   SpO2 97%   BMI 32.28 kg/m     Weight: [unfilled]   BMI: Body mass index is 32.28 kg/m .   General: Normal, healthy, cooperative, in no acute distress, alert     PATHOLOGY:  Case Report   Date Value Ref Range Status   05/22/2024   Final    Surgical Pathology Report                         Case: [id number]                                  Authorizing Provider:  Akshat Armas MD  Collected:           05/22/2024 01:33 PM          Ordering Location:     [facility] Main Operating Room     Received:            05/23/2024 12:10 PM          Pathologist:           Heather Palencia MD                                                          Specimens:   A) - Small Intestine, Duodenum                                                                      B) - Stomach, Antrum                                                                                C) - Esophagus, Distal                                                                      Final Diagnosis   Date Value Ref Range Status   05/22/2024   Final    A(1). Duodenum, biopsy:  -Small intestinal mucosa with no significant histopathologic abnormalities.  -Normal villous architecture identified and no prominence in intraepithelial lymphocytes seen.  -Negative for luminal organisms.  -Negative for dysplasia or malignancy.      B(2).  Stomach, biopsy:  - Oxyntic type gastric mucosa with mild chronic inflammation.  - Negative for H. Pylori organisms on routine stains.  - " Negative for intestinal metaplasia.   -Negative for dysplasia or malignancy      C(3).  Esophagus, distal, biopsy:  -Gastric type columnar mucosa with intestinal metaplasia (see comment)  -Adjacent squamous mucosa with reactive epithelial changes of the type seen in reflux esophagitis.  -Negative for acute or eosinophilic esophagitis.  -Negative for dysplasia or malignancy.             ASSESSMENT:    73 year old male who is 3 weeks s/p Esophagogastroduodenoscopy with biopsy and Colonoscopy with biopsies.  Garcia's esophagus was noted.  No other reasons for a GI blood loss were seen.  He is well-controlled on Prilosec.    PLAN:   Continue Prilosec.

## 2024-06-14 DIAGNOSIS — J43.9 PULMONARY EMPHYSEMA, UNSPECIFIED EMPHYSEMA TYPE (H): ICD-10-CM

## 2024-06-14 RX ORDER — MONTELUKAST SODIUM 10 MG/1
TABLET ORAL
Qty: 90 TABLET | Refills: 2 | Status: SHIPPED | OUTPATIENT
Start: 2024-06-14

## 2024-06-14 NOTE — TELEPHONE ENCOUNTER
Montelukast (Singulair) 10 mg tablet  Take one tablet by mouth at bedtime   Last Written Prescription Date:  6-  Last Fill Quantity: 90 tablet,   # refills: 3  Last Office Visit: 5-7-2024  Future Office visit:    Next 5 appointments (look out 90 days)      Jun 17, 2024  9:30 AM  (Arrive by 9:15 AM)  [facility] [place] ) [address]  [place] [address]  [phone]     Aug 16, 2024  1:15 PM  (Arrive by 1:00 PM)  Adult Preventative Visit [facility] [place] ([facility] [place] ) [address]  [place] [address]  [phone]

## 2024-06-17 ENCOUNTER — OFFICE VISIT (OUTPATIENT)
Dept: FAMILY MEDICINE | Facility: OTHER | Age: 74
End: 2024-06-17
Attending: FAMILY MEDICINE
Payer: COMMERCIAL

## 2024-06-17 ENCOUNTER — LAB (OUTPATIENT)
Dept: LAB | Facility: OTHER | Age: 74
End: 2024-06-17
Attending: FAMILY MEDICINE
Payer: COMMERCIAL

## 2024-06-17 VITALS
BODY MASS INDEX: 33.43 KG/M2 | RESPIRATION RATE: 16 BRPM | DIASTOLIC BLOOD PRESSURE: 70 MMHG | SYSTOLIC BLOOD PRESSURE: 144 MMHG | WEIGHT: 208 LBS | TEMPERATURE: 97.5 F | OXYGEN SATURATION: 97 % | HEART RATE: 54 BPM | HEIGHT: 66 IN

## 2024-06-17 DIAGNOSIS — R07.89 ATYPICAL CHEST PAIN: ICD-10-CM

## 2024-06-17 DIAGNOSIS — E78.2 MIXED HYPERLIPIDEMIA: ICD-10-CM

## 2024-06-17 DIAGNOSIS — I10 ESSENTIAL HYPERTENSION, BENIGN: ICD-10-CM

## 2024-06-17 DIAGNOSIS — M16.7 OTHER SECONDARY OSTEOARTHRITIS OF LEFT HIP: ICD-10-CM

## 2024-06-17 DIAGNOSIS — K21.9 GASTROESOPHAGEAL REFLUX DISEASE WITHOUT ESOPHAGITIS: ICD-10-CM

## 2024-06-17 DIAGNOSIS — D64.9 ANEMIA, UNSPECIFIED TYPE: ICD-10-CM

## 2024-06-17 DIAGNOSIS — K92.1 MELENA: ICD-10-CM

## 2024-06-17 DIAGNOSIS — Z01.818 PREOP GENERAL PHYSICAL EXAM: Primary | ICD-10-CM

## 2024-06-17 DIAGNOSIS — N18.30 STAGE 3 CHRONIC KIDNEY DISEASE, UNSPECIFIED WHETHER STAGE 3A OR 3B CKD (H): ICD-10-CM

## 2024-06-17 DIAGNOSIS — Z01.818 PREOP GENERAL PHYSICAL EXAM: ICD-10-CM

## 2024-06-17 LAB
ALBUMIN SERPL BCG-MCNC: 4.1 G/DL (ref 3.5–5.2)
ALP SERPL-CCNC: 111 U/L (ref 40–150)
ALT SERPL W P-5'-P-CCNC: 29 U/L (ref 0–70)
ANION GAP SERPL CALCULATED.3IONS-SCNC: 11 MMOL/L (ref 7–15)
AST SERPL W P-5'-P-CCNC: 33 U/L (ref 0–45)
BILIRUB SERPL-MCNC: 0.5 MG/DL
BUN SERPL-MCNC: 23.4 MG/DL (ref 8–23)
CALCIUM SERPL-MCNC: 9.7 MG/DL (ref 8.8–10.2)
CHLORIDE SERPL-SCNC: 104 MMOL/L (ref 98–107)
CREAT SERPL-MCNC: 1.3 MG/DL (ref 0.67–1.17)
CREAT UR-MCNC: 171.3 MG/DL
DEPRECATED HCO3 PLAS-SCNC: 23 MMOL/L (ref 22–29)
EGFRCR SERPLBLD CKD-EPI 2021: 57 ML/MIN/1.73M2
ERYTHROCYTE [DISTWIDTH] IN BLOOD BY AUTOMATED COUNT: 13.7 % (ref 10–15)
GLUCOSE SERPL-MCNC: 103 MG/DL (ref 70–99)
HCT VFR BLD AUTO: 36.8 % (ref 40–53)
HGB BLD-MCNC: 12.1 G/DL (ref 13.3–17.7)
MCH RBC QN AUTO: 32.4 PG (ref 26.5–33)
MCHC RBC AUTO-ENTMCNC: 32.9 G/DL (ref 31.5–36.5)
MCV RBC AUTO: 99 FL (ref 78–100)
MICROALBUMIN UR-MCNC: 16 MG/L
MICROALBUMIN/CREAT UR: 9.34 MG/G CR (ref 0–17)
PLATELET # BLD AUTO: 241 10E3/UL (ref 150–450)
POTASSIUM SERPL-SCNC: 4.7 MMOL/L (ref 3.4–5.3)
PROT SERPL-MCNC: 7.7 G/DL (ref 6.4–8.3)
RBC # BLD AUTO: 3.73 10E6/UL (ref 4.4–5.9)
SODIUM SERPL-SCNC: 138 MMOL/L (ref 135–145)
WBC # BLD AUTO: 7 10E3/UL (ref 4–11)

## 2024-06-17 PROCEDURE — 84155 ASSAY OF PROTEIN SERUM: CPT | Mod: ZL

## 2024-06-17 PROCEDURE — 36415 COLL VENOUS BLD VENIPUNCTURE: CPT | Mod: ZL

## 2024-06-17 PROCEDURE — G0463 HOSPITAL OUTPT CLINIC VISIT: HCPCS

## 2024-06-17 PROCEDURE — 82570 ASSAY OF URINE CREATININE: CPT | Mod: ZL

## 2024-06-17 PROCEDURE — 99214 OFFICE O/P EST MOD 30 MIN: CPT | Performed by: FAMILY MEDICINE

## 2024-06-17 PROCEDURE — 85027 COMPLETE CBC AUTOMATED: CPT | Mod: ZL

## 2024-06-17 PROCEDURE — 82247 BILIRUBIN TOTAL: CPT | Mod: ZL

## 2024-06-17 RX ORDER — OMEPRAZOLE 40 MG/1
40 CAPSULE, DELAYED RELEASE ORAL DAILY
Qty: 180 CAPSULE | Refills: 1 | Status: SHIPPED | OUTPATIENT
Start: 2024-06-17 | End: 2024-06-17

## 2024-06-17 RX ORDER — CHLORTHALIDONE 25 MG/1
25 TABLET ORAL DAILY
Qty: 90 TABLET | Refills: 1 | Status: SHIPPED | OUTPATIENT
Start: 2024-06-17

## 2024-06-17 RX ORDER — OMEPRAZOLE 40 MG/1
40 CAPSULE, DELAYED RELEASE ORAL DAILY
Qty: 90 CAPSULE | Refills: 1 | Status: SHIPPED | OUTPATIENT
Start: 2024-06-17

## 2024-06-17 ASSESSMENT — LIFESTYLE VARIABLES
HOW OFTEN DO YOU HAVE A DRINK CONTAINING ALCOHOL: 4 OR MORE TIMES A WEEK
AUDIT-C TOTAL SCORE: 4
SKIP TO QUESTIONS 9-10: 1
HOW MANY STANDARD DRINKS CONTAINING ALCOHOL DO YOU HAVE ON A TYPICAL DAY: 1 OR 2
HOW OFTEN DO YOU HAVE SIX OR MORE DRINKS ON ONE OCCASION: NEVER

## 2024-06-17 ASSESSMENT — PAIN SCALES - GENERAL: PAINLEVEL: SEVERE PAIN (7)

## 2024-06-17 NOTE — PROGRESS NOTES
Faxed Pre-op & EKG from 5/7 to [facility] at [phone] & [phone].

## 2024-06-17 NOTE — PROGRESS NOTES
Preoperative Evaluation  [facility] [place]  [address]  [place] [address]  Phone: [phone]  Primary Provider: Meche Dougherty MD  Pre-op Performing Provider: Meche Dougherty MD  Jun 17, 2024 6/17/2024   Surgical Information   What procedure is being done? Left Hip Replacement   Facility or Hospital where procedure/surgery will be performed: [facility]   Who is doing the procedure / surgery? [facility]   Date of surgery / procedure: 07/09/2024   Time of surgery / procedure: TBD   Where do you plan to recover after surgery? at home with family     Fax number for surgical facility: see HUC    Assessment & Plan     The proposed surgical procedure is considered INTERMEDIATE risk.    Preop general physical exam  Cleared for surgery  - CBC with platelets; Future  - Comprehensive metabolic panel; Future    Other secondary osteoarthritis of left hip  Reason for surgery  - CBC with platelets; Future  - Comprehensive metabolic panel; Future    Stage 3 chronic kidney disease, unspecified whether stage 3a or 3b CKD (H)  stable  - Albumin Random Urine Quantitative with Creat Ratio; Future    Mixed hyperlipidemia  stable    Anemia, unspecified type  Stable, negative work up, continue prilosec  - omeprazole (PRILOSEC) 40 MG DR capsule; Take 1 capsule (40 mg) by mouth daily    Essential hypertension, benign  Hold the day of surgery (just diuretic)  - chlorthalidone (HYGROTON) 25 MG tablet; Take 1 tablet (25 mg) by mouth daily    Gastroesophageal reflux disease without esophagitis  Continue just daily  - omeprazole (PRILOSEC) 40 MG DR capsule; Take 1 capsule (40 mg) by mouth daily    Possible Sleep Apnea:  n/a    Risks and Recommendations  The patient has the following additional risks and recommendations for perioperative complications:   - No identified additional risk factors other than previously addressed    Antiplatelet or Anticoagulation Medication Instructions   - Patient is on  no antiplatelet or anticoagulation medications.    Additional Medication Instructions  Take all scheduled medications on the day of surgery EXCEPT for modifications listed below:   - ACE/ARB: Continue without modification (e.g., MAC anesthesia, neurosurgery, spine surgery, heart failure, or labile hypertension with risk of hypertension).   - Calcium Channel Blockers: May be continued on the day of surgery.   - Diuretics: DO NOT TAKE on the day of surgery.   - Statins: Continue taking on the day of surgery.    - Herbal medications and vitamins: DO NOT TAKE 14 days prior to surgery.   - SSRIs, SNRIs, TCAs, Antipsychotics: Continue without modification.     Recommendation  Approval given to proceed with proposed procedure, without further diagnostic evaluation.    Marlyn Akers is a 73 year old, presenting for the following:  Pre-Op Exam          6/17/2024     9:31 AM   Additional Questions   Roomed by Sona Dye   Accompanied by None         6/17/2024     9:31 AM   Patient Reported Additional Medications   Patient reports taking the following new medications None     HPI related to upcoming procedure: left hip DJD, causing him quite a bit pain now        6/17/2024   Pre-Op Questionnaire   Have you ever had a heart attack or stroke? No   Have you ever had surgery on your heart or blood vessels, such as a stent placement, a coronary artery bypass, or surgery on an artery in your head, neck, heart, or legs? No   Do you have chest pain with activity? No   Do you have a history of heart failure? No   Do you currently have a cold, bronchitis or symptoms of other infection? No   Do you have a cough, shortness of breath, or wheezing? (!) YES patient reports some shortness of breath with exertion    Do you or anyone in your family have previous history of blood clots? No   Do you or does anyone in your family have a serious bleeding problem such as prolonged bleeding following surgeries or cuts? No   Have you  ever had problems with anemia or been told to take iron pills? (!) YES patient is currently taking iron    Have you had any abnormal blood loss such as black, tarry or bloody stools? No   Have you ever had a blood transfusion? No   Are you willing to have a blood transfusion if it is medically needed before, during, or after your surgery? Yes   Have you or any of your relatives ever had problems with anesthesia? No   Do you have sleep apnea, excessive snoring or daytime drowsiness? (!) YES   Do you have a CPAP machine? (!) NO patient does not have a diagnosis of sleep apnea just snoring    Do you have any artifical heart valves or other implanted medical devices like a pacemaker, defibrillator, or continuous glucose monitor? No   Do you have artificial joints? (!) YES   Are you allergic to latex? No     Health Care Directive  Patient does not have a Health Care Directive or Living Will: Discussed advance care planning with patient; however, patient declined at this time.    Preoperative Review of    reviewed - no record of controlled substances prescribed.    Status of Chronic Conditions:  See problem list for active medical problems.  Problems all longstanding and stable, except as noted/documented.  See ROS for pertinent symptoms related to these conditions.    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 09/13/2023     Priority: Medium    Other polyneuropathy 06/08/2023     Priority: Medium    Puncture wound of skin from metal nail 04/10/2023     Priority: Medium    Vitamin B12 deficiency (non anemic)  02/08/2023     Priority: Medium    SILVIA (generalized anxiety disorder) 12/12/2022     Priority: Medium    Anemia of unknown etiology 10/31/2022     Priority: Medium    Screening for prostate cancer 06/10/2022     Priority: Medium    Atypical chest pain 06/10/2022     Priority: Medium    Bilateral lower extremity pain 04/20/2022     Priority: Medium    Non-traumatic rhabdomyolysis 04/06/2022     Priority: Medium    Paresthesias 04/06/2022     Priority: Medium    * * * SBE PROPHYLAXIS * * * 03/23/2022     Priority: Medium    Other insomnia 03/23/2022     Priority: Medium    Pure hypercholesterolemia 03/23/2022     Priority: Medium    Hypovitaminosis D 03/23/2022     Priority: Medium    Iron deficiency 03/23/2022     Priority: Medium    Screen for colon cancer 06/29/2021     Priority: Medium     Added automatically from request for surgery [id number]      Tingling 04/28/2021     Priority: Medium    History of tobacco abuse 04/28/2021     Priority: Medium    TENORIO (dyspnea on exertion) 04/28/2021     Priority: Medium    History of left heart catheterization 40 to 50 years ago at the [facility] 04/28/2021     Priority: Medium    Coronary artery disease involving native coronary artery of native heart without angina pectoris 04/28/2021     Priority: Medium    History of COVID-19 on 11/20/2020 04/28/2021     Priority: Medium    Regular alcohol consumption 04/28/2021     Priority: Medium    Hypertriglyceridemia 04/28/2021     Priority: Medium    Colon cancer screening 03/25/2021     Priority: Medium     Added automatically from request for surgery [id number]      ACP (advance care planning) 08/10/2016     Priority: Medium     Advance Care Planning 8/10/2016: ACP Review of Chart / Resources Provided:  Reviewed chart for advance care plan.  Oswald Goel has no plan or code status on file. Discussed available resources and provided with information. Confirmed code status reflects current choices pending further ACP discussions.   Confirmed/documented legally designated decision makers.  Added by YOANDY NASH            Primary localized osteoarthrosis, pelvic region and thigh 01/20/2015     Priority: Medium    Advanced care planning/counseling discussion 03/30/2012     Priority: Medium    Dysthymic disorder 02/22/2011     Priority: Medium    Essential hypertension, benign 02/22/2011     Priority: Medium    RBBB 02/22/2011     Priority: Medium    Nonallopathic lesion of cervical region 08/27/2008     Priority: Medium     Problem list name updated by automated process. Provider to review      Hypertrophy of prostate without urinary obstruction 10/10/2007     Priority: Medium     Problem list name updated by automated process. Provider to review      Mixed hyperlipidemia 12/29/1999     Priority: Medium      Past Medical History:   Diagnosis Date    COPD (chronic obstructive pulmonary disease) (H) 2021    minimal on PFTs, due to absence of overinflation    Depressive disorder, not elsewhere classified 02/22/2011    Hypertension, Benign 02/22/2011    Hypertrophy (benign) of prostate without urinary o 10/10/2007    Nonallopathic lesion of cervical region, not elsewhere classified 08/27/2008    Pure hypercholesterolemia 12/29/1999    RBBB 02/22/2011     Past Surgical History:   Procedure Laterality Date    COLONOSCOPY  03/01/2011    repeat 10 yrs    COLONOSCOPY  01/01/2005    COLONOSCOPY N/A 07/29/2021    Procedure: Colonoscopy;  Surgeon: Eh Villa MD;  Location: [facility] OR    ENDOSCOPY UPPER, COLONOSCOPY, COMBINED N/A 5/22/2024    Procedure: Upper endoscopy with biopsy and colonoscopy;  Surgeon: Akshat Armas MD;  Location: [facility] OR    HERNIA REPAIR N/A 1998    unknown    left arthroscopy Left 1968    knee -- Dr Love    RELEASE CARPAL TUNNEL Bilateral     Dr Gonsalez    right shoulder replacement Right     Dr Trinh    right total hip replacement N/A     Dr Hurtado    TONSILLECTOMY      TRANSPOSITION ULNAR NERVE (ELBOW) Left 11/19/2015     Procedure: TRANSPOSITION ULNAR NERVE (ELBOW);  Surgeon: Mahesh Capps MD;  Location: [facility] OR     Current Outpatient Medications   Medication Sig Dispense Refill    acetaminophen (TYLENOL) 500 MG tablet Take 500-1,000 mg by mouth every 6 hours as needed for mild pain      allopurinol (ZYLOPRIM) 100 MG tablet Take 2 tablets (200 mg) by mouth daily 180 tablet 2    amLODIPine (NORVASC) 10 MG tablet TAKE 1 TABLET BY MOUTH ONCE DAILY. 90 tablet 2    chlorthalidone (HYGROTON) 25 MG tablet TAKE 1 TABLET BY MOUTH ONCE DAILY. 90 tablet 0    citalopram (CELEXA) 20 MG tablet TAKE 1 TABLET BY MOUTH ONCE DAILY. 90 tablet 3    colchicine (COLCRYS) 0.6 MG tablet Take 2 tablets x 1 and then 1 tablet one hour later, repeat on day 2 if needed 6 tablet 0    cyanocobalamin (VITAMIN B-12) 500 MCG SUBL sublingual tablet Place 500 mcg under the tongue daily      ferrous sulfate (FEROSUL) 325 (65 Fe) MG tablet Take 325 mg by mouth daily (with breakfast)      fish oil-omega-3 fatty acids 1000 MG capsule Take 1 g by mouth daily      lisinopril (ZESTRIL) 20 MG tablet TAKE 1 TABLET BY MOUTH ONCE DAILY. 90 tablet 1    Milk Thistle-Dand-Fennel-Licor (MILK THISTLE XTRA) CAPS capsule       montelukast (SINGULAIR) 10 MG tablet TAKE ONE TABLET BY MOUTH AT BEDTIME. 90 tablet 2    Multiple Vitamin (DAILY MULTIVITAMIN PO) Take 1 tablet by Oral route every day with food      omeprazole (PRILOSEC) 40 MG DR capsule Take 1 capsule (40 mg) by mouth 2 times daily 180 capsule 1    simvastatin (ZOCOR) 20 MG tablet TAKE ONE TABLET BY MOUTH AT BEDTIME. 90 tablet 2    traZODone (DESYREL) 50 MG tablet Take 1.5 tablets (75 mg) by mouth at bedtime 135 tablet 2    vitamin C (ASCORBIC ACID) 1000 MG TABS Take 1,000 mg by mouth daily      Vitamin D3 (CHOLECALCIFEROL) 25 mcg (1000 units) tablet Take 3 tablets by mouth daily      omeprazole (PRILOSEC) 40 MG DR capsule Take 1 capsule (40 mg) by mouth daily (Patient not taking: Reported on 6/17/2024) 90 capsule 3  "      Allergies   Allergen Reactions    Animal Dander      Deer hair        Social History     Tobacco Use    Smoking status: Former     Current packs/day: 0.00     Average packs/day: 0.1 packs/day for 2.0 years (0.2 ttl pk-yrs)     Types: Cigarettes     Start date:      Quit date:      Years since quittin.4    Smokeless tobacco: Former    Tobacco comments:     no passive exposure, tried to quit-yes   Substance Use Topics    Alcohol use: Yes     Comment: daily      Family History   Problem Relation Age of Onset    Heart Disease Mother         CABG    Pancreatic Cancer Mother 81    Other - See Comments Father 84    Family History Negative Sister     Family History Negative Sister     Unknown/Adopted Maternal Grandmother     Unknown/Adopted Maternal Grandfather     Unknown/Adopted Paternal Grandmother     Unknown/Adopted Paternal Grandfather      History   Drug Use No           Review of Systems  Constitutional, HEENT, cardiovascular, pulmonary, GI, , musculoskeletal, neuro, skin, endocrine and psych systems are negative, except as otherwise noted.    Objective    BP (!) 144/75 (BP Location: Right arm, Patient Position: Sitting, Cuff Size: Adult Large)   Pulse 54   Temp 97.5  F (36.4  C) (Tympanic)   Resp 16   Ht 1.676 m (5' 6\")   Wt 94.3 kg (208 lb)   SpO2 97%   BMI 33.57 kg/m     Estimated body mass index is 33.57 kg/m  as calculated from the following:    Height as of this encounter: 1.676 m (5' 6\").    Weight as of this encounter: 94.3 kg (208 lb).  Physical Exam  GENERAL: alert and no distress  EYES: Eyes grossly normal to inspection, PERRL and conjunctivae and sclerae normal  HENT: ear canals and TM's normal, nose and mouth without ulcers or lesions  NECK: no adenopathy, no asymmetry, masses, or scars  RESP: lungs clear to auscultation - no rales, rhonchi or wheezes  CV: regular rate and rhythm, normal S1 S2, no S3 or S4, no murmur, click or rub, no peripheral edema  ABDOMEN: soft, " nontender, no hepatosplenomegaly, no masses and bowel sounds normal  MS: no gross musculoskeletal defects noted, no edema  SKIN: no suspicious lesions or rashes  NEURO: Normal strength and tone, mentation intact and speech normal  PSYCH: mentation appears normal, affect normal/bright    Recent Labs   Lab Test 05/28/24  0948 05/07/24  1119   HGB 11.6* 11.7*    237    138   POTASSIUM 4.2 4.3   CR 1.28* 1.38*        Diagnostics  Recent Results (from the past 24 hour(s))   Albumin Random Urine Quantitative with Creat Ratio    Collection Time: 06/17/24  9:31 AM   Result Value Ref Range    Creatinine Urine mg/dL 171.3 mg/dL    Albumin Urine mg/L 16.0 mg/L    Albumin Urine mg/g Cr 9.34 0.00 - 17.00 mg/g Cr   Comprehensive metabolic panel    Collection Time: 06/17/24  9:31 AM   Result Value Ref Range    Sodium 138 135 - 145 mmol/L    Potassium 4.7 3.4 - 5.3 mmol/L    Carbon Dioxide (CO2) 23 22 - 29 mmol/L    Anion Gap 11 7 - 15 mmol/L    Urea Nitrogen 23.4 (H) 8.0 - 23.0 mg/dL    Creatinine 1.30 (H) 0.67 - 1.17 mg/dL    GFR Estimate 57 (L) >60 mL/min/1.73m2    Calcium 9.7 8.8 - 10.2 mg/dL    Chloride 104 98 - 107 mmol/L    Glucose 103 (H) 70 - 99 mg/dL    Alkaline Phosphatase 111 40 - 150 U/L    AST 33 0 - 45 U/L    ALT 29 0 - 70 U/L    Protein Total 7.7 6.4 - 8.3 g/dL    Albumin 4.1 3.5 - 5.2 g/dL    Bilirubin Total 0.5 <=1.2 mg/dL   CBC with platelets    Collection Time: 06/17/24  9:31 AM   Result Value Ref Range    WBC Count 7.0 4.0 - 11.0 10e3/uL    RBC Count 3.73 (L) 4.40 - 5.90 10e6/uL    Hemoglobin 12.1 (L) 13.3 - 17.7 g/dL    Hematocrit 36.8 (L) 40.0 - 53.0 %    MCV 99 78 - 100 fL    MCH 32.4 26.5 - 33.0 pg    MCHC 32.9 31.5 - 36.5 g/dL    RDW 13.7 10.0 - 15.0 %    Platelet Count 241 150 - 450 10e3/uL      EKG: sinus bradycardia, normal axis, normal intervals, no acute ST/T changes c/w ischemia, no LVH by voltage criteria, unchanged from previous tracings    Revised Cardiac Risk Index (RCRI)  The  patient has the following serious cardiovascular risks for perioperative complications:   - No serious cardiac risks = 0 points     RCRI Interpretation: 0 points: Class I (very low risk - 0.4% complication rate)         Signed Electronically by: Meche Dougherty MD  Copy of this evaluation report is provided to requesting physician.

## 2024-06-17 NOTE — PATIENT INSTRUCTIONS
How to Take Your Medication Before Surgery  Preoperative Medication Instructions   Antiplatelet or Anticoagulation Medication Instructions   - Patient is on no antiplatelet or anticoagulation medications.    Additional Medication Instructions  Take all scheduled medications on the day of surgery EXCEPT for modifications listed below:   - ACE/ARB: Continue without modification (e.g., MAC anesthesia, neurosurgery, spine surgery, heart failure, or labile hypertension with risk of hypertension).   - Calcium Channel Blockers: May be continued on the day of surgery.   - Diuretics: DO NOT TAKE on the day of surgery.   - Statins: Continue taking on the day of surgery.    - Herbal medications and vitamins: DO NOT TAKE 14 days prior to surgery.   - SSRIs, SNRIs, TCAs, Antipsychotics: Continue without modification.

## 2024-10-18 DIAGNOSIS — M1A.4720 CHRONIC GOUT DUE TO OTHER SECONDARY CAUSE INVOLVING TOE OF LEFT FOOT WITHOUT TOPHUS: ICD-10-CM

## 2024-10-18 RX ORDER — ALLOPURINOL 100 MG/1
200 TABLET ORAL DAILY
Qty: 180 TABLET | Refills: 1 | Status: SHIPPED | OUTPATIENT
Start: 2024-10-18

## 2024-10-18 NOTE — TELEPHONE ENCOUNTER
Allopurinol  Last Written Prescription Date: 1/15/24  Last Fill Quantity: 180 # of Refills: 2  Last Office Visit: 6/17/24

## 2024-12-02 DIAGNOSIS — I10 ESSENTIAL HYPERTENSION, BENIGN: ICD-10-CM

## 2024-12-02 RX ORDER — LISINOPRIL 20 MG/1
TABLET ORAL
Qty: 90 TABLET | Refills: 1 | Status: SHIPPED | OUTPATIENT
Start: 2024-12-02

## 2024-12-02 NOTE — TELEPHONE ENCOUNTER
Most recent blood pressure under 140/90 in past 12 months- Clinicial or Patient Reported        BP Readings from Last 3 Encounters:   06/17/24 (!) 144/70   06/11/24 128/60   05/22/24 115/63

## 2024-12-02 NOTE — TELEPHONE ENCOUNTER
Lisinopril  Last Written Prescription Date: 5/31/24  Last Fill Quantity: 90 # of Refills: 1  Last Office Visit: 6/7/24

## 2025-02-06 DIAGNOSIS — F34.1 DYSTHYMIC DISORDER: ICD-10-CM

## 2025-02-06 DIAGNOSIS — F41.1 GAD (GENERALIZED ANXIETY DISORDER): ICD-10-CM

## 2025-02-06 NOTE — TELEPHONE ENCOUNTER
Whitney      Last Written Prescription Date:  2/5/24  Last Fill Quantity: 90,   # refills: 3  Last Office Visit: 6/17/24  Future Office visit:       Routing refill request to provider for review/approval because:

## 2025-02-10 NOTE — TELEPHONE ENCOUNTER
Needs follow-up, hasn't been seen for med review, can be with Violeta and then needs to schedule physical with me in a few months

## 2025-02-10 NOTE — TELEPHONE ENCOUNTER
Routing refill request to provider for review/approval because:  SSRIs Protocol Failed    Rerun Protocol (2/6/2025 9:15 AM)    PHQ-9 score less than 5 in past 6 months    Please review last PHQ-9 score.     SILVIA-7 score of less than 5 in past 6 months.    Please review last SILVIA-7 score.         4/10/2023     9:51 AM 9/13/2023     9:37 AM 5/7/2024    11:27 AM   PHQ   PHQ-9 Total Score 1 1 4   Q9: Thoughts of better off dead/self-harm past 2 weeks Not at all  Not at all Not at all       Proxy-reported            4/10/2023     9:53 AM 9/13/2023     9:00 AM 5/7/2024    11:27 AM   SILVIA-7 SCORE   Total Score 1 (minimal anxiety)  1 (minimal anxiety)   Total Score 1 2 1

## 2025-02-11 NOTE — TELEPHONE ENCOUNTER
Attempt # 2  Outcome: Left Message   Comment: LVM for patient to call to schedule med review with Violeta and then a few months later a physical with Dr. Dougherty.

## 2025-02-12 ENCOUNTER — OFFICE VISIT (OUTPATIENT)
Dept: FAMILY MEDICINE | Facility: OTHER | Age: 75
End: 2025-02-12
Attending: STUDENT IN AN ORGANIZED HEALTH CARE EDUCATION/TRAINING PROGRAM
Payer: COMMERCIAL

## 2025-02-12 VITALS
BODY MASS INDEX: 32.56 KG/M2 | RESPIRATION RATE: 18 BRPM | DIASTOLIC BLOOD PRESSURE: 58 MMHG | OXYGEN SATURATION: 96 % | HEIGHT: 66 IN | HEART RATE: 62 BPM | WEIGHT: 202.6 LBS | TEMPERATURE: 97.3 F | SYSTOLIC BLOOD PRESSURE: 110 MMHG

## 2025-02-12 DIAGNOSIS — R74.8 ELEVATED ALKALINE PHOSPHATASE LEVEL: ICD-10-CM

## 2025-02-12 DIAGNOSIS — E78.2 MIXED HYPERLIPIDEMIA: ICD-10-CM

## 2025-02-12 DIAGNOSIS — N18.30 STAGE 3 CHRONIC KIDNEY DISEASE, UNSPECIFIED WHETHER STAGE 3A OR 3B CKD (H): ICD-10-CM

## 2025-02-12 DIAGNOSIS — F41.1 GAD (GENERALIZED ANXIETY DISORDER): Primary | ICD-10-CM

## 2025-02-12 DIAGNOSIS — D64.9 LOW HEMOGLOBIN: ICD-10-CM

## 2025-02-12 DIAGNOSIS — E78.1 HYPERTRIGLYCERIDEMIA: ICD-10-CM

## 2025-02-12 DIAGNOSIS — I10 ESSENTIAL HYPERTENSION, BENIGN: ICD-10-CM

## 2025-02-12 DIAGNOSIS — M1A.4720 CHRONIC GOUT DUE TO OTHER SECONDARY CAUSE INVOLVING TOE OF LEFT FOOT WITHOUT TOPHUS: ICD-10-CM

## 2025-02-12 LAB
ALBUMIN SERPL BCG-MCNC: 4.3 G/DL (ref 3.5–5.2)
ALP SERPL-CCNC: 164 U/L (ref 40–150)
ALT SERPL W P-5'-P-CCNC: 26 U/L (ref 0–70)
ANION GAP SERPL CALCULATED.3IONS-SCNC: 10 MMOL/L (ref 7–15)
AST SERPL W P-5'-P-CCNC: 31 U/L (ref 0–45)
BILIRUB SERPL-MCNC: 0.6 MG/DL
BUN SERPL-MCNC: 26.7 MG/DL (ref 8–23)
CALCIUM SERPL-MCNC: 9.5 MG/DL (ref 8.8–10.4)
CHLORIDE SERPL-SCNC: 104 MMOL/L (ref 98–107)
CHOLEST SERPL-MCNC: 174 MG/DL
CREAT SERPL-MCNC: 1.32 MG/DL (ref 0.67–1.17)
EGFRCR SERPLBLD CKD-EPI 2021: 56 ML/MIN/1.73M2
ERYTHROCYTE [DISTWIDTH] IN BLOOD BY AUTOMATED COUNT: 13.5 % (ref 10–15)
FASTING STATUS PATIENT QL REPORTED: NO
FASTING STATUS PATIENT QL REPORTED: NO
FERRITIN SERPL-MCNC: 371 NG/ML (ref 31–409)
GLUCOSE SERPL-MCNC: 103 MG/DL (ref 70–99)
HCO3 SERPL-SCNC: 26 MMOL/L (ref 22–29)
HCT VFR BLD AUTO: 35.4 % (ref 40–53)
HDLC SERPL-MCNC: 44 MG/DL
HGB BLD-MCNC: 11.9 G/DL (ref 13.3–17.7)
IRON BINDING CAPACITY (ROCHE): 257 UG/DL (ref 240–430)
IRON SATN MFR SERPL: 29 % (ref 15–46)
IRON SERPL-MCNC: 74 UG/DL (ref 61–157)
LDLC SERPL CALC-MCNC: 75 MG/DL
MCH RBC QN AUTO: 33.3 PG (ref 26.5–33)
MCHC RBC AUTO-ENTMCNC: 33.6 G/DL (ref 31.5–36.5)
MCV RBC AUTO: 99 FL (ref 78–100)
NONHDLC SERPL-MCNC: 130 MG/DL
PLATELET # BLD AUTO: 210 10E3/UL (ref 150–450)
POTASSIUM SERPL-SCNC: 5 MMOL/L (ref 3.4–5.3)
PROT SERPL-MCNC: 7.6 G/DL (ref 6.4–8.3)
RBC # BLD AUTO: 3.57 10E6/UL (ref 4.4–5.9)
SODIUM SERPL-SCNC: 140 MMOL/L (ref 135–145)
TRIGL SERPL-MCNC: 274 MG/DL
URATE SERPL-MCNC: 5.6 MG/DL (ref 3.4–7)
WBC # BLD AUTO: 6.5 10E3/UL (ref 4–11)

## 2025-02-12 PROCEDURE — G0463 HOSPITAL OUTPT CLINIC VISIT: HCPCS

## 2025-02-12 PROCEDURE — 84550 ASSAY OF BLOOD/URIC ACID: CPT | Mod: ZL | Performed by: STUDENT IN AN ORGANIZED HEALTH CARE EDUCATION/TRAINING PROGRAM

## 2025-02-12 PROCEDURE — 85014 HEMATOCRIT: CPT | Mod: ZL | Performed by: STUDENT IN AN ORGANIZED HEALTH CARE EDUCATION/TRAINING PROGRAM

## 2025-02-12 PROCEDURE — 80061 LIPID PANEL: CPT | Mod: ZL | Performed by: STUDENT IN AN ORGANIZED HEALTH CARE EDUCATION/TRAINING PROGRAM

## 2025-02-12 PROCEDURE — 80053 COMPREHEN METABOLIC PANEL: CPT | Mod: ZL | Performed by: STUDENT IN AN ORGANIZED HEALTH CARE EDUCATION/TRAINING PROGRAM

## 2025-02-12 PROCEDURE — 36415 COLL VENOUS BLD VENIPUNCTURE: CPT | Mod: ZL | Performed by: STUDENT IN AN ORGANIZED HEALTH CARE EDUCATION/TRAINING PROGRAM

## 2025-02-12 PROCEDURE — 82247 BILIRUBIN TOTAL: CPT | Mod: ZL | Performed by: STUDENT IN AN ORGANIZED HEALTH CARE EDUCATION/TRAINING PROGRAM

## 2025-02-12 PROCEDURE — 82728 ASSAY OF FERRITIN: CPT | Mod: ZL | Performed by: STUDENT IN AN ORGANIZED HEALTH CARE EDUCATION/TRAINING PROGRAM

## 2025-02-12 PROCEDURE — 83550 IRON BINDING TEST: CPT | Mod: ZL | Performed by: STUDENT IN AN ORGANIZED HEALTH CARE EDUCATION/TRAINING PROGRAM

## 2025-02-12 RX ORDER — CITALOPRAM HYDROBROMIDE 20 MG/1
20 TABLET ORAL DAILY
Qty: 90 TABLET | Refills: 0 | Status: SHIPPED | OUTPATIENT
Start: 2025-02-12

## 2025-02-12 ASSESSMENT — ANXIETY QUESTIONNAIRES
5. BEING SO RESTLESS THAT IT IS HARD TO SIT STILL: NOT AT ALL
6. BECOMING EASILY ANNOYED OR IRRITABLE: NEARLY EVERY DAY
3. WORRYING TOO MUCH ABOUT DIFFERENT THINGS: NOT AT ALL
7. FEELING AFRAID AS IF SOMETHING AWFUL MIGHT HAPPEN: NOT AT ALL
8. IF YOU CHECKED OFF ANY PROBLEMS, HOW DIFFICULT HAVE THESE MADE IT FOR YOU TO DO YOUR WORK, TAKE CARE OF THINGS AT HOME, OR GET ALONG WITH OTHER PEOPLE?: SOMEWHAT DIFFICULT
GAD7 TOTAL SCORE: 3
2. NOT BEING ABLE TO STOP OR CONTROL WORRYING: NOT AT ALL
7. FEELING AFRAID AS IF SOMETHING AWFUL MIGHT HAPPEN: NOT AT ALL
GAD7 TOTAL SCORE: 3
IF YOU CHECKED OFF ANY PROBLEMS ON THIS QUESTIONNAIRE, HOW DIFFICULT HAVE THESE PROBLEMS MADE IT FOR YOU TO DO YOUR WORK, TAKE CARE OF THINGS AT HOME, OR GET ALONG WITH OTHER PEOPLE: SOMEWHAT DIFFICULT
1. FEELING NERVOUS, ANXIOUS, OR ON EDGE: NOT AT ALL
GAD7 TOTAL SCORE: 3
4. TROUBLE RELAXING: NOT AT ALL

## 2025-02-12 ASSESSMENT — PATIENT HEALTH QUESTIONNAIRE - PHQ9
SUM OF ALL RESPONSES TO PHQ QUESTIONS 1-9: 2
10. IF YOU CHECKED OFF ANY PROBLEMS, HOW DIFFICULT HAVE THESE PROBLEMS MADE IT FOR YOU TO DO YOUR WORK, TAKE CARE OF THINGS AT HOME, OR GET ALONG WITH OTHER PEOPLE: SOMEWHAT DIFFICULT
SUM OF ALL RESPONSES TO PHQ QUESTIONS 1-9: 2

## 2025-02-12 ASSESSMENT — ENCOUNTER SYMPTOMS: NERVOUS/ANXIOUS: 1

## 2025-02-12 ASSESSMENT — PAIN SCALES - GENERAL: PAINLEVEL_OUTOF10: MODERATE PAIN (4)

## 2025-02-12 NOTE — PATIENT INSTRUCTIONS
1.) Check at home if you are taking vitamins  - Prilosec( Omeprazole)  -Hygroton      2.) Increase water intake

## 2025-02-12 NOTE — PROGRESS NOTES
"  Assessment & Plan     SILVIA (generalized anxiety disorder)  Mood is stable. Denies any increased anxiety. Will continue to monitor.     Essential hypertension, benign  Stable. Discussed continuing with current medications. Repeat labs at yearly physical in 3 months.   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 Will continue to monitor.   - Uric acid; Future  - Uric acid    Mixed hyperlipidemia  Will continue to monitor. Is tolerating current medication well. Will repeat labs in 3 months at yearly physical.   - Comprehensive metabolic panel (BMP + Alb, Alk Phos, ALT, AST, Total. Bili, TP); Future  - Lipid Profile (Chol, Trig, HDL, LDL calc); Future  - Lipid Profile (Chol, Trig, HDL, LDL calc)  - Comprehensive metabolic panel (BMP + Alb, Alk Phos, ALT, AST, Total. Bili, TP)    Hypertriglyceridemia  Continue to be elevated. Recommended reduction in sugar and carbohydrate intake. Advised increase in water. Repeat fasting labs in 3 months at physical.     Low hemoglobin  Stable with trend over the last year. Iron and Ferritin are normal. Will continue to monitor.   - Iron and iron binding capacity; Future  - Ferritin; Future  - Iron and iron binding capacity  - Ferritin    Elevated alkaline phosphatase level  Markedly increased level today.  Will recheck with a fasting lab within one week.  - Comprehensive metabolic panel (BMP + Alb, Alk Phos, ALT, AST, Total. Bili, TP); Future    Stage 3 chronic kidney disease, unspecified whether stage 3a or 3b CKD (H)  Will continue to monitor.   -Albumin Random Urine Quantitative with Creat Ratio;future      BMI  Estimated body mass index is 32.7 kg/m  as calculated from the following:    Height as of this encounter: 1.676 m (5' 6\").    Weight as of this encounter: 91.9 kg (202 lb 9.6 oz).         FUTURE LABS:       - Schedule a fasting blood draw within the next week.  FUTURE " APPOINTMENTS:       - Follow-up visit in 3 months for yearly physical  See Patient Instructions    No follow-ups on file.    Subjective   Oswald is a 74 year old, presenting for the following health issues:  Anxiety, Hypertension, Kidney Problem, Lipids, and Gastrophageal Reflux  Reports to be feeling ok. Mood is stable. Denies any worsening anxiety or depression. Does report falling a couple months ago and hit his chest on a railing at a hockey game. Felt it was bruised and it has continued to improve with time. Notes over the past 2-3 months has noticed first bowel movement of the daily is loose. When given bristol scale chart identified as 6. Does not have repeat episodes during the day. Denies any abdominal pain, melena or hematochezia. Denies any new chest pain, shortness of breath, abdominal pain or nausea. No recent fevers or sick contacts. Notes diet does not include many fruits or vegetables. Notes does drink coffee, green tea and 1-2 glasses of water a day. Noted exercise is daily for one hour.       2/12/2025    10:56 AM   Additional Questions   Roomed by Shagufta CORONADO   Accompanied by self         2/12/2025    10:56 AM   Patient Reported Additional Medications   Patient reports taking the following new medications none     Anxiety    Kidney Problem    History of Present Illness       CKD: He uses over the counter pain medication, including acetaminophen, two times daily.    Hyperlipidemia:  He presents for follow up of hyperlipidemia.   He is taking medication to lower cholesterol. He is not having myalgia or other side effects to statin medications.    Hypertension: He presents for follow up of hypertension.  He does check blood pressure  regularly outside of the clinic. Outpatient blood pressures have not been over 140/90. He follows a low salt diet.     Reason for visit:  Med review    He eats 2-3 servings of fruits and vegetables daily.He consumes 2 sweetened beverage(s) daily.He exercises with enough  effort to increase his heart rate 60 or more minutes per day.  He exercises with enough effort to increase his heart rate 7 days per week.   He is taking medications regularly.       Hyperlipidemia Follow-Up    Are you regularly taking any medication or supplement to lower your cholesterol?   Yes- Simvastatin 20 mg   Are you having muscle aches or other side effects that you think could be caused by your cholesterol lowering medication?  No    Hypertension Follow-up    Do you check your blood pressure regularly outside of the clinic? Yes   Are you following a low salt diet? Yes  Are your blood pressures ever more than 140 on the top number (systolic) OR more   than 90 on the bottom number (diastolic), for example 140/90? No    Anxiety   How are you doing with your anxiety since your last visit? Improved   Are you having other symptoms that might be associated with anxiety? No  Have you had a significant life event? No   Are you feeling depressed? No  Do you have any concerns with your use of alcohol or other drugs? Yes:  patient states he drinks a few beers a day, sometimes a few glasses of wine or occasionally hard liquor    Social History     Tobacco Use    Smoking status: Former     Current packs/day: 0.00     Average packs/day: 0.1 packs/day for 2.0 years (0.2 ttl pk-yrs)     Types: Cigarettes     Start date:      Quit date: 1968     Years since quittin.1    Smokeless tobacco: Former    Tobacco comments:     no passive exposure, tried to quit-yes   Vaping Use    Vaping status: Never Used   Substance Use Topics    Alcohol use: Yes     Comment: daily     Drug use: No         2023     9:00 AM 2024    11:27 AM 2025    10:54 AM   SILVIA-7 SCORE   Total Score  1 (minimal anxiety) 3 (minimal anxiety)   Total Score 2 1 3        Patient-reported         2023     9:37 AM 2024    11:27 AM 2025    10:53 AM   PHQ   PHQ-9 Total Score 1 4 2    Q9: Thoughts of better off dead/self-harm past 2  weeks Not at all Not at all Not at all       Patient-reported         2/12/2025    10:53 AM   Last PHQ-9   1.  Little interest or pleasure in doing things 0   2.  Feeling down, depressed, or hopeless 0   3.  Trouble falling or staying asleep, or sleeping too much 1   4.  Feeling tired or having little energy 0   5.  Poor appetite or overeating 1   6.  Feeling bad about yourself 0   7.  Trouble concentrating 0   8.  Moving slowly or restless 0   Q9: Thoughts of better off dead/self-harm past 2 weeks 0   PHQ-9 Total Score 2        Patient-reported         2/12/2025    10:54 AM   SILVIA-7    1. Feeling nervous, anxious, or on edge 0   2. Not being able to stop or control worrying 0   3. Worrying too much about different things 0   4. Trouble relaxing 0   5. Being so restless that it is hard to sit still 0   6. Becoming easily annoyed or irritable 3   7. Feeling afraid, as if something awful might happen 0   SILVIA-7 Total Score 3    If you checked any problems, how difficult have they made it for you to do your work, take care of things at home, or get along with other people? Somewhat difficult       Patient-reported     Chronic Kidney Disease Follow-up    Do you take any over the counter pain medicine?: Yes  What over the counter medicine are you taking for your pain?:  Acetaminophen    How often do you take this medicine?:  Two times daily  How many servings of fruits and vegetables do you eat daily?  2-3  On average, how many sweetened beverages do you drink each day (Examples: soda, juice, sweet tea, etc.  Do NOT count diet or artificially sweetened beverages)?   2  How many days per week do you exercise enough to make your heart beat faster? 7  How many minutes a day do you exercise enough to make your heart beat faster? 60 or more  How many days per week do you miss taking your medication? 0        Review of Systems  Constitutional, HEENT, cardiovascular, pulmonary, GI, , musculoskeletal, neuro, skin, endocrine and  "psych systems are negative, except as otherwise noted.      Objective    /58 (BP Location: Right arm, Patient Position: Sitting, Cuff Size: Adult Large)   Pulse 62   Temp 97.3  F (36.3  C) (Tympanic)   Resp 18   Ht 1.676 m (5' 6\")   Wt 91.9 kg (202 lb 9.6 oz)   SpO2 96%   BMI 32.70 kg/m    Body mass index is 32.7 kg/m .  Physical Exam   GENERAL: alert and no distress  EYES: Eyes grossly normal to inspection, PERRL and conjunctivae and sclerae normal  NECK: no adenopathy, no asymmetry, masses, or scars  RESP: lungs clear to auscultation - no rales, rhonchi or wheezes  CV: regular rate and rhythm, normal S1 S2, no S3 or S4, no murmur, click or rub, no peripheral edema  ABDOMEN: soft, nontender, no hepatosplenomegaly, no masses and bowel sounds normal  MS: no gross musculoskeletal defects noted, no edema  SKIN: no suspicious lesions or rashes  NEURO: Normal strength and tone, mentation intact and speech normal  PSYCH: mentation appears normal, affect normal/bright    Results for orders placed or performed in visit on 02/12/25 (from the past 24 hours)   Uric acid   Result Value Ref Range    Uric Acid 5.6 3.4 - 7.0 mg/dL   Lipid Profile (Chol, Trig, HDL, LDL calc)   Result Value Ref Range    Cholesterol 174 <200 mg/dL    Triglycerides 274 (H) <150 mg/dL    Direct Measure HDL 44 >=40 mg/dL    LDL Cholesterol Calculated 75 <100 mg/dL    Non HDL Cholesterol 130 (H) <130 mg/dL    Patient Fasting > 8hrs? No     Narrative    Cholesterol  Desirable: < 200 mg/dL  Borderline High: 200 - 239 mg/dL  High: >= 240 mg/dL    Triglycerides  Normal: < 150 mg/dL  Borderline High: 150 - 199 mg/dL  High: 200-499 mg/dL  Very High: >= 500 mg/dL    Direct Measure HDL  Female: >= 50 mg/dL   Male: >= 40 mg/dL    LDL Cholesterol  Desirable: < 100 mg/dL  Above Desirable: 100 - 129 mg/dL   Borderline High: 130 - 159 mg/dL   High:  160 - 189 mg/dL   Very High: >= 190 mg/dL    Non HDL Cholesterol  Desirable: < 130 mg/dL  Above Desirable: " 130 - 159 mg/dL  Borderline High: 160 - 189 mg/dL  High: 190 - 219 mg/dL  Very High: >= 220 mg/dL   Comprehensive metabolic panel (BMP + Alb, Alk Phos, ALT, AST, Total. Bili, TP)   Result Value Ref Range    Sodium 140 135 - 145 mmol/L    Potassium 5.0 3.4 - 5.3 mmol/L    Carbon Dioxide (CO2) 26 22 - 29 mmol/L    Anion Gap 10 7 - 15 mmol/L    Urea Nitrogen 26.7 (H) 8.0 - 23.0 mg/dL    Creatinine 1.32 (H) 0.67 - 1.17 mg/dL    GFR Estimate 56 (L) >60 mL/min/1.73m2    Calcium 9.5 8.8 - 10.4 mg/dL    Chloride 104 98 - 107 mmol/L    Glucose 103 (H) 70 - 99 mg/dL    Alkaline Phosphatase 164 (H) 40 - 150 U/L    AST 31 0 - 45 U/L    ALT 26 0 - 70 U/L    Protein Total 7.6 6.4 - 8.3 g/dL    Albumin 4.3 3.5 - 5.2 g/dL    Bilirubin Total 0.6 <=1.2 mg/dL    Patient Fasting > 8hrs? No    CBC with platelets   Result Value Ref Range    WBC Count 6.5 4.0 - 11.0 10e3/uL    RBC Count 3.57 (L) 4.40 - 5.90 10e6/uL    Hemoglobin 11.9 (L) 13.3 - 17.7 g/dL    Hematocrit 35.4 (L) 40.0 - 53.0 %    MCV 99 78 - 100 fL    MCH 33.3 (H) 26.5 - 33.0 pg    MCHC 33.6 31.5 - 36.5 g/dL    RDW 13.5 10.0 - 15.0 %    Platelet Count 210 150 - 450 10e3/uL   Iron and iron binding capacity   Result Value Ref Range    Iron 74 61 - 157 ug/dL    Iron Binding Capacity 257 240 - 430 ug/dL    Iron Sat Index 29 15 - 46 %   Ferritin   Result Value Ref Range    Ferritin 371 31 - 409 ng/mL         60 minutes spent by me on the date of the encounter doing chart review, history and exam, documentation and further activities per the note.    Signed Electronically by: Violeta Dow PA-C

## 2025-02-13 DIAGNOSIS — N18.30 STAGE 3 CHRONIC KIDNEY DISEASE, UNSPECIFIED WHETHER STAGE 3A OR 3B CKD (H): ICD-10-CM

## 2025-02-13 DIAGNOSIS — R74.8 ELEVATED ALKALINE PHOSPHATASE LEVEL: ICD-10-CM

## 2025-02-14 ENCOUNTER — APPOINTMENT (OUTPATIENT)
Dept: LAB | Facility: OTHER | Age: 75
End: 2025-02-14
Payer: COMMERCIAL

## 2025-02-15 LAB — GGT SERPL-CCNC: 36 U/L (ref 8–61)

## 2025-02-20 ENCOUNTER — OFFICE VISIT (OUTPATIENT)
Dept: FAMILY MEDICINE | Facility: OTHER | Age: 75
End: 2025-02-20
Attending: STUDENT IN AN ORGANIZED HEALTH CARE EDUCATION/TRAINING PROGRAM
Payer: COMMERCIAL

## 2025-02-20 VITALS
BODY MASS INDEX: 32.51 KG/M2 | HEART RATE: 58 BPM | DIASTOLIC BLOOD PRESSURE: 60 MMHG | SYSTOLIC BLOOD PRESSURE: 102 MMHG | WEIGHT: 202.3 LBS | OXYGEN SATURATION: 97 % | HEIGHT: 66 IN | TEMPERATURE: 97.4 F

## 2025-02-20 DIAGNOSIS — R74.8 ELEVATED ALKALINE PHOSPHATASE LEVEL: Primary | ICD-10-CM

## 2025-02-20 DIAGNOSIS — E55.9 HYPOVITAMINOSIS D: ICD-10-CM

## 2025-02-20 LAB
ALBUMIN SERPL BCG-MCNC: 4.4 G/DL (ref 3.5–5.2)
ALP SERPL-CCNC: 154 U/L (ref 40–150)
ALT SERPL W P-5'-P-CCNC: 30 U/L (ref 0–70)
ANION GAP SERPL CALCULATED.3IONS-SCNC: 11 MMOL/L (ref 7–15)
AST SERPL W P-5'-P-CCNC: 35 U/L (ref 0–45)
BILIRUB SERPL-MCNC: 0.7 MG/DL
BUN SERPL-MCNC: 24.6 MG/DL (ref 8–23)
CALCIUM SERPL-MCNC: 9.7 MG/DL (ref 8.8–10.4)
CHLORIDE SERPL-SCNC: 106 MMOL/L (ref 98–107)
CREAT SERPL-MCNC: 1.31 MG/DL (ref 0.67–1.17)
EGFRCR SERPLBLD CKD-EPI 2021: 56 ML/MIN/1.73M2
GLUCOSE SERPL-MCNC: 105 MG/DL (ref 70–99)
HCO3 SERPL-SCNC: 25 MMOL/L (ref 22–29)
POTASSIUM SERPL-SCNC: 4.6 MMOL/L (ref 3.4–5.3)
PROT SERPL-MCNC: 8 G/DL (ref 6.4–8.3)
PTH-INTACT SERPL-MCNC: 34 PG/ML (ref 15–65)
SODIUM SERPL-SCNC: 142 MMOL/L (ref 135–145)
VIT D+METAB SERPL-MCNC: 30 NG/ML (ref 20–50)

## 2025-02-20 PROCEDURE — 82306 VITAMIN D 25 HYDROXY: CPT | Mod: ZL | Performed by: STUDENT IN AN ORGANIZED HEALTH CARE EDUCATION/TRAINING PROGRAM

## 2025-02-20 PROCEDURE — G0463 HOSPITAL OUTPT CLINIC VISIT: HCPCS

## 2025-02-20 PROCEDURE — 83970 ASSAY OF PARATHORMONE: CPT | Mod: ZL | Performed by: STUDENT IN AN ORGANIZED HEALTH CARE EDUCATION/TRAINING PROGRAM

## 2025-02-20 PROCEDURE — 36415 COLL VENOUS BLD VENIPUNCTURE: CPT | Mod: ZL | Performed by: STUDENT IN AN ORGANIZED HEALTH CARE EDUCATION/TRAINING PROGRAM

## 2025-02-20 PROCEDURE — 82040 ASSAY OF SERUM ALBUMIN: CPT | Mod: ZL | Performed by: STUDENT IN AN ORGANIZED HEALTH CARE EDUCATION/TRAINING PROGRAM

## 2025-02-20 ASSESSMENT — PAIN SCALES - GENERAL: PAINLEVEL_OUTOF10: NO PAIN (0)

## 2025-02-20 NOTE — PROGRESS NOTES
"  Assessment & Plan     Elevated alkaline phosphatase level  Has had continued elevated alkaline phosphatase levels. Obtained a GTT within the last two weeks, which was normal. Given normal GTT, elevated alkaline phosphatase lab is likely bone related. Awaiting results from parathyroid and vitamin D.  Patient denies any new pain. Review of the chart noted imaging of the right leg in 2015 that suggested previously healed fibula fracture. Patient had not recollection of this injury. Given continued elevated alkaline phosphatase possible differential includes osteomalacia and paget disease of the bone. Discussed with patient that if labs continued to be elevated would refer to endocrinology for further evaluation. Patient is agreeable to this plan and all questions were answered with apparent satisfaction.   - Comprehensive metabolic panel (BMP + Alb, Alk Phos, ALT, AST, Total. Bili, TP); Future  - Parathyroid Hormone Intact; Future  - Comprehensive metabolic panel (BMP + Alb, Alk Phos, ALT, AST, Total. Bili, TP)  - Parathyroid Hormone Intact  - Adult Endocrinology  Referral; Future    Hypovitaminosis D  Has previously had low vitamin D. Further recommendation pending results of the lab.   - Vitamin D Deficiency; Future  - Vitamin D Deficiency          BMI  Estimated body mass index is 32.65 kg/m  as calculated from the following:    Height as of this encounter: 1.676 m (5' 6\").    Weight as of this encounter: 91.8 kg (202 lb 4.8 oz).       No follow-ups on file.    Marlyn Akers is a 74 year old, presenting for the following health issues:  Results    No known history of osteoporosis. No history of recent fractures.  Younger sister is having dental concerns. Does endorse tenderness in his knees and hips. Has history of meniscal injury in the left knee and bilateral total hip replacements. Has noted soreness in both knees and in his hips while curling. Pain is 3/10 while doing activities. Previously was " "taking Tylenol 1000mg BID to help with pain. Has stopped taking the Tylenol and has not had any pain return.  At rest denies. Notes pain is notable only with gout flare. Has not had any recent gout flares.     Has not had any new chest pain, shortness of breath, abdominal pain, nausea, vomiting or diarrhea. No recent fevers or sick contacts. Appetite is normal.     Alcohol intake is 2 beers 2-3x a week.  Denies any nicotine or marijuana use.       2/20/2025     7:53 AM   Additional Questions   Roomed by Loan CORONADO   Accompanied by self     HPI     Concern - discuss results   Onset: lab tests were done on 02/14/2025 and 02/12/2025  Description: 02/12 patient was not fasting 02/14 patient was fasting for lab draw.   I      Review of Systems  Constitutional, HEENT, cardiovascular, pulmonary, GI, , musculoskeletal, neuro, skin, endocrine and psych systems are negative, except as otherwise noted.      Objective    /60 (BP Location: Left arm, Patient Position: Sitting, Cuff Size: Adult Regular)   Pulse 58   Temp 97.4  F (36.3  C) (Tympanic)   Ht 1.676 m (5' 6\")   Wt 91.8 kg (202 lb 4.8 oz)   SpO2 97%   BMI 32.65 kg/m    Body mass index is 32.65 kg/m .  Physical Exam   GENERAL: alert and no distress  EYES: Eyes grossly normal to inspection  NECK: no adenopathy, no asymmetry, masses, or scars  RESP: lungs clear to auscultation - no rales, rhonchi or wheezes  CV: regular rate and rhythm, normal S1 S2, no S3 or S4, no murmur, click or rub, no peripheral edema  ABDOMEN: soft, nontender, no masses and bowel sounds normal  MS: no gross musculoskeletal defects noted, no edema  NEURO: Normal strength and tone, mentation intact and speech normal  PSYCH: mentation appears normal, affect normal/bright    Results for orders placed or performed in visit on 02/20/25 (from the past 24 hours)   Comprehensive metabolic panel (BMP + Alb, Alk Phos, ALT, AST, Total. Bili, TP)   Result Value Ref Range    Sodium 142 135 - 145 " mmol/L    Potassium 4.6 3.4 - 5.3 mmol/L    Carbon Dioxide (CO2) 25 22 - 29 mmol/L    Anion Gap 11 7 - 15 mmol/L    Urea Nitrogen 24.6 (H) 8.0 - 23.0 mg/dL    Creatinine 1.31 (H) 0.67 - 1.17 mg/dL    GFR Estimate 56 (L) >60 mL/min/1.73m2    Calcium 9.7 8.8 - 10.4 mg/dL    Chloride 106 98 - 107 mmol/L    Glucose 105 (H) 70 - 99 mg/dL    Alkaline Phosphatase 154 (H) 40 - 150 U/L    AST 35 0 - 45 U/L    ALT 30 0 - 70 U/L    Protein Total 8.0 6.4 - 8.3 g/dL    Albumin 4.4 3.5 - 5.2 g/dL    Bilirubin Total 0.7 <=1.2 mg/dL       45 minutes spent by me on the date of the encounter doing chart review, history and exam, documentation and further activities per the note.      Signed Electronically by: Violeta Dow PA-C

## 2025-02-24 DIAGNOSIS — I10 ESSENTIAL HYPERTENSION, BENIGN: ICD-10-CM

## 2025-02-24 NOTE — TELEPHONE ENCOUNTER
Amlodipine 10 MG       Last Written Prescription Date:  5/29/2024  Last Fill Quantity: 90,   # refills: 2  Last Office Visit: 2/20/2025  Future Office visit:    Next 5 appointments (look out 90 days)      May 19, 2025 1:15 PM  (Arrive by 1:00 PM)  Adult Preventative Visit with Meche Dougherty MD  [facility] [place] ([facility] [place] ) [address]  [place] [address]  [phone]             Routing refill request to provider for review/approval because:  Drug not on the [facility], P or [facility] refill protocol or controlled substance    Performed Resulted

## 2025-02-25 DIAGNOSIS — E78.2 MIXED HYPERLIPIDEMIA: ICD-10-CM

## 2025-02-25 RX ORDER — AMLODIPINE BESYLATE 10 MG/1
10 TABLET ORAL DAILY
Qty: 90 TABLET | Refills: 2 | Status: SHIPPED | OUTPATIENT
Start: 2025-02-25

## 2025-02-26 DIAGNOSIS — E78.2 MIXED HYPERLIPIDEMIA: ICD-10-CM

## 2025-02-26 RX ORDER — SIMVASTATIN 20 MG
20 TABLET ORAL
Qty: 90 TABLET | Refills: 0 | Status: SHIPPED | OUTPATIENT
Start: 2025-02-26

## 2025-02-26 RX ORDER — SIMVASTATIN 20 MG
20 TABLET ORAL
Qty: 90 TABLET | Refills: 3 | Status: SHIPPED | OUTPATIENT
Start: 2025-02-26 | End: 2025-02-26

## 2025-03-06 DIAGNOSIS — I10 ESSENTIAL HYPERTENSION, BENIGN: ICD-10-CM

## 2025-03-06 RX ORDER — CHLORTHALIDONE 25 MG/1
25 TABLET ORAL DAILY
Qty: 90 TABLET | Refills: 2 | Status: SHIPPED | OUTPATIENT
Start: 2025-03-06

## 2025-03-06 NOTE — TELEPHONE ENCOUNTER
CHLORTHALIDONE 25 MG TABLET         Last Written Prescription Date:  6/17/24  Last Fill Quantity: 90,   # refills: 1  Last Office Visit: 2/20/25  Future Office visit:       Routing refill request to provider for review/approval because:    Diuretics (Including Combos) Protocol Usviom2003/06/2025 09:30 AM   Protocol Details Has GFR on file in past 12 months and most recent value is normal     GFR Estimate   Date Value Ref Range Status   02/20/2025 56 (L) >60 mL/min/1.73m2 Final     Comment:     eGFR calculated using 2021 CKD-EPI equation.   03/16/2021 75 >60 mL/min/[1.73_m2] Final     Comment:     Non  GFR Calc  Starting 12/18/2018, serum creatinine based estimated GFR (eGFR) will be   calculated using the Chronic Kidney Disease Epidemiology Collaboration   (CKD-EPI) equation.

## 2025-03-20 DIAGNOSIS — J43.9 PULMONARY EMPHYSEMA, UNSPECIFIED EMPHYSEMA TYPE (H): ICD-10-CM

## 2025-03-20 NOTE — TELEPHONE ENCOUNTER
montelukast (SINGULAIR) 10 MG tablet 90 tablet 2 6/14/2024     Last Office Visit: 2/20/25     Future Office visit:       Routing refill request to provider for review/approval because:

## 2025-03-24 RX ORDER — MONTELUKAST SODIUM 10 MG/1
TABLET ORAL
Qty: 90 TABLET | Refills: 1 | Status: SHIPPED | OUTPATIENT
Start: 2025-03-24

## 2025-03-24 NOTE — TELEPHONE ENCOUNTER
Routing refill request to provider for review/approval because:  Leukotriene Inhibitors Protocol Failed    Rerun Protocol (3/20/2025 9:57 AM)    Medication indicated for associated diagnosis    Medication is associated with one or more of the following diagnoses:              Allergies              Asthma              Atopic Dermatitis              Nasal Congestion              Nasal discharge              Rhinitis              Urticaria, chronic              Cystic Fibrosis  Bronchiectasis

## 2025-05-13 DIAGNOSIS — F34.1 DYSTHYMIC DISORDER: ICD-10-CM

## 2025-05-13 DIAGNOSIS — F41.1 GAD (GENERALIZED ANXIETY DISORDER): ICD-10-CM

## 2025-05-13 DIAGNOSIS — M1A.4720 CHRONIC GOUT DUE TO OTHER SECONDARY CAUSE INVOLVING TOE OF LEFT FOOT WITHOUT TOPHUS: ICD-10-CM

## 2025-05-13 RX ORDER — ALLOPURINOL 100 MG/1
200 TABLET ORAL DAILY
Qty: 180 TABLET | Refills: 0 | Status: SHIPPED | OUTPATIENT
Start: 2025-05-13

## 2025-05-13 RX ORDER — CITALOPRAM HYDROBROMIDE 20 MG/1
20 TABLET ORAL DAILY
Qty: 90 TABLET | Refills: 0 | Status: SHIPPED | OUTPATIENT
Start: 2025-05-13

## 2025-05-13 NOTE — TELEPHONE ENCOUNTER
used Celexa      Last Written Prescription Date:  2/12/25  Last Fill Quantity: 90,   # refills: 0  Last Office Visit: 2/20/25  Future Office visit:    Next 5 appointments (look out 90 days)      Jul 24, 2025 9:00 AM  (Arrive by 8:45 AM)  Adult Preventative Visit with Meche Dougherty MD  [facility] [place] ([facility] [place] ) [address]  [place] [address]  [phone]             Routing refill request to provider for review/approval because:      Allopurinol      Last Written Prescription Date:  10/18/24  Last Fill Quantity: 180,   # refills: 1  Last Office Visit: 2/20/25  Future Office visit:    Next 5 appointments (look out 90 days)      Jul 24, 2025 9:00 AM  (Arrive by 8:45 AM)  Adult Preventative Visit with Meche Dougherty MD  [facility] [place] ([facility] [place] ) [address]  [place] [address]  [phone]             Routing refill request to provider for review/approval because:

## 2025-05-13 NOTE — TELEPHONE ENCOUNTER
Routing refill request to provider for review/approval because:  Gout Agents Protocol Failed    Rerun Protocol (5/13/2025 11:13 AM)    Has GFR on file in past 12 months and most recent value is normal

## 2025-06-03 DIAGNOSIS — I10 ESSENTIAL HYPERTENSION, BENIGN: ICD-10-CM

## 2025-06-03 RX ORDER — LISINOPRIL 20 MG/1
20 TABLET ORAL DAILY
Qty: 90 TABLET | Refills: 0 | Status: SHIPPED | OUTPATIENT
Start: 2025-06-03

## 2025-06-03 NOTE — TELEPHONE ENCOUNTER
Disp Refills Start End CINTHYA   lisinopril (ZESTRIL) 20 MG tablet 90 tablet 1 12/2/2024 -- No     Last Office Visit: 02/20/2025  Future Office visit:    Next 5 appointments (look out 90 days)      Jul 24, 2025 9:00 AM  (Arrive by 8:45 AM)  Adult Preventative Visit with Meche Dougherty MD  [facility] [place] ([facility] [place] ) [address]  [place] [address]  [phone]             Routing refill request to provider for review/approval because:

## 2025-06-25 DIAGNOSIS — G47.09 OTHER INSOMNIA: ICD-10-CM

## 2025-06-25 NOTE — TELEPHONE ENCOUNTER
Trazodone       Last Written Prescription Date:  5/07/2024  Last Fill Quantity: 135,   # refills: 2  Last Office Visit: 2/20/2025  Future Office visit:

## 2025-06-26 RX ORDER — TRAZODONE HYDROCHLORIDE 50 MG/1
TABLET ORAL
Qty: 135 TABLET | Refills: 0 | Status: SHIPPED | OUTPATIENT
Start: 2025-06-26

## 2025-06-26 NOTE — TELEPHONE ENCOUNTER
Serotonin Modulators Twtpoc6606/25/2025 11:09 AM   Protocol Details Medication is active on med list and the sig matches. RN to manually verify dose and sig if red X/fail.

## 2025-07-08 ENCOUNTER — TELEPHONE (OUTPATIENT)
Dept: FAMILY MEDICINE | Facility: OTHER | Age: 75
End: 2025-07-08

## 2025-07-08 NOTE — TELEPHONE ENCOUNTER
4:21 PM    Reason for Call: OVERBOOK    Patient is having the following symptoms: Diarrhea for month they haven't improved patient stated he saw Violeta Dow for this.     The patient is requesting an appointment for same day appointment with Violeta Dow    Was an appointment offered for this call? No  If yes : Appointment type              Date    Preferred method for responding to this message: Telephone Call  What is your phone number ? [phone] can leave a message     If we cannot reach you directly, may we leave a detailed response at the number you provided? Yes    Can this message wait until your PCP/provider returns, if unavailable today? No

## 2025-07-09 NOTE — TELEPHONE ENCOUNTER
Attempt # 1  Outcome: Left Message   Comment: LVM for patient to call to schedule an appt with Violeta-see below for dates & times available.

## 2025-07-10 NOTE — PROGRESS NOTES
"  Assessment & Plan     Diarrhea, unspecified type  74-year-old male presented to clinic today to discuss chronic diarrhea.  Notes symptoms began in the last 2 months.  Denies any associated concerns including chest pain, shortness of breath, abdominal pain, nausea, vomiting, or recent fevers.  No melena.  Is up-to-date with his colonoscopy.  Has not tried any over-the-counter medication to control diarrhea.  Describes it as watery, brown and will occur 2-3 times a day.  Did note that he started taking 1 to 2 tablespoons of olive oil prior to the onset of the diarrhea.  Physical exam is reassuring with regular rate and rhythm, lungs clear to auscultation, and no abdominal pain with palpation.  Given that there has been no systemic illness without fever and is not otherwise symptomatic suspect that likely the source of the diarrhea is the daily consumption olive oil.  Will obtain a routine BMP to assess hydration.  Advised holding the olive oil to see if symptoms improve.  If symptoms do not improve within the next week, will obtain stool studies to look for viral infection.  Patient was given the opportunity to ask questions and all were answered with apparent satisfaction.  - Comprehensive metabolic panel (BMP + Alb, Alk Phos, ALT, AST, Total. Bili, TP); Future  - Comprehensive metabolic panel (BMP + Alb, Alk Phos, ALT, AST, Total. Bili, TP)    BMI  Estimated body mass index is 29.41 kg/m  as calculated from the following:    Height as of this encounter: 1.684 m (5' 6.3\").    Weight as of this encounter: 83.4 kg (183 lb 14.4 oz).           Marlyn Akers is a 74 year old, presenting for the following health issues:  Diarrhea    Has been having diarrhea for the past 2 months. Will have 2-3 episodes of diarrhea a day. Described as brown and watery. No chest pain, shortness of breath, abdominal pain, nausea, vomiting. No fevers. Has not been taking anything for symptom management. Appetite is normal. Has been " "taking 1-2 tb of extra virgin olive oil a night. Will have coffee in the morning and wine with the evening meal. Water intake is 2-3 glasses a day.       7/14/2025     1:53 PM   Additional Questions   Roomed by Candace lowry   Accompanied by Self         7/14/2025     1:53 PM   Patient Reported Additional Medications   Patient reports taking the following new medications None     HPI      Diarrhea  Onset/Duration: 2 months  Description:       Consistency of stool: loose and brown       Blood in stool: No       Number of loose stools past 24 hours: 2  Progression of Symptoms: same  Accompanying signs and symptoms:       Fever: No       Nausea/Vomiting: No       Abdominal pain: No       Weight loss: No       Episodes of constipation: No  History   Ill contacts: No  Recent use of antibiotics: No  Recent travels: YES went to [place], been back for 2 months  Recent medication-new or changes(Rx or OTC): No  Precipitating or alleviating factors: None  Therapies tried and outcome: None          Review of Systems  Constitutional, HEENT, cardiovascular, pulmonary, GI, , musculoskeletal, neuro, skin, endocrine and psych systems are negative, except as otherwise noted.      Objective    BP 94/50 (BP Location: Left arm, Patient Position: Sitting, Cuff Size: Adult Regular)   Pulse 52   Temp 97.1  F (36.2  C) (Tympanic)   Resp 16   Ht 1.684 m (5' 6.3\")   Wt 83.4 kg (183 lb 14.4 oz)   SpO2 96%   BMI 29.41 kg/m    Body mass index is 29.41 kg/m .  Physical Exam   GENERAL: alert and no distress  EYES: Eyes grossly normal to inspection  RESP: lungs clear to auscultation - no rales, rhonchi or wheezes  CV: regular rate and rhythm, normal S1 S2, no S3 or S4, no murmur, click or rub, no peripheral edema  ABDOMEN: soft, nontender, no hepatosplenomegaly, no masses and bowel sounds normal  MS: no gross musculoskeletal defects noted, no edema  PSYCH: mentation appears normal, affect normal/bright    Recent Results (from the past 24 " hours)   Comprehensive metabolic panel (BMP + Alb, Alk Phos, ALT, AST, Total. Bili, TP)   Result Value Ref Range    Sodium 136 135 - 145 mmol/L    Potassium 4.6 3.4 - 5.3 mmol/L    Carbon Dioxide (CO2) 21 (L) 22 - 29 mmol/L    Anion Gap 14 7 - 15 mmol/L    Urea Nitrogen 26.1 (H) 8.0 - 23.0 mg/dL    Creatinine 1.20 (H) 0.67 - 1.17 mg/dL    GFR Estimate 63 >60 mL/min/1.73m2    Calcium 9.7 8.8 - 10.4 mg/dL    Chloride 101 98 - 107 mmol/L    Glucose 94 70 - 99 mg/dL    Alkaline Phosphatase 95 40 - 150 U/L    AST 33 0 - 45 U/L    ALT 22 0 - 70 U/L    Protein Total 7.5 6.4 - 8.3 g/dL    Albumin 4.2 3.5 - 5.2 g/dL    Bilirubin Total 0.6 <=1.2 mg/dL         20 minutes spent by me on the date of the encounter doing chart review, history and exam, documentation and further activities per the note.  Signed Electronically by: Violeta Dow PA-C

## 2025-07-14 ENCOUNTER — OFFICE VISIT (OUTPATIENT)
Dept: FAMILY MEDICINE | Facility: OTHER | Age: 75
End: 2025-07-14
Attending: STUDENT IN AN ORGANIZED HEALTH CARE EDUCATION/TRAINING PROGRAM
Payer: COMMERCIAL

## 2025-07-14 VITALS
HEART RATE: 52 BPM | OXYGEN SATURATION: 96 % | BODY MASS INDEX: 29.56 KG/M2 | SYSTOLIC BLOOD PRESSURE: 94 MMHG | DIASTOLIC BLOOD PRESSURE: 50 MMHG | RESPIRATION RATE: 16 BRPM | TEMPERATURE: 97.1 F | WEIGHT: 183.9 LBS | HEIGHT: 66 IN

## 2025-07-14 DIAGNOSIS — R19.7 DIARRHEA, UNSPECIFIED TYPE: Primary | ICD-10-CM

## 2025-07-14 LAB
ALBUMIN SERPL BCG-MCNC: 4.2 G/DL (ref 3.5–5.2)
ALP SERPL-CCNC: 95 U/L (ref 40–150)
ALT SERPL W P-5'-P-CCNC: 22 U/L (ref 0–70)
ANION GAP SERPL CALCULATED.3IONS-SCNC: 14 MMOL/L (ref 7–15)
AST SERPL W P-5'-P-CCNC: 33 U/L (ref 0–45)
BILIRUB SERPL-MCNC: 0.6 MG/DL
BUN SERPL-MCNC: 26.1 MG/DL (ref 8–23)
CALCIUM SERPL-MCNC: 9.7 MG/DL (ref 8.8–10.4)
CHLORIDE SERPL-SCNC: 101 MMOL/L (ref 98–107)
CREAT SERPL-MCNC: 1.2 MG/DL (ref 0.67–1.17)
EGFRCR SERPLBLD CKD-EPI 2021: 63 ML/MIN/1.73M2
GLUCOSE SERPL-MCNC: 94 MG/DL (ref 70–99)
HCO3 SERPL-SCNC: 21 MMOL/L (ref 22–29)
POTASSIUM SERPL-SCNC: 4.6 MMOL/L (ref 3.4–5.3)
PROT SERPL-MCNC: 7.5 G/DL (ref 6.4–8.3)
SODIUM SERPL-SCNC: 136 MMOL/L (ref 135–145)

## 2025-07-14 PROCEDURE — 36415 COLL VENOUS BLD VENIPUNCTURE: CPT | Mod: ZL | Performed by: STUDENT IN AN ORGANIZED HEALTH CARE EDUCATION/TRAINING PROGRAM

## 2025-07-14 PROCEDURE — 80053 COMPREHEN METABOLIC PANEL: CPT | Mod: ZL | Performed by: STUDENT IN AN ORGANIZED HEALTH CARE EDUCATION/TRAINING PROGRAM

## 2025-07-14 PROCEDURE — G0463 HOSPITAL OUTPT CLINIC VISIT: HCPCS

## 2025-07-14 ASSESSMENT — PAIN SCALES - GENERAL: PAINLEVEL_OUTOF10: NO PAIN (0)

## 2025-07-24 ENCOUNTER — TELEPHONE (OUTPATIENT)
Dept: CARE COORDINATION | Facility: OTHER | Age: 75
End: 2025-07-24

## 2025-07-24 NOTE — TELEPHONE ENCOUNTER
7/24/2025 3:56 PM  Writer made two attempts to call patient back, no answer,  writer left  and provided call back number.     Chinyere Adams RN

## 2025-07-28 ENCOUNTER — TELEPHONE (OUTPATIENT)
Dept: FAMILY MEDICINE | Facility: OTHER | Age: 75
End: 2025-07-28

## 2025-07-30 NOTE — PROGRESS NOTES
"  Assessment & Plan     Diarrhea, unspecified type  74-year-old male presented clinic today with continued diarrhea.  Has had diarrhea for the past month with approximately 3 movements per day.  Describes the watery diarrhea as watery, brown in color, and blood.  Will have urgency prior to a bowel movement.  Denies any significant abdominal pain or gas.  No melena or hematochezia. No fevers.  Physical exam is reassuring with regular rate and rhythm, lungs clear to auscultation, abdomen that is soft, nontender, with normal bowel sounds.  No family history of celiac disease.  Last colonoscopy was normal.  Was previously taking a tablespoon of olive oil every day, but stopped approximately 2 weeks ago.  Has not tried any medication to slow the diarrhea.  Has not tried any dietary modifications. Previous electrolytes were normal on labs.  Discussed obtaining further labs to assess for underlying cause of diarrhea. Pending lab results will consider referral for repeat colonoscopy.  Patient was given an opportunity to ask questions and all were answered with apparent satisfaction.    - CBC with platelets and differential; Future  - TSH with free T4 reflex; Future  - CRP, inflammation; Future  - IgA [LAB73]  - Deamidated Giladin Peptide Vincent IgA IgG [[id number]]  - Tissue transglutaminase vincent IgA and IgG [MPB8350]  - Endomysial Antibody IgA by IFA [[id number]]  - Fecal colorectal cancer screen (FIT); Future  - Calprotectin Feces; Future  - Calprotectin Feces  - CBC with platelets and differential  - TSH with free T4 reflex  - CRP, inflammation    BMI  Estimated body mass index is 30.37 kg/m  as calculated from the following:    Height as of 7/14/25: 1.684 m (5' 6.3\").    Weight as of this encounter: 86.1 kg (189 lb 14.4 oz).           Marlyn Akers is a 74 year old, presenting for the following health issues:  Diarrhea        7/31/2025     3:45 PM   Additional Questions   Roomed by Valentino Arita   Accompanied by None "         7/31/2025     3:45 PM   Patient Reported Additional Medications   Patient reports taking the following new medications None     HPI      Continued diarrhea. Will have large movement in the morning. Will have 2-3 movements. Has stopped olive oil.  Has not noticed any triggers. No abdominal pain. No extra gas. No fevers. Has not been eating dairy. Has been eating a varied diet. No recent antibiotics. Does not have significant urgency.   Diarrhea   Onset/Duration: ongoing   Description:       Consistency of stool: runny, pasty, and brown        Blood in stool: No       Number of loose stools past 24 hours: 3 times   Progression of Symptoms: same  Accompanying signs and symptoms:       Fever: No       Nausea/Vomiting: No       Abdominal pain: No       Weight loss: No       Episodes of constipation: No  History   Ill contacts: No  Recent use of antibiotics: No  Recent travels: No  Recent medication-new or changes(Rx or OTC): No  Precipitating or alleviating factors: None  Therapies tried and outcome: Not taking extra virgin olive oil- Non effective          Review of Systems  Constitutional, HEENT, cardiovascular, pulmonary, GI, , musculoskeletal, neuro, skin, endocrine and psych systems are negative, except as otherwise noted.      Objective    /69 (BP Location: Right arm, Patient Position: Sitting, Cuff Size: Adult Regular)   Pulse 59   Temp 96.9  F (36.1  C) (Tympanic)   Resp 14   Wt 86.1 kg (189 lb 14.4 oz)   SpO2 97%   BMI 30.37 kg/m    Body mass index is 30.37 kg/m .  Physical Exam   GENERAL: alert and no distress  EYES: Eyes grossly normal to inspection, PERRL and conjunctivae and sclerae normal  RESP: lungs clear to auscultation - no rales, rhonchi or wheezes  CV: regular rate and rhythm, normal S1 S2, no S3 or S4, no murmur, click or rub, no peripheral edema  ABDOMEN: soft, nontender, no hepatosplenomegaly, no masses and bowel sounds normal  MS: no gross musculoskeletal defects noted, no  edema  PSYCH: mentation appears normal, affect normal/bright    Recent Results (from the past 24 hours)   CBC with platelets and differential    Narrative    The following orders were created for panel order CBC with platelets and differential.  Procedure                               Abnormality         Status                     ---------                               -----------         ------                     CBC with platelets and ...[[id number]]  Abnormal            Final result                 Please view results for these tests on the individual orders.   CRP, inflammation   Result Value Ref Range    CRP Inflammation 4.60 <5.00 mg/L   CBC with platelets and differential   Result Value Ref Range    WBC Count 7.9 4.0 - 11.0 10e3/uL    RBC Count 3.51 (L) 4.40 - 5.90 10e6/uL    Hemoglobin 11.7 (L) 13.3 - 17.7 g/dL    Hematocrit 34.4 (L) 40.0 - 53.0 %    MCV 98 78 - 100 fL    MCH 33.3 (H) 26.5 - 33.0 pg    MCHC 34.0 31.5 - 36.5 g/dL    RDW 14.2 10.0 - 15.0 %    Platelet Count 205 150 - 450 10e3/uL    % Neutrophils 75 %    % Lymphocytes 16 %    % Monocytes 7 %    % Eosinophils 2 %    % Basophils 0 %    % Immature Granulocytes 0 %    NRBCs per 100 WBC 0 <1 /100    Absolute Neutrophils 5.9 1.6 - 8.3 10e3/uL    Absolute Lymphocytes 1.3 0.8 - 5.3 10e3/uL    Absolute Monocytes 0.6 0.0 - 1.3 10e3/uL    Absolute Eosinophils 0.1 0.0 - 0.7 10e3/uL    Absolute Basophils 0.0 0.0 - 0.2 10e3/uL    Absolute Immature Granulocytes 0.0 <=0.4 10e3/uL    Absolute NRBCs 0.0 10e3/uL         27 minutes spent by me on the date of the encounter doing chart review, history and exam, documentation and further activities per the note.    Signed Electronically by: Violeta Dow PA-C    Answers submitted by the patient for this visit:  Patient Health Questionnaire (Submitted on 7/31/2025)  If you checked off any problems, how difficult have these problems made it for you to do your work, take care of things at home, or get along with  other people?: Not difficult at all  PHQ9 TOTAL SCORE: 1  Patient Health Questionnaire (G7) (Submitted on 7/31/2025)  SILVIA 7 TOTAL SCORE: 0

## 2025-07-31 ENCOUNTER — OFFICE VISIT (OUTPATIENT)
Dept: FAMILY MEDICINE | Facility: OTHER | Age: 75
End: 2025-07-31
Attending: STUDENT IN AN ORGANIZED HEALTH CARE EDUCATION/TRAINING PROGRAM
Payer: COMMERCIAL

## 2025-07-31 VITALS
RESPIRATION RATE: 14 BRPM | HEART RATE: 59 BPM | OXYGEN SATURATION: 97 % | WEIGHT: 189.9 LBS | DIASTOLIC BLOOD PRESSURE: 69 MMHG | SYSTOLIC BLOOD PRESSURE: 129 MMHG | TEMPERATURE: 96.9 F | BODY MASS INDEX: 30.37 KG/M2

## 2025-07-31 DIAGNOSIS — R19.7 DIARRHEA, UNSPECIFIED TYPE: Primary | ICD-10-CM

## 2025-07-31 LAB
BASOPHILS # BLD AUTO: 0 10E3/UL (ref 0–0.2)
BASOPHILS NFR BLD AUTO: 0 %
CRP SERPL-MCNC: 4.6 MG/L
EOSINOPHIL # BLD AUTO: 0.1 10E3/UL (ref 0–0.7)
EOSINOPHIL NFR BLD AUTO: 2 %
ERYTHROCYTE [DISTWIDTH] IN BLOOD BY AUTOMATED COUNT: 14.2 % (ref 10–15)
HCT VFR BLD AUTO: 34.4 % (ref 40–53)
HGB BLD-MCNC: 11.7 G/DL (ref 13.3–17.7)
IMM GRANULOCYTES # BLD: 0 10E3/UL
IMM GRANULOCYTES NFR BLD: 0 %
LYMPHOCYTES # BLD AUTO: 1.3 10E3/UL (ref 0.8–5.3)
LYMPHOCYTES NFR BLD AUTO: 16 %
MCH RBC QN AUTO: 33.3 PG (ref 26.5–33)
MCHC RBC AUTO-ENTMCNC: 34 G/DL (ref 31.5–36.5)
MCV RBC AUTO: 98 FL (ref 78–100)
MONOCYTES # BLD AUTO: 0.6 10E3/UL (ref 0–1.3)
MONOCYTES NFR BLD AUTO: 7 %
NEUTROPHILS # BLD AUTO: 5.9 10E3/UL (ref 1.6–8.3)
NEUTROPHILS NFR BLD AUTO: 75 %
NRBC # BLD AUTO: 0 10E3/UL
NRBC BLD AUTO-RTO: 0 /100
PLATELET # BLD AUTO: 205 10E3/UL (ref 150–450)
RBC # BLD AUTO: 3.51 10E6/UL (ref 4.4–5.9)
TSH SERPL DL<=0.005 MIU/L-ACNC: 2.02 UIU/ML (ref 0.3–4.2)
WBC # BLD AUTO: 7.9 10E3/UL (ref 4–11)

## 2025-07-31 PROCEDURE — 85041 AUTOMATED RBC COUNT: CPT | Mod: ZL | Performed by: STUDENT IN AN ORGANIZED HEALTH CARE EDUCATION/TRAINING PROGRAM

## 2025-07-31 PROCEDURE — 86231 EMA EACH IG CLASS: CPT | Mod: ZL | Performed by: STUDENT IN AN ORGANIZED HEALTH CARE EDUCATION/TRAINING PROGRAM

## 2025-07-31 PROCEDURE — 84443 ASSAY THYROID STIM HORMONE: CPT | Mod: ZL | Performed by: STUDENT IN AN ORGANIZED HEALTH CARE EDUCATION/TRAINING PROGRAM

## 2025-07-31 PROCEDURE — 86140 C-REACTIVE PROTEIN: CPT | Mod: ZL | Performed by: STUDENT IN AN ORGANIZED HEALTH CARE EDUCATION/TRAINING PROGRAM

## 2025-07-31 PROCEDURE — G0463 HOSPITAL OUTPT CLINIC VISIT: HCPCS

## 2025-07-31 PROCEDURE — 36415 COLL VENOUS BLD VENIPUNCTURE: CPT | Mod: ZL | Performed by: STUDENT IN AN ORGANIZED HEALTH CARE EDUCATION/TRAINING PROGRAM

## 2025-07-31 ASSESSMENT — ANXIETY QUESTIONNAIRES
2. NOT BEING ABLE TO STOP OR CONTROL WORRYING: NOT AT ALL
3. WORRYING TOO MUCH ABOUT DIFFERENT THINGS: NOT AT ALL
GAD7 TOTAL SCORE: 0
GAD7 TOTAL SCORE: 0
7. FEELING AFRAID AS IF SOMETHING AWFUL MIGHT HAPPEN: NOT AT ALL
GAD7 TOTAL SCORE: 0
6. BECOMING EASILY ANNOYED OR IRRITABLE: NOT AT ALL
5. BEING SO RESTLESS THAT IT IS HARD TO SIT STILL: NOT AT ALL
8. IF YOU CHECKED OFF ANY PROBLEMS, HOW DIFFICULT HAVE THESE MADE IT FOR YOU TO DO YOUR WORK, TAKE CARE OF THINGS AT HOME, OR GET ALONG WITH OTHER PEOPLE?: NOT DIFFICULT AT ALL
7. FEELING AFRAID AS IF SOMETHING AWFUL MIGHT HAPPEN: NOT AT ALL
IF YOU CHECKED OFF ANY PROBLEMS ON THIS QUESTIONNAIRE, HOW DIFFICULT HAVE THESE PROBLEMS MADE IT FOR YOU TO DO YOUR WORK, TAKE CARE OF THINGS AT HOME, OR GET ALONG WITH OTHER PEOPLE: NOT DIFFICULT AT ALL
1. FEELING NERVOUS, ANXIOUS, OR ON EDGE: NOT AT ALL
4. TROUBLE RELAXING: NOT AT ALL

## 2025-07-31 ASSESSMENT — PAIN SCALES - GENERAL: PAINLEVEL_OUTOF10: NO PAIN (0)

## 2025-08-01 ENCOUNTER — APPOINTMENT (OUTPATIENT)
Dept: LAB | Facility: OTHER | Age: 75
End: 2025-08-01
Payer: COMMERCIAL

## 2025-08-01 PROCEDURE — 83993 ASSAY FOR CALPROTECTIN FECAL: CPT | Mod: ZL | Performed by: STUDENT IN AN ORGANIZED HEALTH CARE EDUCATION/TRAINING PROGRAM

## 2025-08-03 LAB — ENDOMYSIUM IGA TITR SER IF: NORMAL {TITER}

## 2025-08-04 LAB
CALPROTECTIN STL-MCNT: <5 MG/KG (ref 0–49.9)
GLIADIN IGA SER-ACNC: 4.1 U/ML
GLIADIN IGG SER-ACNC: <0.6 U/ML
IGA SERPL-MCNC: 319 MG/DL (ref 84–499)
TTG IGA SER-ACNC: 1 U/ML
TTG IGG SER-ACNC: <0.6 U/ML

## 2025-08-11 DIAGNOSIS — M1A.4720 CHRONIC GOUT DUE TO OTHER SECONDARY CAUSE INVOLVING TOE OF LEFT FOOT WITHOUT TOPHUS: ICD-10-CM

## 2025-08-11 DIAGNOSIS — F41.1 GAD (GENERALIZED ANXIETY DISORDER): ICD-10-CM

## 2025-08-11 DIAGNOSIS — F34.1 DYSTHYMIC DISORDER: ICD-10-CM

## 2025-08-11 RX ORDER — CITALOPRAM HYDROBROMIDE 20 MG/1
20 TABLET ORAL DAILY
Qty: 90 TABLET | Refills: 0 | Status: SHIPPED | OUTPATIENT
Start: 2025-08-11

## 2025-08-12 RX ORDER — ALLOPURINOL 100 MG/1
200 TABLET ORAL DAILY
Qty: 180 TABLET | Refills: 1 | Status: SHIPPED | OUTPATIENT
Start: 2025-08-12

## 2025-09-03 DIAGNOSIS — I10 ESSENTIAL HYPERTENSION, BENIGN: ICD-10-CM

## 2025-09-03 RX ORDER — LISINOPRIL 20 MG/1
20 TABLET ORAL DAILY
Qty: 90 TABLET | Refills: 1 | Status: SHIPPED | OUTPATIENT
Start: 2025-09-03

## (undated) DEVICE — TUBING-SUCTION 20FT

## (undated) DEVICE — CONNECTOR ERBEFLO 2 PORT 20325-215

## (undated) DEVICE — IRRIGATION-H2O 1000ML

## (undated) DEVICE — TUBING SUCTION 20FT N620A

## (undated) DEVICE — CONNECTOR-ERBEFLO 2 PORT

## (undated) DEVICE — FORCEPS BIOPSY RADIAL JAW 4 LARGE W/NEEDLE 240CM M00513332

## (undated) DEVICE — SUCTION MANIFOLD NEPTUNE 2 SYS 1 PORT 702-025-000

## (undated) DEVICE — SOL WATER IRRIG 1000ML BOTTLE 2F7114

## (undated) DEVICE — CANISTER SUCTION MEDI-VAC GUARDIAN 2000ML 90D 65651-220

## (undated) RX ORDER — PROPOFOL 10 MG/ML
INJECTION, EMULSION INTRAVENOUS
Status: DISPENSED
Start: 2021-07-29

## (undated) RX ORDER — EPHEDRINE SULFATE 50 MG/ML
INJECTION, SOLUTION INTRAMUSCULAR; INTRAVENOUS; SUBCUTANEOUS
Status: DISPENSED
Start: 2024-05-22

## (undated) RX ORDER — PROPOFOL 10 MG/ML
INJECTION, EMULSION INTRAVENOUS
Status: DISPENSED
Start: 2024-05-22

## (undated) RX ORDER — LIDOCAINE HYDROCHLORIDE 20 MG/ML
INJECTION, SOLUTION EPIDURAL; INFILTRATION; INTRACAUDAL; PERINEURAL
Status: DISPENSED
Start: 2021-07-29